# Patient Record
Sex: FEMALE | Race: ASIAN | NOT HISPANIC OR LATINO | Employment: OTHER | URBAN - METROPOLITAN AREA
[De-identification: names, ages, dates, MRNs, and addresses within clinical notes are randomized per-mention and may not be internally consistent; named-entity substitution may affect disease eponyms.]

---

## 2022-11-04 ENCOUNTER — APPOINTMENT (OUTPATIENT)
Dept: MRI IMAGING | Facility: CLINIC | Age: 82
End: 2022-11-04
Attending: EMERGENCY MEDICINE
Payer: COMMERCIAL

## 2022-11-04 ENCOUNTER — HOSPITAL ENCOUNTER (EMERGENCY)
Facility: CLINIC | Age: 82
Discharge: HOME | End: 2022-11-04
Attending: EMERGENCY MEDICINE | Admitting: EMERGENCY MEDICINE
Payer: COMMERCIAL

## 2022-11-04 VITALS
DIASTOLIC BLOOD PRESSURE: 84 MMHG | RESPIRATION RATE: 12 BRPM | OXYGEN SATURATION: 92 % | HEART RATE: 101 BPM | TEMPERATURE: 98 F | SYSTOLIC BLOOD PRESSURE: 189 MMHG

## 2022-11-04 DIAGNOSIS — R42 DIZZINESS: ICD-10-CM

## 2022-11-04 DIAGNOSIS — E11.319 DIABETIC RETINOPATHY OF BOTH EYES ASSOCIATED WITH TYPE 2 DIABETES MELLITUS, MACULAR EDEMA PRESENCE UNSPECIFIED, UNSPECIFIED RETINOPATHY SEVERITY (H): ICD-10-CM

## 2022-11-04 DIAGNOSIS — R79.89 ELEVATED TROPONIN: ICD-10-CM

## 2022-11-04 LAB
ANION GAP SERPL CALCULATED.3IONS-SCNC: 8 MMOL/L (ref 3–14)
ATRIAL RATE - MUSE: 77 BPM
BASOPHILS # BLD AUTO: 0.1 10E3/UL (ref 0–0.2)
BASOPHILS NFR BLD AUTO: 1 %
BUN SERPL-MCNC: 32 MG/DL (ref 7–30)
CALCIUM SERPL-MCNC: 9.2 MG/DL (ref 8.5–10.1)
CHLORIDE BLD-SCNC: 102 MMOL/L (ref 94–109)
CO2 SERPL-SCNC: 25 MMOL/L (ref 20–32)
CREAT SERPL-MCNC: 1.61 MG/DL (ref 0.52–1.04)
DIASTOLIC BLOOD PRESSURE - MUSE: NORMAL MMHG
EOSINOPHIL # BLD AUTO: 0.2 10E3/UL (ref 0–0.7)
EOSINOPHIL NFR BLD AUTO: 2 %
ERYTHROCYTE [DISTWIDTH] IN BLOOD BY AUTOMATED COUNT: 14.9 % (ref 10–15)
GFR SERPL CREATININE-BSD FRML MDRD: 32 ML/MIN/1.73M2
GLUCOSE BLD-MCNC: 274 MG/DL (ref 70–99)
HCT VFR BLD AUTO: 30.4 % (ref 35–47)
HGB BLD-MCNC: 9.4 G/DL (ref 11.7–15.7)
IMM GRANULOCYTES # BLD: 0 10E3/UL
IMM GRANULOCYTES NFR BLD: 0 %
INTERPRETATION ECG - MUSE: NORMAL
LYMPHOCYTES # BLD AUTO: 1.6 10E3/UL (ref 0.8–5.3)
LYMPHOCYTES NFR BLD AUTO: 20 %
MCH RBC QN AUTO: 20 PG (ref 26.5–33)
MCHC RBC AUTO-ENTMCNC: 30.9 G/DL (ref 31.5–36.5)
MCV RBC AUTO: 65 FL (ref 78–100)
MONOCYTES # BLD AUTO: 0.6 10E3/UL (ref 0–1.3)
MONOCYTES NFR BLD AUTO: 7 %
NEUTROPHILS # BLD AUTO: 5.5 10E3/UL (ref 1.6–8.3)
NEUTROPHILS NFR BLD AUTO: 70 %
NRBC # BLD AUTO: 0 10E3/UL
NRBC BLD AUTO-RTO: 0 /100
P AXIS - MUSE: 70 DEGREES
PLATELET # BLD AUTO: 209 10E3/UL (ref 150–450)
POTASSIUM BLD-SCNC: 3.8 MMOL/L (ref 3.4–5.3)
PR INTERVAL - MUSE: 144 MS
QRS DURATION - MUSE: 80 MS
QT - MUSE: 352 MS
QTC - MUSE: 398 MS
R AXIS - MUSE: -61 DEGREES
RBC # BLD AUTO: 4.69 10E6/UL (ref 3.8–5.2)
SODIUM SERPL-SCNC: 135 MMOL/L (ref 133–144)
SYSTOLIC BLOOD PRESSURE - MUSE: NORMAL MMHG
T AXIS - MUSE: 34 DEGREES
TROPONIN I SERPL HS-MCNC: 124 NG/L
TROPONIN I SERPL HS-MCNC: 126 NG/L
VENTRICULAR RATE- MUSE: 77 BPM
WBC # BLD AUTO: 7.9 10E3/UL (ref 4–11)

## 2022-11-04 PROCEDURE — 99285 EMERGENCY DEPT VISIT HI MDM: CPT | Mod: 25

## 2022-11-04 PROCEDURE — 36415 COLL VENOUS BLD VENIPUNCTURE: CPT | Performed by: EMERGENCY MEDICINE

## 2022-11-04 PROCEDURE — 70549 MR ANGIOGRAPH NECK W/O&W/DYE: CPT

## 2022-11-04 PROCEDURE — A9585 GADOBUTROL INJECTION: HCPCS | Performed by: EMERGENCY MEDICINE

## 2022-11-04 PROCEDURE — 96360 HYDRATION IV INFUSION INIT: CPT

## 2022-11-04 PROCEDURE — 84484 ASSAY OF TROPONIN QUANT: CPT | Performed by: EMERGENCY MEDICINE

## 2022-11-04 PROCEDURE — 36415 COLL VENOUS BLD VENIPUNCTURE: CPT | Performed by: INTERNAL MEDICINE

## 2022-11-04 PROCEDURE — 84484 ASSAY OF TROPONIN QUANT: CPT | Performed by: INTERNAL MEDICINE

## 2022-11-04 PROCEDURE — 80048 BASIC METABOLIC PNL TOTAL CA: CPT | Performed by: EMERGENCY MEDICINE

## 2022-11-04 PROCEDURE — 70553 MRI BRAIN STEM W/O & W/DYE: CPT

## 2022-11-04 PROCEDURE — 70544 MR ANGIOGRAPHY HEAD W/O DYE: CPT

## 2022-11-04 PROCEDURE — 96361 HYDRATE IV INFUSION ADD-ON: CPT

## 2022-11-04 PROCEDURE — 93005 ELECTROCARDIOGRAM TRACING: CPT

## 2022-11-04 PROCEDURE — 85014 HEMATOCRIT: CPT | Performed by: EMERGENCY MEDICINE

## 2022-11-04 PROCEDURE — 255N000002 HC RX 255 OP 636: Performed by: EMERGENCY MEDICINE

## 2022-11-04 PROCEDURE — 258N000003 HC RX IP 258 OP 636: Performed by: EMERGENCY MEDICINE

## 2022-11-04 RX ORDER — GADOBUTROL 604.72 MG/ML
10 INJECTION INTRAVENOUS ONCE
Status: COMPLETED | OUTPATIENT
Start: 2022-11-04 | End: 2022-11-04

## 2022-11-04 RX ORDER — ASPIRIN 81 MG/1
324 TABLET, CHEWABLE ORAL ONCE
Status: DISCONTINUED | OUTPATIENT
Start: 2022-11-04 | End: 2022-11-04 | Stop reason: HOSPADM

## 2022-11-04 RX ADMIN — GADOBUTROL 10 ML: 604.72 INJECTION INTRAVENOUS at 16:53

## 2022-11-04 RX ADMIN — SODIUM CHLORIDE 1000 ML: 9 INJECTION, SOLUTION INTRAVENOUS at 15:07

## 2022-11-04 RX ADMIN — SODIUM CHLORIDE 1000 ML: 9 INJECTION, SOLUTION INTRAVENOUS at 17:31

## 2022-11-04 ASSESSMENT — ENCOUNTER SYMPTOMS
FEVER: 0
DIZZINESS: 1
CHILLS: 0

## 2022-11-04 ASSESSMENT — ACTIVITIES OF DAILY LIVING (ADL)
ADLS_ACUITY_SCORE: 35

## 2022-11-04 NOTE — ED PROVIDER NOTES
"  History   Chief Complaint:  Dizziness    The history is provided by the patient. A  was used (Syriac).      June MYLES Do is a 81 year old female with history of diabetes who presents with dizziness and blurry vision via EMS. The patient states that she was lying in bed at home when she experienced sudden onset of her symptoms that lasted around 30 minutes wherein she felt dizzy in an \"uncomfortable way\" and experienced bilateral vision loss as well as feelings of passing out, though no syncope occurred. She screamed to call out for help, tried and failed to get in contact with her kids, and then left her house and pressed her med alert button and sat outside until EMS arrived. The patient lives independently, and EMS states that they could not obtain a thorough history due to a language barrier. En route her blood sugars were in the high 300s, and O2 saturations have been in the high 90s. She states that she currently feels better. She had some chest pain earlier but denies it currently alongside chest heaviness, fevers, chills, or recent illnesses. She has not had this problem before. She denies any history of stroke and is not anticoagulated.    Review of Systems   Constitutional: Negative for chills and fever.   Eyes: Positive for visual disturbance (bilateral vision loss).   Cardiovascular: Positive for chest pain.        (-) chest heaviness   Neurological: Positive for dizziness. Negative for syncope.   All other systems reviewed and are negative.    Allergies:  The patient has no known allergies to medications.    Medications:  The patient has no known medications.     Past Medical History:     Diabetes    Past Surgical History:    The patient denies any prior surgical history.    Family History:    The patient denies any prior family history.    Social History:  The patient presents to the ED via EMS.  PCP: No primary care provider on file.     Physical Exam     Patient Vitals for the " past 24 hrs:   BP Temp Temp src Pulse Resp SpO2   11/04/22 1800 (!) 189/84 -- -- 101 -- 92 %   11/04/22 1550 (!) 148/66 -- -- 76 12 95 %   11/04/22 1540 -- -- -- 77 13 93 %   11/04/22 1530 (!) 156/73 -- -- 75 16 94 %   11/04/22 1520 (!) 149/70 -- -- 73 14 95 %   11/04/22 1500 (!) 146/68 -- -- 80 19 95 %   11/04/22 1445 (!) 159/70 -- -- 83 15 94 %   11/04/22 1435 (!) 174/76 -- -- 86 -- --   11/04/22 1415 (!) 163/76 -- -- 73 18 100 %   11/04/22 1408 (!) 166/75 -- -- 76 -- --   11/04/22 1331 -- -- -- 73 11 100 %   11/04/22 1315 -- -- -- 75 11 99 %   11/04/22 1304 (!) 185/80 98  F (36.7  C) Temporal 80 12 98 %       Physical Exam  GENERAL: well developed, pleasant  HEAD: atraumatic  EYES: pupils reactive, extraocular muscles intact, conjunctivae normal  ENT:  mucus membranes moist  NECK:  trachea midline, normal range of motion  RESPIRATORY: no tachypnea, breath sounds clear to auscultation   CVS: normal S1/S2, no murmurs, intact distal pulses  ABDOMEN: soft, nontender, nondistention  MUSCULOSKELETAL: no deformities  SKIN: warm and dry, no acute rashes or ulceration  NEURO: GCS 15, cranial nerves intact, alert and oriented x3. Normal finger-to-nose.   PSYCH:  Mood/affect normal    Emergency Department Course   ECG  ECG results from 11/04/22   EKG 12 lead     Value    Systolic Blood Pressure     Diastolic Blood Pressure     Ventricular Rate 77    Atrial Rate 77    SC Interval 144    QRS Duration 80        QTc 398    P Axis 70    R AXIS -61    T Axis 34    Interpretation ECG      Sinus rhythm  Left axis deviation  Nonspecific ST and T wave abnormality  Abnormal ECG  No previous ECGs available  Confirmed by GENERATED REPORT, COMPUTER ([id number]),  Carla Arroyo ([id number]) on 11/4/2022 4:04:58 PM         Imaging:  MR Head w/o Contrast Angiogram   Final Result   IMPRESSION:   HEAD MRI:    1.  No acute intracranial process.   2.  Generalized brain atrophy and presumed microvascular ischemic changes as detailed above.    3.  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      HEAD MRA:    1.  Moderate stenosis of the distal M1 right middle cerebral artery.   2.  No additional high-grade stenosis or occlusion. No aneurysm or vascular malformation.      NECK MRA:   1.  Normal neck MRA.      MRA Angiogram Neck w/o & w Contrast   Final Result   IMPRESSION:   HEAD MRI:    1.  No acute intracranial process.   2.  Generalized brain atrophy and presumed microvascular ischemic changes as detailed above.   3.  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      HEAD MRA:    1.  Moderate stenosis of the distal M1 right middle cerebral artery.   2.  No additional high-grade stenosis or occlusion. No aneurysm or vascular malformation.      NECK MRA:   1.  Normal neck MRA.      MR Brain w/o & w Contrast   Final Result   IMPRESSION:   HEAD MRI:    1.  No acute intracranial process.   2.  Generalized brain atrophy and presumed microvascular ischemic changes as detailed above.   3.  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      HEAD MRA:    1.  Moderate stenosis of the distal M1 right middle cerebral artery.   2.  No additional high-grade stenosis or occlusion. No aneurysm or vascular malformation.      NECK MRA:   1.  Normal neck MRA.      NM Lexiscan stress test (nuc card)    (Results Pending)     Report per radiology    Laboratory:  Labs Ordered and Resulted from Time of ED Arrival to Time of ED Departure   BASIC METABOLIC PANEL - Abnormal       Result Value    Sodium 135      Potassium 3.8      Chloride 102      Carbon Dioxide (CO2) 25      Anion Gap 8      Urea Nitrogen 32 (*)     Creatinine 1.61 (*)     Calcium 9.2      Glucose 274 (*)     GFR Estimate 32 (*)    TROPONIN I - Abnormal    Troponin I High Sensitivity 124 (*)    CBC WITH PLATELETS AND DIFFERENTIAL - Abnormal    WBC Count 7.9      RBC Count 4.69      Hemoglobin 9.4 (*)     Hematocrit 30.4 (*)     MCV 65 (*)     MCH  20.0 (*)     MCHC 30.9 (*)     RDW 14.9      Platelet Count 209      % Neutrophils 70      % Lymphocytes 20      % Monocytes 7      % Eosinophils 2      % Basophils 1      % Immature Granulocytes 0      NRBCs per 100 WBC 0      Absolute Neutrophils 5.5      Absolute Lymphocytes 1.6      Absolute Monocytes 0.6      Absolute Eosinophils 0.2      Absolute Basophils 0.1      Absolute Immature Granulocytes 0.0      Absolute NRBCs 0.0     TROPONIN I - Abnormal    Troponin I High Sensitivity 126 (*)           Emergency Department Course:     Reviewed:  I reviewed nursing notes, vitals, past medical history and Care Everywhere    Assessments:  1333 I obtained history and examined the patient as noted above.    I rechecked the patient and explained findings.       Consults:  1358    I consulted with stroke neurology, regarding the patient's history and presentation here in the emergency department.    Interventions:  1507 NS 1L IV    Disposition:  Care of the patient was transferred to my colleague Dr. Hurley pending repeat troponin.     Impression & Plan     Medical Decision Making:  Patient presents with visual complaints, loss of vision, dizziness.  History is difficult even with phone interpretor.  MRI, MRA is negative.  Troponin is slightly elevated although patient doesn't recall chest pain or pressure and didn't recall it at beginning of case either, but there was possible reports to EMS of chest pain, although there wasn't an interpretor at the time.  She does not diabetic retinopathy and hyperglycemia.  Her vision is normal now.  I spoke with stroke neurology and bilateral blindness seems highly unlikely.  Discussed admission with hospitalist but notes chronic renal insuffiencey, and given lack of chest pain complaints, will repeat troponin and if unchanged discharge home if stable with gait.     Diagnosis:    ICD-10-CM    1. Dizziness  R42 NM Lexiscan stress test (nuc card)     Follow-Up with Cardiology MICHELE      2.  Elevated troponin  R77.8 NM Lexiscan stress test (nuc card)     Follow-Up with Cardiology MICHELE      3. Diabetic retinopathy of both eyes associated with type 2 diabetes mellitus, macular edema presence unspecified, unspecified retinopathy severity (H)  E11.319           Discharge Medications:  There are no discharge medications for this patient.      Scribe Disclosure:  Arabella SOUSA Hired, am serving as a scribe at 1:19 PM on 11/4/2022 to document services personally performed by Kian Smallwood MD based on my observations and the provider's statements to me.        Kian Smallwood MD  11/04/22 2120

## 2022-11-04 NOTE — CONSULTS
"      [facility]    Stroke Telephone Note    I was called by Kian Smallwood on 11/04/22 regarding patient June MYLES Do. The patient is a 81 year old female w/ PMHX of DM who presented with dizziness and bilateral vision loss. The dizzy was uncomfortable. No vision loss, no   PMHx of macular degeneration    Duration 30min.    Imaging Findings   MRI/MRA pending    Impression  Transient bilateral vision loss- had an episod eof bilateral vision loss lasting 30 min, associated with dizziness. No other deficits, back to baseline. Given bilateral complete vision loss this is not due to a TIA as a singular lesion would not produce bilateral complete vision loss. Would recommend further cardiac evaluation.     Recommendations     MRI hilary WWO/ MRA head and neck  If no stroke on MRI/MRA then no further stroke work-up needed    My recommendations are based on the information provided over the phone by June MYLES Do's in-person providers. They are not intended to replace the clinical judgment of her in-person providers. I was not requested to personally see or examine the patient at this time.    The Stroke Staff is Dr. Lozano.    Estrella Hi MD  Vascular Neurology Fellow  To page me or covering stroke neurology team member, click here: [facility]   Choose \"On Call\" tab at top, then search dropdown box for \"Neurology Adult\", select location, press Enter, then look for stroke/neuro ICU/telestroke.        "

## 2022-11-04 NOTE — ED PROVIDER NOTES
ED course:  Patient received as a handoff from my partner Dr. Smallwood.  On face-to-face evaluation patient reports no additional complaints.  She ambulated to the bathroom without assistance and denies current dizziness.  Repeat troponin remains flat.  Patient without chest pain or other concerning symptoms.  She will follow-up closely with ophthalmologist as scheduled this next week.  Return precautions discussed.  Discharged home with family.  Please see Dr. Brown's notes for additional details    Impression:    ICD-10-CM    1. Dizziness  R42       2. Elevated troponin  R77.8       3. Diabetic retinopathy of both eyes associated with type 2 diabetes mellitus, macular edema presence unspecified, unspecified retinopathy severity (H)  E11.319             Disposition:  Discharge         Ambrose Mcleodsundardiogo Richard,   11/04/22 5655

## 2022-11-04 NOTE — ED TRIAGE NOTES
Pt lives indep. - pt arrives via EMS after pt pushed medical alert button - unsure of chief complaint because of language barrier - pt reports feeling dizzy vision diff. -  - slight chest pain denies being SOB

## 2022-11-15 ENCOUNTER — DOCUMENTATION ONLY (OUTPATIENT)
Dept: OTHER | Facility: CLINIC | Age: 82
End: 2022-11-15

## 2023-02-24 ENCOUNTER — APPOINTMENT (OUTPATIENT)
Dept: GENERAL RADIOLOGY | Facility: CLINIC | Age: 83
End: 2023-02-24
Attending: EMERGENCY MEDICINE
Payer: COMMERCIAL

## 2023-02-24 ENCOUNTER — HOSPITAL ENCOUNTER (OUTPATIENT)
Facility: CLINIC | Age: 83
Setting detail: OBSERVATION
Discharge: HOME | End: 2023-02-26
Attending: EMERGENCY MEDICINE | Admitting: STUDENT IN AN ORGANIZED HEALTH CARE EDUCATION/TRAINING PROGRAM
Payer: COMMERCIAL

## 2023-02-24 DIAGNOSIS — E10.65 TYPE 1 DIABETES MELLITUS WITH HYPERGLYCEMIA (H): ICD-10-CM

## 2023-02-24 DIAGNOSIS — D64.9 ANEMIA, UNSPECIFIED TYPE: ICD-10-CM

## 2023-02-24 DIAGNOSIS — N18.32 STAGE 3B CHRONIC KIDNEY DISEASE (H): ICD-10-CM

## 2023-02-24 DIAGNOSIS — E87.1 HYPONATREMIA: Primary | ICD-10-CM

## 2023-02-24 LAB
ALBUMIN UR-MCNC: NEGATIVE MG/DL
ANION GAP SERPL CALCULATED.3IONS-SCNC: 13 MMOL/L (ref 7–15)
APPEARANCE UR: CLEAR
ATRIAL RATE - MUSE: 67 BPM
B-OH-BUTYR SERPL-MCNC: <0.2 MMOL/L
BASO STIPL BLD QL SMEAR: PRESENT
BASOPHILS # BLD AUTO: 0.1 10E3/UL (ref 0–0.2)
BASOPHILS NFR BLD AUTO: 1 %
BILIRUB UR QL STRIP: NEGATIVE
BUN SERPL-MCNC: 38.8 MG/DL (ref 8–23)
CALCIUM SERPL-MCNC: 9.3 MG/DL (ref 8.8–10.2)
CHLORIDE SERPL-SCNC: 90 MMOL/L (ref 98–107)
COLOR UR AUTO: ABNORMAL
CREAT SERPL-MCNC: 1.67 MG/DL (ref 0.51–0.95)
D DIMER PPP FEU-MCNC: 0.81 UG/ML FEU (ref 0–0.5)
DEPRECATED HCO3 PLAS-SCNC: 23 MMOL/L (ref 22–29)
DIASTOLIC BLOOD PRESSURE - MUSE: NORMAL MMHG
ELLIPTOCYTES BLD QL SMEAR: SLIGHT
EOSINOPHIL # BLD AUTO: 0.1 10E3/UL (ref 0–0.7)
EOSINOPHIL NFR BLD AUTO: 2 %
ERYTHROCYTE [DISTWIDTH] IN BLOOD BY AUTOMATED COUNT: 15.5 % (ref 10–15)
FLUAV RNA SPEC QL NAA+PROBE: NEGATIVE
FLUBV RNA RESP QL NAA+PROBE: NEGATIVE
GFR SERPL CREATININE-BSD FRML MDRD: 30 ML/MIN/1.73M2
GLUCOSE BLDC GLUCOMTR-MCNC: 209 MG/DL (ref 70–99)
GLUCOSE BLDC GLUCOMTR-MCNC: 273 MG/DL (ref 70–99)
GLUCOSE BLDC GLUCOMTR-MCNC: 311 MG/DL (ref 70–99)
GLUCOSE BLDC GLUCOMTR-MCNC: 413 MG/DL (ref 70–99)
GLUCOSE BLDC GLUCOMTR-MCNC: 441 MG/DL (ref 70–99)
GLUCOSE SERPL-MCNC: 592 MG/DL (ref 70–99)
GLUCOSE UR STRIP-MCNC: >=1000 MG/DL
HCO3 BLDV-SCNC: 22 MMOL/L (ref 21–28)
HCO3 BLDV-SCNC: 23 MMOL/L (ref 21–28)
HCT VFR BLD AUTO: 29.4 % (ref 35–47)
HGB BLD-MCNC: 9.1 G/DL (ref 11.7–15.7)
HGB UR QL STRIP: NEGATIVE
IMM GRANULOCYTES # BLD: 0 10E3/UL
IMM GRANULOCYTES NFR BLD: 0 %
INTERPRETATION ECG - MUSE: NORMAL
KETONES UR STRIP-MCNC: NEGATIVE MG/DL
LACTATE BLD-SCNC: 1.8 MMOL/L
LACTATE BLD-SCNC: 2.2 MMOL/L
LACTATE SERPL-SCNC: 1.9 MMOL/L (ref 0.7–2)
LEUKOCYTE ESTERASE UR QL STRIP: NEGATIVE
LYMPHOCYTES # BLD AUTO: 1.4 10E3/UL (ref 0.8–5.3)
LYMPHOCYTES NFR BLD AUTO: 24 %
MCH RBC QN AUTO: 19.6 PG (ref 26.5–33)
MCHC RBC AUTO-ENTMCNC: 31 G/DL (ref 31.5–36.5)
MCV RBC AUTO: 63 FL (ref 78–100)
MONOCYTES # BLD AUTO: 0.5 10E3/UL (ref 0–1.3)
MONOCYTES NFR BLD AUTO: 8 %
NEUTROPHILS # BLD AUTO: 3.6 10E3/UL (ref 1.6–8.3)
NEUTROPHILS NFR BLD AUTO: 65 %
NITRATE UR QL: NEGATIVE
NRBC # BLD AUTO: 0 10E3/UL
NRBC BLD AUTO-RTO: 0 /100
P AXIS - MUSE: 68 DEGREES
PCO2 BLDV: 38 MM HG (ref 40–50)
PCO2 BLDV: 40 MM HG (ref 40–50)
PH BLDV: 7.36 [PH] (ref 7.32–7.43)
PH BLDV: 7.38 [PH] (ref 7.32–7.43)
PH UR STRIP: 6 [PH] (ref 5–7)
PLAT MORPH BLD: ABNORMAL
PLATELET # BLD AUTO: 217 10E3/UL (ref 150–450)
PO2 BLDV: 48 MM HG (ref 25–47)
PO2 BLDV: 60 MM HG (ref 25–47)
POTASSIUM SERPL-SCNC: 4 MMOL/L (ref 3.4–5.3)
PR INTERVAL - MUSE: 140 MS
QRS DURATION - MUSE: 86 MS
QT - MUSE: 398 MS
QTC - MUSE: 420 MS
R AXIS - MUSE: -57 DEGREES
RBC # BLD AUTO: 4.65 10E6/UL (ref 3.8–5.2)
RBC MORPH BLD: ABNORMAL
RSV RNA SPEC NAA+PROBE: NEGATIVE
SAO2 % BLDV: 83 % (ref 94–100)
SAO2 % BLDV: 90 % (ref 94–100)
SARS-COV-2 RNA RESP QL NAA+PROBE: NEGATIVE
SODIUM SERPL-SCNC: 126 MMOL/L (ref 136–145)
SP GR UR STRIP: 1.01 (ref 1–1.03)
SYSTOLIC BLOOD PRESSURE - MUSE: NORMAL MMHG
T AXIS - MUSE: 103 DEGREES
TARGETS BLD QL SMEAR: SLIGHT
TROPONIN T SERPL HS-MCNC: 25 NG/L
TROPONIN T SERPL HS-MCNC: 31 NG/L
TROPONIN T SERPL HS-MCNC: 31 NG/L
UROBILINOGEN UR STRIP-MCNC: NORMAL MG/DL
VENTRICULAR RATE- MUSE: 67 BPM
WBC # BLD AUTO: 5.6 10E3/UL (ref 4–11)

## 2023-02-24 PROCEDURE — 84484 ASSAY OF TROPONIN QUANT: CPT | Performed by: STUDENT IN AN ORGANIZED HEALTH CARE EDUCATION/TRAINING PROGRAM

## 2023-02-24 PROCEDURE — 82010 KETONE BODYS QUAN: CPT | Performed by: EMERGENCY MEDICINE

## 2023-02-24 PROCEDURE — 36415 COLL VENOUS BLD VENIPUNCTURE: CPT

## 2023-02-24 PROCEDURE — G0378 HOSPITAL OBSERVATION PER HR: HCPCS

## 2023-02-24 PROCEDURE — 99285 EMERGENCY DEPT VISIT HI MDM: CPT | Mod: 25,CS

## 2023-02-24 PROCEDURE — 96372 THER/PROPH/DIAG INJ SC/IM: CPT | Performed by: STUDENT IN AN ORGANIZED HEALTH CARE EDUCATION/TRAINING PROGRAM

## 2023-02-24 PROCEDURE — 96361 HYDRATE IV INFUSION ADD-ON: CPT

## 2023-02-24 PROCEDURE — 84484 ASSAY OF TROPONIN QUANT: CPT | Performed by: EMERGENCY MEDICINE

## 2023-02-24 PROCEDURE — 36415 COLL VENOUS BLD VENIPUNCTURE: CPT | Performed by: EMERGENCY MEDICINE

## 2023-02-24 PROCEDURE — 258N000003 HC RX IP 258 OP 636: Performed by: EMERGENCY MEDICINE

## 2023-02-24 PROCEDURE — C9803 HOPD COVID-19 SPEC COLLECT: HCPCS

## 2023-02-24 PROCEDURE — 82962 GLUCOSE BLOOD TEST: CPT

## 2023-02-24 PROCEDURE — 250N000012 HC RX MED GY IP 250 OP 636 PS 637: Performed by: STUDENT IN AN ORGANIZED HEALTH CARE EDUCATION/TRAINING PROGRAM

## 2023-02-24 PROCEDURE — 99223 1ST HOSP IP/OBS HIGH 75: CPT | Mod: AI | Performed by: STUDENT IN AN ORGANIZED HEALTH CARE EDUCATION/TRAINING PROGRAM

## 2023-02-24 PROCEDURE — 83605 ASSAY OF LACTIC ACID: CPT

## 2023-02-24 PROCEDURE — 36415 COLL VENOUS BLD VENIPUNCTURE: CPT | Performed by: STUDENT IN AN ORGANIZED HEALTH CARE EDUCATION/TRAINING PROGRAM

## 2023-02-24 PROCEDURE — 93005 ELECTROCARDIOGRAM TRACING: CPT

## 2023-02-24 PROCEDURE — 87637 SARSCOV2&INF A&B&RSV AMP PRB: CPT | Performed by: EMERGENCY MEDICINE

## 2023-02-24 PROCEDURE — 82803 BLOOD GASES ANY COMBINATION: CPT

## 2023-02-24 PROCEDURE — 80048 BASIC METABOLIC PNL TOTAL CA: CPT | Performed by: EMERGENCY MEDICINE

## 2023-02-24 PROCEDURE — 85025 COMPLETE CBC W/AUTO DIFF WBC: CPT | Performed by: EMERGENCY MEDICINE

## 2023-02-24 PROCEDURE — 81003 URINALYSIS AUTO W/O SCOPE: CPT | Performed by: STUDENT IN AN ORGANIZED HEALTH CARE EDUCATION/TRAINING PROGRAM

## 2023-02-24 PROCEDURE — 85379 FIBRIN DEGRADATION QUANT: CPT | Performed by: EMERGENCY MEDICINE

## 2023-02-24 PROCEDURE — 96360 HYDRATION IV INFUSION INIT: CPT

## 2023-02-24 PROCEDURE — 71046 X-RAY EXAM CHEST 2 VIEWS: CPT

## 2023-02-24 RX ORDER — LOVASTATIN 20 MG
20 TABLET ORAL AT BEDTIME
Status: ON HOLD | COMMUNITY
End: 2023-08-14

## 2023-02-24 RX ORDER — DARBEPOETIN ALFA 60 UG/.3ML
60 INJECTION, SOLUTION INTRAVENOUS; SUBCUTANEOUS
COMMUNITY

## 2023-02-24 RX ORDER — CARVEDILOL 6.25 MG/1
6.25 TABLET ORAL 2 TIMES DAILY WITH MEALS
Status: ON HOLD | COMMUNITY
End: 2023-05-19

## 2023-02-24 RX ORDER — ONDANSETRON 2 MG/ML
4 INJECTION INTRAMUSCULAR; INTRAVENOUS EVERY 6 HOURS PRN
Status: DISCONTINUED | OUTPATIENT
Start: 2023-02-24 | End: 2023-02-26 | Stop reason: HOSPADM

## 2023-02-24 RX ORDER — LEVOCETIRIZINE DIHYDROCHLORIDE 5 MG/1
5 TABLET, FILM COATED ORAL EVERY EVENING
COMMUNITY

## 2023-02-24 RX ORDER — NICOTINE POLACRILEX 4 MG
15-30 LOZENGE BUCCAL
Status: DISCONTINUED | OUTPATIENT
Start: 2023-02-24 | End: 2023-02-26 | Stop reason: HOSPADM

## 2023-02-24 RX ORDER — LOSARTAN POTASSIUM 50 MG/1
50 TABLET ORAL DAILY
Status: ON HOLD | COMMUNITY
End: 2023-05-19

## 2023-02-24 RX ORDER — ONDANSETRON 4 MG/1
4 TABLET, ORALLY DISINTEGRATING ORAL EVERY 6 HOURS PRN
Status: DISCONTINUED | OUTPATIENT
Start: 2023-02-24 | End: 2023-02-26 | Stop reason: HOSPADM

## 2023-02-24 RX ORDER — GLIPIZIDE 10 MG/1
10 TABLET, FILM COATED, EXTENDED RELEASE ORAL 2 TIMES DAILY
Status: ON HOLD | COMMUNITY
End: 2023-05-14

## 2023-02-24 RX ORDER — BRIMONIDINE TARTRATE AND TIMOLOL MALEATE 2; 5 MG/ML; MG/ML
1 SOLUTION OPHTHALMIC 2 TIMES DAILY
COMMUNITY

## 2023-02-24 RX ORDER — ASPIRIN 81 MG/1
81 TABLET, CHEWABLE ORAL DAILY
COMMUNITY

## 2023-02-24 RX ORDER — CHOLECALCIFEROL (VITAMIN D3) 50 MCG
1 TABLET ORAL DAILY
COMMUNITY

## 2023-02-24 RX ORDER — DOCUSATE SODIUM 100 MG/1
100 CAPSULE, LIQUID FILLED ORAL DAILY
COMMUNITY

## 2023-02-24 RX ORDER — NIFEDIPINE 60 MG/1
60 TABLET, EXTENDED RELEASE ORAL DAILY
Status: ON HOLD | COMMUNITY
End: 2023-05-19

## 2023-02-24 RX ORDER — DEXTROSE MONOHYDRATE 25 G/50ML
25-50 INJECTION, SOLUTION INTRAVENOUS
Status: DISCONTINUED | OUTPATIENT
Start: 2023-02-24 | End: 2023-02-26 | Stop reason: HOSPADM

## 2023-02-24 RX ORDER — TRIAMTERENE/HYDROCHLOROTHIAZID 37.5-25 MG
1 TABLET ORAL DAILY
Status: ON HOLD | COMMUNITY
End: 2023-02-25

## 2023-02-24 RX ADMIN — INSULIN ASPART 4 UNITS: 100 INJECTION, SOLUTION INTRAVENOUS; SUBCUTANEOUS at 18:28

## 2023-02-24 RX ADMIN — SODIUM CHLORIDE 1000 ML: 9 INJECTION, SOLUTION INTRAVENOUS at 12:16

## 2023-02-24 ASSESSMENT — ENCOUNTER SYMPTOMS
DYSURIA: 0
WEAKNESS: 1
DIZZINESS: 1
FEVER: 0
VOMITING: 0
ABDOMINAL PAIN: 0
FATIGUE: 1
DIFFICULTY URINATING: 0
COUGH: 0
LIGHT-HEADEDNESS: 0
NAUSEA: 0
SHORTNESS OF BREATH: 1
NUMBNESS: 0

## 2023-02-24 ASSESSMENT — ACTIVITIES OF DAILY LIVING (ADL)
ADLS_ACUITY_SCORE: 35

## 2023-02-24 NOTE — ED PROVIDER NOTES
History     Chief Complaint:  Shortness of Breath       HPI   The history is provided by the patient. The history is limited by a language barrier. A  was used (Formal ).    June MYLES Do is a 82 year old female with a history of diabetes, hypertension and diabetic retinopathy who presents via EMS for weakness and shortness of breath this morning.  Patient reports she feels fatigued and short of breath since 9 AM.  Reports she was feeling well yesterday.  Patient lives with her daughter and her daughter called the ambulance this morning.  Her daughter is not with her because she had to go to work.  Patient does endorse a history of type 2 diabetes, but she does not recall what medication she takes.  She reports she takes insulin daily but she did not take it today.  She says she walks with a cane at baseline, but does not feel like ambulating today because she is too weak.  She denies chest pain, abdominal pain, urinary symptoms, nausea, vomiting, fever, chills, diarrhea.  She does states she feels like the room is spinning.  Denies any pain at this time.  Denies trauma or injury.    Independent Historian:   None - Patient Only and Daughter - They report this morning patient had shortness of breath. Daughter reports the last few days patient was fine. Daughter reports patient uses insulin pen and takes her medication in the morning and night. They report the last time she checked patient blood sugar was tuesday. They say patient's blood sugar is usually high. Daughter denies patient coughing and fever, abdominal pain, nausea, vomiting, falls or injuries. Daughter denies patient having urinary symptoms. She reports patient being confused and this has been happening for a while. Daughter believes patient may have dementia.     Review of External Notes: Reviewed the patient's note from her nephrology office visit 4 days ago. She is on Aranesp for chronic kidney disease anemia.  Her  hemoglobin at this time was 8.  I also reviewed the patient's emergency medicine visit from this department from November 2022.  She had dizziness, elevated troponin, diabetic retinopathy. She was instructed to follow-up with ophthalmology and discharged home with family.  She was living by herself at that time.    ROS:  Review of Systems   Constitutional: Positive for fatigue. Negative for fever.   Respiratory: Positive for shortness of breath. Negative for cough.    Cardiovascular: Negative for chest pain and leg swelling.   Gastrointestinal: Negative for abdominal pain, nausea and vomiting.   Genitourinary: Negative for difficulty urinating and dysuria.   Neurological: Positive for dizziness and weakness. Negative for light-headedness and numbness.       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   Arrived by EMS.    PCP: Cordelia Clark     Physical Exam     Patient Vitals for the past 24 hrs:   BP Temp Temp src Pulse Resp SpO2 Weight   02/24/23 1051 126/54 97.8  F (36.6  C) Oral 72 16 98 % --   02/24/23 1044 -- -- -- -- -- -- 54.4 kg (120 lb)        Physical Exam  Vital signs and nursing notes reviewed.    General:  Alert and oriented, no acute distress.   Skin: Skin is warm and dry. No rashes, lesions, or erythema. No diaphoresis.  HEENT:   Head: Normocephalic, atraumatic. Facial features symmetric.   Eyes: Conjunctiva pink, sclera white. EOMs grossly intact.   Ears: Auricles without lesion, erythema, or edema.   Nose: Symmetric with no discharge.  Mouth and throat: Lips are moist with no lesions or edema, Buccal and  oropharyngeal mucosa is pink and moist without lesions or exudate. Uvula is midline.  Neck: Normal range of motion. Neck supple with no lymphadenopathy. No tracheal deviation.   CV:  Heart RRR with no murmurs or extra heart sounds. 2+ radial and tibialis posterior pulses bilaterally. No peripheral edema.  Pulm/Chest: Chest wall expansion symmetric with no increased effort of breathing.  Expiratory rhonchi heard to auscultation to all fields examined posteriorly bilaterally.  Cleared with cough.  Abd: Bowel sounds present and physiologic. Abdomen is soft and nontender to palpation in all 4 quadrants with no guarding or rebound. No masses, hernias, or organomegaly.   M/S: Moves all extremities spontaneously.  Psych: Normal mood and affect. Behavior is normal.     Emergency Department Course   ECG  ECG taken at 1148, ECG read at 1158  Normal sinus rhythm   Left axis deviation  Nonspecific T wave abnormality   Abnormal ECG   No changes as compared to prior, dated 11/4/22.  Rate 66 bpm. MD interval 136 ms. QRS duration 90 ms. QT/QTc 402/421 ms. P-R-T axes 72 -58 88.     Imaging:  Chest XR,  PA & LAT   Final Result   IMPRESSION: There are no acute infiltrates. The cardiac silhouette is   not enlarged. Pulmonary vasculature is unremarkable.      RATAN PISANO MD            SYSTEM ID:  [id number]         Report per radiology    Laboratory:  Labs Ordered and Resulted from Time of ED Arrival to Time of ED Departure - No data to display     Procedures       Emergency Department Course & Assessments:           Interventions:  Medications - No data to display     Independent Interpretation (X-rays, CTs, rhythm strip):  I reviewed the patient's CXR    Consultations/Discussion of Management or Tests:  1144 Dr. Arthur spoke with daughter regarding the patient's history and presentation in the emergency department today.      ED Course as of 02/24/23 1733 Fri Feb 24, 2023   1224 Chest XR,  PA & LAT   1732 GLUCOSE BY METER POCT(!): 311        Social Determinants of Health affecting care:   Language barrier    Disposition:  The patient was admitted to the hospital under the care of Dr. Crain.     Impression & Plan    CMS Diagnoses: None      Medical Decision Making:  June MYLES Do is a 82 year old female With a history of type 2 diabetes with diabetic retinopathy and chronic kidney disease who presented to the emergency department for evaluation of fatigue and shortness of breath.  See HPI.  Patient is afebrile and vital signs stable.  Exam is benign and patient is resting comfortably.  Her blood sugar originally was 586 but has come down with interventions in the ED.  DKA was considered, however there were no ketones present in her blood. Significant glucose in the urine.  Noted anemia on CBC which is chronic due to her stage III CKD.  BMP showed hyponatremia. Lactic originally 2.2 which has come down to 1.8.  Troponin elevated at 31 and then 25. However, EKG showed no signs of acute ischemic injury or infarct.  Dr. Arthur spoke to the hospitalist, Dr. Crain, and they decided to order a D-dimer.  This resulted as normal for the patient's age-adjusted limit at 0.81.  Chest x-ray showed no infiltrates, pneumothorax, pleural effusion, or other acute pathology.  Given the patient's uncontrolled blood glucose, hyponatremia, and anemia, plan will be to admit to the hospital for further evaluation and management including additional potential cardiac work-up and serial troponins.  Patient and daughter are agreeable to plan and had their questions answered.  I staffed this patient with Dr. Arthur who agrees with the above assessment and plan.      Critical Care time:  was 0 minutes for this patient excluding procedures.    Diagnosis:    ICD-10-CM    1. Hyponatremia  E87.1       2. Type 1 diabetes mellitus with hyperglycemia (H)  E10.65       3. Stage 3b chronic kidney disease (H)  N18.32       4. Anemia, unspecified type  D64.9            Discharge  Medications:  New Prescriptions    No medications on file        2/24/2023   Meera Rockwell PA-C on 2/24/2023 at 5:50 PM       Meera Rockwell PA-C  02/24/23 1814

## 2023-02-24 NOTE — LETTER
[facility] EXTENDED RECOVERY AND SHORT STAY  [address] [place] [address]  [phone]          February 25, 2023    RE:  June MYLES Do                                                                                                                                                       [id number]   [facility] [address]            To whom it may concern:    June MYLES Do is under my professional care at [facility], she was admitted on 2/24/2023, her anticipated discharge date is 2/25/2023.      Sincerely,         Steve Zapien MD

## 2023-02-24 NOTE — LETTER
February 24, 2023      To Whom It May Concern:      A family member of Gloria Calloway was seen in our Emergency Department today, 02/24/23. She was with the patient all day and night. Please excuse her from work/school.  Sincerely,        Cordelia Guzman RN

## 2023-02-24 NOTE — ED PROVIDER NOTES
Emergency Department Attending Supervision Note  2/24/2023  11:32 AM      I evaluated this patient in conjunction with Meera Rockwell PA-C      Briefly, the patient presented with concern of chest pain, dyspnea and fatigue.  History was obtained through the patient's daughter as the patient herself does not speak English and does suffer memory impairment.  The patient's daughter states in the last 24 hours her mother has developed episodic chest pain and dyspnea and also seems rundown.  She states she administers her mother's medications and denies any issues with compliance.  No recent changes in medications.  No recent illness noted otherwise.  No fall or injury.  No known fever.  No cough.  No concern about abnormal urination or bowel movements.  No fall or trauma.      Vital signs:  Temp: 97.8  F (36.6  C) Temp src: Oral BP: 126/54 Pulse: 74   Resp: 14 SpO2: 96 %       Weight: 54.4 kg (120 lb)  There is no height or weight on file to calculate BMI.  SKIN:  Warm, dry.  No jaundice.  No rash.  HEMATOLOGIC/IMMUNOLOGIC/LYMPHATIC:  No pallor.  No peripheral edema.  HENT:  Moist oral mucosa.  Normal phonation.  No stridor.  No oropharyngeal erythema/inflammation.  EYES:  Conjunctivae normal.  Anicteric.  CARDIOVASCULAR:  Regular rate and rhythm.  No murmur.  No rub.  RESPIRATORY:  No respiratory distress, breath sounds equal and normal.  GASTROINTESTINAL:  Soft, nontender abdomen with active bowel sounds.  No distention.  No palpable abdominal mass.  MUSCULOSKELETAL: Normal body habitus.  NEUROLOGIC:  Alert, conversant.  PSYCHIATRIC:  Normal mood.  Calm, cooperative.        Results: Reviewed    ED course:    My impression:  This patient presents with a difficult history as she does have her cognitive and memory impairment.  Hard to say exactly what the patient has been experiencing.  But per the daughter's report the patient has suffered dyspnea and chest discomfort in addition to overall fatigue.  Multiple test  performed with respect to cardiopulmonary concerns.  D-dimer is age-adjusted negative so this was not pursued further regarding pulmonary embolism.  Cardiac testing is reassuring.  Considered infectious disease such as pneumonia and/or urinary tract infection.  Lactic acid was slightly elevated but cleared after time.  The patient is clinically well-appearing without fever with normal vital signs so I doubt occult sepsis.  Repeat troponin was downtrending.  However unclear exactly if the patient is suffering symptoms secondary to coronary artery disease/angina.  She is slightly hyponatremic even despite correcting for hyperglycemia.  So the patient was admitted for observation for continued monitoring testing and treatment.      Diagnosis    ICD-10-CM    1. Hyponatremia  E87.1       2. Type 1 diabetes mellitus with hyperglycemia (H)  E10.65       3. Stage 3b chronic kidney disease (H)  N18.32       4. Anemia, unspecified type  D64.9           Scribe Disclosure:  REINA SOUSA PRINCESS, am serving as a scribe at 12:00 PM on 2/24/2023 to document services personally performed by Brendan Arthur MD based on my observations and the provider's statements to me.    Brendan Arthur MD Moe, James Thomas, MD  02/24/23 1827

## 2023-02-24 NOTE — ED NOTES
DATE:  2/24/2023   TIME OF RECEIPT FROM LAB:  1134  LAB TEST:  Glucose  LAB VALUE:  592  RESULTS GIVEN WITH READ-BACK TO (PROVIDER):  Data Unavailable  TIME LAB VALUE REPORTED TO PROVIDER:   1138

## 2023-02-24 NOTE — H&P
[facility]    History and Physical - Hospitalist Service       Date of Admission:  2/24/2023    Assessment & Plan      June MYLES Do is a 82 year old female with past medical history significant for insulin dependent diabetes, hypertension, hyperlipidemia, and probable dementia admitted on 2/24/2023 with weakness, chest pain and shortness of breath.     Chest pain, shortness of breath   Mild Troponin elevation   Pt was reportedly complaining of chest pain and shortness of breath to her daughter earlier this morning. History is somewhat limited due to dementia and the patient does not recall having these symptoms earlier. She denies any current symptoms. .   Troponin is mildly elevated to 31, but trended down to 25 on repeat. EKG shows normal sinus rhythm without ST segment elevation. CXR is unremarkable.   D dimer is 0.81 (age adjusted normal).   - Cardiac monitoring  - Trend troponin   - Will get TTE in AM     Type II DM   Severe hyperglycemia without evidence of DKA  Blood sugars on admission was 592. No evidence of DKA   PTA regimen includes glargine 15 units qAM as well as Januvia, metformin and glipizide.   - Continue PTA glargine, may need dose increase   - MDSSI     Pseudo hyponatremia   Sodium 126 in the setting of severe hyperglycemia  - Monitor     Mild lactate elevation   Lactate elevated to 2.2. improved to 1.8.     Stage III CKD  Creatinine is 1.67. This appears to be near her baseline.   - Monitor renal function, avoid nephrotoxins     Anemia of chronic disease   Hemoglobin is 9.1. Stable from baseline. Gets regular aranesp injections   - Monitor     Essential Hypertension   PTA medications include carvedilol 6.25mg BID, losartan 50mg daily, nifedipine 60mg daily, Maxide 37.5-25mg daily.   - Continue PTA medications     Dementia  Pt lives with her daughter. I don't believe she has a formal diagnosis of dementia, but her daughter noted significantly worsened memory issues over the  past year or so. Daughter manages pts medications.   - Recommend outpatient eval for dementia  - Monitor for delirium      Diet: Moderate carb diet   DVT Prophylaxis: Pneumatic Compression Devices  Sotelo Catheter: Not present  Lines: None     Cardiac Monitoring: None  Code Status: Full Code     Clinically Significant Risk Factors Present on Admission         # Hyponatremia: Lowest Na = 126 mmol/L in last 2 days, will monitor as appropriate          # Hypertension: home medication list includes antihypertensive(s)              Disposition Plan         Sandra Crain MD  Hospitalist Service  [facility]  Securely message with [employer] (more info)  Text page via [facility] Paging/Directory     ______________________________________________________________________    Chief Complaint   Shortness of breath.     History is obtained from the patient and the patient's daughter.   Due to language barrier, an  was present during the history-taking and subsequent discussion (and for part of the physical exam) with this patient.      History of Present Illness   June MYLES Do is a 82 year old female who presented to the ED after complaining of chest pain and shortness of breath to her daughter earlier this morning. History is somewhat limited due to dementia and the patient does not recall having these symptoms earlier. She denies any current symptoms. She feels fine currently.     Past Medical History    Past Medical History:   Diagnosis Date     Diabetes (H)        Past Surgical History   History reviewed. No pertinent surgical history.    Prior to Admission Medications   Prior to Admission Medications   Prescriptions Last Dose Informant Patient Reported? Taking?   NIFEdipine ER OSMOTIC (PROCARDIA XL) 60 MG 24 hr tablet   Yes Yes   Sig: Take 60 mg by mouth daily   aspirin (ASA) 81 MG chewable tablet   Yes Yes   Sig: Take 81 mg by mouth daily   brimonidine-timolol (COMBIGAN) 0.2-0.5 % ophthalmic  solution   Yes Yes   Sig: Place 1 drop into both eyes 2 times daily   carvedilol (COREG) 6.25 MG tablet   Yes Yes   Sig: Take 6.25 mg by mouth 2 times daily (with meals)   darbepoetin claudette (ARANESP, ALBUMIN FREE,) 60 MCG/0.3ML injection   Yes Yes   Sig: Inject 60 mcg Subcutaneous every 14 days   docusate sodium (COLACE) 100 MG capsule   Yes Yes   Sig: Take 100 mg by mouth 2 times daily   glipiZIDE (GLUCOTROL XL) 10 MG 24 hr tablet   Yes Yes   Sig: Take 10 mg by mouth 2 times daily   insulin glargine (LANTUS PEN) 100 UNIT/ML pen   Yes Yes   Sig: Inject 15 Units Subcutaneous every morning   levocetirizine (XYZAL) 5 MG tablet   Yes Yes   Sig: Take 5 mg by mouth every evening   losartan (COZAAR) 50 MG tablet   Yes Yes   Sig: Take 50 mg by mouth daily   lovastatin (MEVACOR) 20 MG tablet   Yes Yes   Sig: Take 20 mg by mouth At Bedtime   metFORMIN (GLUCOPHAGE) 1000 MG tablet   Yes Yes   Sig: Take 1,000 mg by mouth 2 times daily (with meals)   sitagliptin (JANUVIA) 100 MG tablet   Yes Yes   Sig: Take 100 mg by mouth daily   triamterene-HCTZ (MAXZIDE-25) 37.5-25 MG tablet   Yes Yes   Sig: Take 1 tablet by mouth daily   vitamin D3 (CHOLECALCIFEROL) 50 mcg (2000 units) tablet   Yes Yes   Sig: Take 1 tablet by mouth daily      Facility-Administered Medications: None        Review of Systems    The 10 point Review of Systems is negative other than noted in the HPI or here.     Physical Exam   Vital Signs: Temp: 97.8  F (36.6  C) Temp src: Oral BP: 126/54 Pulse: 74   Resp: 14 SpO2: 96 %      Weight: 120 lbs 0 oz    Constitutional: Awake, alert, cooperative, no apparent distress.  Eyes: Conjunctiva and pupils examined and normal.  HEENT: Moist mucous membranes, normal dentition.  Respiratory: Clear to auscultation bilaterally, no crackles or wheezing.  Cardiovascular: Regular rate and rhythm, normal S1 and S2, and no murmur noted.  Skin: No rashes, no cyanosis, no edema.  Musculoskeletal: No joint swelling, erythema or  tenderness.  Neurologic: Cranial nerves 2-12 intact, normal strength and sensation.  Psychiatric: Alert, oriented to person, place and time, no obvious anxiety or depression.      Medical Decision Making       75 MINUTES SPENT BY ME on the date of service doing chart review, history, exam, documentation & further activities per the note.      Data     I have personally reviewed the following data over the past 24 hrs:    5.6  \   9.1 (L)   / 217     126 (L) 90 (L) 38.8 (H) /  311 (H)   4.0 23 1.67 (H) \       Trop: 25 (H) BNP: N/A       Procal: N/A CRP: N/A Lactic Acid: 1.9       INR:  N/A PTT:  N/A   D-dimer:  0.81 (H) Fibrinogen:  N/A       Imaging results reviewed over the past 24 hrs:   Recent Results (from the past 24 hour(s))   Chest XR,  PA & LAT    Narrative    CHEST TWO VIEWS 2/24/2023 11:49 AM     HISTORY: Dyspnea, chest pain    COMPARISON: None.       Impression    IMPRESSION: There are no acute infiltrates. The cardiac silhouette is  not enlarged. Pulmonary vasculature is unremarkable.    RATNA PISANO MD         SYSTEM ID:  [id number]

## 2023-02-24 NOTE — ED NOTES
Pt having chest pain, found holding chest at home and complaints of SOB. Overall feeling unwell.  at home, EMS gave 500NS now 526

## 2023-02-24 NOTE — ED NOTES
[facility]  ED Nurse Handoff Report    ED Chief complaint: Shortness of Breath      ED Diagnosis:   Final diagnoses:   Hyponatremia   Type 1 diabetes mellitus with hyperglycemia (H)   Stage 3b chronic kidney disease (H)   Anemia, unspecified type       Code Status: Full Code    Allergies: No Known Allergies    Patient Story: Pt daughter called EMS for evaluation of SOB.  Focused Assessment:  Pt c/o SOB to EMS, no CP. Pt appears comfortable at rest. Pt hx includes DM. BGM at home per EMS was 586. 1000CC NS given in ED and BGM has improved to 414. Pt in NSR, Sats 96% on RA.   Results for orders placed or performed during the hospital encounter of 02/24/23   Chest XR,  PA & LAT     Status: None    Narrative    CHEST TWO VIEWS 2/24/2023 11:49 AM     HISTORY: Dyspnea, chest pain    COMPARISON: None.       Impression    IMPRESSION: There are no acute infiltrates. The cardiac silhouette is  not enlarged. Pulmonary vasculature is unremarkable.    RATNA PISANO MD         SYSTEM ID:  [id number]   Basic metabolic panel     Status: Abnormal   Result Value Ref Range    Sodium 126 (L) 136 - 145 mmol/L    Potassium 4.0 3.4 - 5.3 mmol/L    Chloride 90 (L) 98 - 107 mmol/L    Carbon Dioxide (CO2) 23 22 - 29 mmol/L    Anion Gap 13 7 - 15 mmol/L    Urea Nitrogen 38.8 (H) 8.0 - 23.0 mg/dL    Creatinine 1.67 (H) 0.51 - 0.95 mg/dL    Calcium 9.3 8.8 - 10.2 mg/dL    Glucose 592 (HH) 70 - 99 mg/dL    GFR Estimate 30 (L) >60 mL/min/1.73m2   Troponin T, High Sensitivity     Status: Abnormal   Result Value Ref Range    Troponin T, High Sensitivity 31 (H) <=14 ng/L   CBC with platelets and differential     Status: Abnormal   Result Value Ref Range    WBC Count 5.6 4.0 - 11.0 10e3/uL    RBC Count 4.65 3.80 - 5.20 10e6/uL    Hemoglobin 9.1 (L) 11.7 - 15.7 g/dL    Hematocrit 29.4 (L) 35.0 - 47.0 %    MCV 63 (L) 78 - 100 fL    MCH 19.6 (L) 26.5 - 33.0 pg    MCHC 31.0 (L) 31.5 - 36.5 g/dL    RDW 15.5 (H) 10.0 - 15.0 %    Platelet  Count 217 150 - 450 10e3/uL    % Neutrophils 65 %    % Lymphocytes 24 %    % Monocytes 8 %    % Eosinophils 2 %    % Basophils 1 %    % Immature Granulocytes 0 %    NRBCs per 100 WBC 0 <1 /100    Absolute Neutrophils 3.6 1.6 - 8.3 10e3/uL    Absolute Lymphocytes 1.4 0.8 - 5.3 10e3/uL    Absolute Monocytes 0.5 0.0 - 1.3 10e3/uL    Absolute Eosinophils 0.1 0.0 - 0.7 10e3/uL    Absolute Basophils 0.1 0.0 - 0.2 10e3/uL    Absolute Immature Granulocytes 0.0 <=0.4 10e3/uL    Absolute NRBCs 0.0 10e3/uL   Ketone Beta-Hydroxybutyrate Quantitative     Status: Normal   Result Value Ref Range    Ketone (Beta-Hydroxybutyrate) Quantitative <0.20 <=0.30 mmol/L   Symptomatic Influenza A/B & SARS-CoV2 (COVID-19) Virus PCR Multiplex Nasopharyngeal     Status: Normal    Specimen: Nasopharyngeal; Swab   Result Value Ref Range    Influenza A PCR Negative Negative    Influenza B PCR Negative Negative    RSV PCR Negative Negative    SARS CoV2 PCR Negative Negative    Narrative    Testing was performed using the Xpert Xpress CoV2/Flu/RSV Assay on the Cepheid GeneXpert Instrument. This test should be ordered for the detection of SARS-CoV-2 and influenza viruses in individuals who meet clinical and/or epidemiological criteria. Test performance is unknown in asymptomatic patients. This test is for in vitro diagnostic use under the FDA EUA for laboratories certified under CLIA to perform high or moderate complexity testing. This test has not been FDA cleared or approved. A negative result does not rule out the presence of PCR inhibitors in the specimen or target RNA in concentration below the limit of detection for the assay. If only one viral target is positive but coinfection with multiple targets is suspected, the sample should be re-tested with another FDA cleared, approved, or authorized test, if coinfection would change clinical management. This test was validated by the [facility]. These laboratories are certified  under the Clinical Laboratory Improvement Amendments of 1988 (CLIA-88) as qualified to perform high complexity laboratory testing.   iStat Gases (lactate) venous, POCT     Status: Abnormal   Result Value Ref Range    Lactic Acid POCT 2.2 (H) <=2.0 mmol/L    Bicarbonate Venous POCT 23 21 - 28 mmol/L    O2 Sat, Venous POCT 83 (L) 94 - 100 %    pCO2V Venous POCT 40 40 - 50 mm Hg    pH Venous POCT 7.38 7.32 - 7.43    pO2 Venous POCT 48 (H) 25 - 47 mm Hg   Glucose by meter     Status: Abnormal   Result Value Ref Range    GLUCOSE BY METER POCT 441 (H) 70 - 99 mg/dL   RBC and Platelet Morphology     Status: Abnormal   Result Value Ref Range    Platelet Assessment (A) Automated Count Confirmed. Platelet morphology is normal.     Automated Count Confirmed. Giant platelets are present.    Basophilic Stippling Present (A) None Seen    Elliptocytes Slight (A) None Seen    Target Cells Slight (A) None Seen    RBC Morphology Confirmed RBC Indices    EKG 12 lead     Status: None   Result Value Ref Range    Systolic Blood Pressure  mmHg    Diastolic Blood Pressure  mmHg    Ventricular Rate 67 BPM    Atrial Rate 67 BPM    WY Interval 140 ms    QRS Duration 86 ms     ms    QTc 420 ms    P Axis 68 degrees    R AXIS -57 degrees    T Axis 103 degrees    Interpretation ECG       Sinus rhythm  Left axis deviation  Nonspecific T wave abnormality  Abnormal ECG  When compared with ECG of 04-NOV-2022 13:23,  No significant change was found  Confirmed by GENERATED REPORT, COMPUTER ([id number]),  Eleni Chavez ([id number]) on 2/24/2023 12:34:39 PM     CBC with platelets + differential     Status: Abnormal    Narrative    The following orders were created for panel order CBC with platelets + differential.  Procedure                               Abnormality         Status                     ---------                               -----------         ------                     CBC with platelets and d...[[id number]]  Abnormal            Final result                RBC and Platelet Morphology[[id number]]  Abnormal            Final result                 Please view results for these tests on the individual orders.         Treatments and/or interventions provided: IVF  Patient's response to treatments and/or interventions: bgm improved    To be done/followed up on inpatient unit:      Does this patient have any cognitive concerns?:  None    Activity level - Baseline/Home:  Stand with Assist  Activity Level - Current:   Cane    Patient's Preferred language: Urdu   Needed?: Yes    Isolation: None  Infection: Not Applicable  Patient tested for COVID 19 prior to admission: YES  Bariatric?: No    Vital Signs:   Vitals:    02/24/23 1044 02/24/23 1051 02/24/23 1300   BP:  126/54    Pulse:  72 68   Resp:  16 21   Temp:  97.8  F (36.6  C)    TempSrc:  Oral    SpO2:  98% 96%   Weight: 54.4 kg (120 lb)         Cardiac Rhythm:     Was the PSS-3 completed:   Yes  What interventions are required if any?               Family Comments: Daughter at bedside. Please use interpretor services  OBS brochure/video discussed/provided to patient/family: N/A              Name of person given brochure if not patient: N/A              Relationship to patient: N/A    For the majority of the shift this patient's behavior was Green.   Behavioral interventions performed were N/A.    ED NURSE PHONE NUMBER: [phone]

## 2023-02-25 LAB
ANION GAP SERPL CALCULATED.3IONS-SCNC: 10 MMOL/L (ref 7–15)
BUN SERPL-MCNC: 35.7 MG/DL (ref 8–23)
CALCIUM SERPL-MCNC: 9.4 MG/DL (ref 8.8–10.2)
CHLORIDE SERPL-SCNC: 100 MMOL/L (ref 98–107)
CREAT SERPL-MCNC: 1.56 MG/DL (ref 0.51–0.95)
DEPRECATED HCO3 PLAS-SCNC: 25 MMOL/L (ref 22–29)
ERYTHROCYTE [DISTWIDTH] IN BLOOD BY AUTOMATED COUNT: 15.8 % (ref 10–15)
GFR SERPL CREATININE-BSD FRML MDRD: 33 ML/MIN/1.73M2
GLUCOSE BLDC GLUCOMTR-MCNC: 240 MG/DL (ref 70–99)
GLUCOSE BLDC GLUCOMTR-MCNC: 291 MG/DL (ref 70–99)
GLUCOSE BLDC GLUCOMTR-MCNC: 344 MG/DL (ref 70–99)
GLUCOSE BLDC GLUCOMTR-MCNC: 419 MG/DL (ref 70–99)
GLUCOSE BLDC GLUCOMTR-MCNC: 427 MG/DL (ref 70–99)
GLUCOSE SERPL-MCNC: 359 MG/DL (ref 70–99)
HCT VFR BLD AUTO: 27.4 % (ref 35–47)
HGB BLD-MCNC: 8.6 G/DL (ref 11.7–15.7)
MCH RBC QN AUTO: 19.6 PG (ref 26.5–33)
MCHC RBC AUTO-ENTMCNC: 31.4 G/DL (ref 31.5–36.5)
MCV RBC AUTO: 62 FL (ref 78–100)
NT-PROBNP SERPL-MCNC: 251 PG/ML (ref 0–1800)
PLATELET # BLD AUTO: 239 10E3/UL (ref 150–450)
POTASSIUM SERPL-SCNC: 4.1 MMOL/L (ref 3.4–5.3)
RBC # BLD AUTO: 4.39 10E6/UL (ref 3.8–5.2)
SODIUM SERPL-SCNC: 135 MMOL/L (ref 136–145)
WBC # BLD AUTO: 6.3 10E3/UL (ref 4–11)

## 2023-02-25 PROCEDURE — 85014 HEMATOCRIT: CPT | Performed by: STUDENT IN AN ORGANIZED HEALTH CARE EDUCATION/TRAINING PROGRAM

## 2023-02-25 PROCEDURE — 250N000009 HC RX 250: Performed by: INTERNAL MEDICINE

## 2023-02-25 PROCEDURE — 250N000013 HC RX MED GY IP 250 OP 250 PS 637: Performed by: INTERNAL MEDICINE

## 2023-02-25 PROCEDURE — 36415 COLL VENOUS BLD VENIPUNCTURE: CPT | Performed by: STUDENT IN AN ORGANIZED HEALTH CARE EDUCATION/TRAINING PROGRAM

## 2023-02-25 PROCEDURE — 96372 THER/PROPH/DIAG INJ SC/IM: CPT | Performed by: STUDENT IN AN ORGANIZED HEALTH CARE EDUCATION/TRAINING PROGRAM

## 2023-02-25 PROCEDURE — 82962 GLUCOSE BLOOD TEST: CPT

## 2023-02-25 PROCEDURE — 80048 BASIC METABOLIC PNL TOTAL CA: CPT | Performed by: STUDENT IN AN ORGANIZED HEALTH CARE EDUCATION/TRAINING PROGRAM

## 2023-02-25 PROCEDURE — 99232 SBSQ HOSP IP/OBS MODERATE 35: CPT | Performed by: INTERNAL MEDICINE

## 2023-02-25 PROCEDURE — G0378 HOSPITAL OBSERVATION PER HR: HCPCS

## 2023-02-25 PROCEDURE — 250N000012 HC RX MED GY IP 250 OP 636 PS 637: Performed by: STUDENT IN AN ORGANIZED HEALTH CARE EDUCATION/TRAINING PROGRAM

## 2023-02-25 PROCEDURE — 83880 ASSAY OF NATRIURETIC PEPTIDE: CPT | Performed by: INTERNAL MEDICINE

## 2023-02-25 RX ORDER — ASPIRIN 81 MG/1
81 TABLET, CHEWABLE ORAL DAILY
Status: DISCONTINUED | OUTPATIENT
Start: 2023-02-25 | End: 2023-02-26 | Stop reason: HOSPADM

## 2023-02-25 RX ORDER — CARVEDILOL 6.25 MG/1
6.25 TABLET ORAL 2 TIMES DAILY WITH MEALS
Status: DISCONTINUED | OUTPATIENT
Start: 2023-02-25 | End: 2023-02-26 | Stop reason: HOSPADM

## 2023-02-25 RX ORDER — BRIMONIDINE TARTRATE AND TIMOLOL MALEATE 2; 5 MG/ML; MG/ML
1 SOLUTION OPHTHALMIC 2 TIMES DAILY
Status: DISCONTINUED | OUTPATIENT
Start: 2023-02-25 | End: 2023-02-26 | Stop reason: HOSPADM

## 2023-02-25 RX ORDER — LOSARTAN POTASSIUM 50 MG/1
50 TABLET ORAL DAILY
Status: DISCONTINUED | OUTPATIENT
Start: 2023-02-25 | End: 2023-02-26 | Stop reason: HOSPADM

## 2023-02-25 RX ADMIN — NIFEDIPINE 60 MG: 60 TABLET, FILM COATED, EXTENDED RELEASE ORAL at 09:43

## 2023-02-25 RX ADMIN — CARVEDILOL 6.25 MG: 6.25 TABLET, FILM COATED ORAL at 08:54

## 2023-02-25 RX ADMIN — LOSARTAN POTASSIUM 50 MG: 50 TABLET, FILM COATED ORAL at 08:54

## 2023-02-25 RX ADMIN — BRIMONIDINE TARTRATE, TIMOLOL MALEATE 1 DROP: 2; 5 SOLUTION/ DROPS OPHTHALMIC at 19:53

## 2023-02-25 RX ADMIN — INSULIN ASPART 4 UNITS: 100 INJECTION, SOLUTION INTRAVENOUS; SUBCUTANEOUS at 08:58

## 2023-02-25 RX ADMIN — CARVEDILOL 6.25 MG: 6.25 TABLET, FILM COATED ORAL at 18:18

## 2023-02-25 RX ADMIN — ASPIRIN 81 MG CHEWABLE TABLET 81 MG: 81 TABLET CHEWABLE at 08:54

## 2023-02-25 RX ADMIN — INSULIN GLARGINE 15 UNITS: 100 INJECTION, SOLUTION SUBCUTANEOUS at 08:58

## 2023-02-25 RX ADMIN — BRIMONIDINE TARTRATE, TIMOLOL MALEATE 1 DROP: 2; 5 SOLUTION/ DROPS OPHTHALMIC at 12:32

## 2023-02-25 ASSESSMENT — ACTIVITIES OF DAILY LIVING (ADL)
ADLS_ACUITY_SCORE: 38
ADLS_ACUITY_SCORE: 38
ADLS_ACUITY_SCORE: 41
ADLS_ACUITY_SCORE: 41
ADLS_ACUITY_SCORE: 38
ADLS_ACUITY_SCORE: 37
ADLS_ACUITY_SCORE: 36
ADLS_ACUITY_SCORE: 38
ADLS_ACUITY_SCORE: 41
ADLS_ACUITY_SCORE: 38
ADLS_ACUITY_SCORE: 38
ADLS_ACUITY_SCORE: 37

## 2023-02-25 NOTE — PROGRESS NOTES
Observation goals  PRIOR TO DISCHARGE       Comments:   -diagnostic tests and consults completed and resulted- Not met.  Needs an echo.   -vital signs normal or at patient baseline- Met at this time.   -returns to baseline functional status- Not met.  Still assessing.   -safe disposition plan has been identified- Not met.  Per report lives with her step-daughter.

## 2023-02-25 NOTE — PLAN OF CARE
Goal Outcome Evaluation:       [id number] Shift summary:      Goal Outcome Evaluation:  Orientation/Cognitive: A&O to self, Mohawk speaking (needs )Step-daughter at bedside interpreting for morning assessment.   Observation Goals (Met/ Not Met): Not met.  Mobility Level/Assist Equipment: Ax1-2 GB/Walker.   Fall Risk (Y/N): Yes.   Behavior Concerns: None  Pain Management: Denies pain.   Tele/VS/O2: VSS on RA, Tele-SR .  ABNL Lab/BG:  Na+ 135.  Blood sugar 334. HGB-8.6.  Diet: Mod CHO diet   Bowel/Bladder:Continent.   Skin Concerns: None.  Drains/Devices: PIV SL .  Tests/Procedures for next shift: ECHO.  Anticipated DC date & active delays: TBD .  Patient Stated Goal for Today: Rest.      Lives with her step-daughter.

## 2023-02-25 NOTE — PROGRESS NOTES
RECEIVING UNIT ED HANDOFF REVIEW    ED Nurse Handoff Report was reviewed by: Marcus Watson RN on February 24, 2023 at 10:17 PM

## 2023-02-25 NOTE — PROGRESS NOTES
Observation goals  PRIOR TO DISCHARGE       Comments:   -diagnostic tests and consults completed and resulted- Not met    -vital signs normal or at patient baseline- Met    -returns to baseline functional status- Not met   -safe disposition plan has been identified- Not met

## 2023-02-25 NOTE — PLAN OF CARE
Goal Outcome Evaluation:  Orientation/Cognitive: A&O to self, Slovak speaking (needs )  Observation Goals (Met/ Not Met): Not met   Mobility Level/Assist Equipment: Ax1-2 GB/Walker   Fall Risk (Y/N): Yes   Behavior Concerns: None   Pain Management: Denies pain   Tele/VS/O2: VSS on RA, Tele-SR   ABNL Lab/BG: Troponin- 31,25. BG- 209, 291  Diet: Mod CHO diet   Bowel/Bladder: Incontinent, Purewick in place (Only @night)  Skin Concerns: None   Drains/Devices: PIV SL   Tests/Procedures for next shift: ECHO  Anticipated DC date & active delays: TBD   Patient Stated Goal for Today: Rest       Observation goals  PRIOR TO DISCHARGE       Comments:   -diagnostic tests and consults completed and resulted- Not met    -vital signs normal or at patient baseline- Met    -returns to baseline functional status- Not met   -safe disposition plan has been identified- Not met

## 2023-02-25 NOTE — PHARMACY-ADMISSION MEDICATION HISTORY
Pharmacy Medication History  Admission medication history interview status for the 2/24/2023  admission is complete. See EPIC admission navigator for prior to admission medications     Location of Interview: Phone  Medication history sources: Patient's family/friend (daughter- Gloria), Surescripts and Care Everywhere    Significant changes made to the medication list:  None    In the past week, patient estimated taking medication this percent of the time: greater than 90%    Medication Affordability:  Not including over the counter (OTC) medications, was there a time in the past 12 months when you did not take your medications as prescribed because of cost?: No    Additional medication history information:   None    Medication reconciliation completed by provider prior to medication history? Yes    Time spent in this activity: 20 minutes    Prior to Admission medications    Medication Sig Last Dose Taking? Auth Provider Long Term End Date   aspirin (ASA) 81 MG chewable tablet Take 81 mg by mouth daily 2/24/2023 at AM Yes Unknown, Entered By History     brimonidine-timolol (COMBIGAN) 0.2-0.5 % ophthalmic solution Place 1 drop into both eyes 2 times daily 2/24/2023 at AM Yes Unknown, Entered By History     carvedilol (COREG) 6.25 MG tablet Take 6.25 mg by mouth 2 times daily (with meals) 2/24/2023 at AM Yes Unknown, Entered By History Yes    darbepoetin claudette (ARANESP, ALBUMIN FREE,) 60 MCG/0.3ML injection Inject 60 mcg Subcutaneous every 14 days Past Month Yes Unknown, Entered By History     docusate sodium (COLACE) 100 MG capsule Take 100 mg by mouth 2 times daily 2/24/2023 at AM Yes Unknown, Entered By History     glipiZIDE (GLUCOTROL XL) 10 MG 24 hr tablet Take 10 mg by mouth 2 times daily 2/24/2023 at AM Yes Unknown, Entered By History Yes    insulin glargine (LANTUS PEN) 100 UNIT/ML pen Inject 15 Units Subcutaneous every morning 2/24/2023 at AM Yes Unknown, Entered By History     levocetirizine (XYZAL) 5 MG tablet  Take 5 mg by mouth every evening 2/23/2023 at PM Yes Unknown, Entered By History     losartan (COZAAR) 50 MG tablet Take 50 mg by mouth daily 2/24/2023 at AM Yes Unknown, Entered By History Yes    lovastatin (MEVACOR) 20 MG tablet Take 20 mg by mouth At Bedtime 2/23/2023 at PM Yes Unknown, Entered By History Yes    metFORMIN (GLUCOPHAGE) 1000 MG tablet Take 1,000 mg by mouth 2 times daily (with meals) 2/24/2023 at AM Yes Unknown, Entered By History Yes    NIFEdipine ER OSMOTIC (PROCARDIA XL) 60 MG 24 hr tablet Take 60 mg by mouth daily 2/24/2023 at AM Yes Unknown, Entered By History Yes    sitagliptin (JANUVIA) 100 MG tablet Take 100 mg by mouth daily 2/24/2023 at AM Yes Unknown, Entered By History Yes    triamterene-HCTZ (MAXZIDE-25) 37.5-25 MG tablet Take 1 tablet by mouth daily 2/24/2023 at AM Yes Unknown, Entered By History Yes    vitamin D3 (CHOLECALCIFEROL) 50 mcg (2000 units) tablet Take 1 tablet by mouth daily 2/24/2023 at AM Yes Unknown, Entered By History         The information provided in this note is only as accurate as the sources available at the time of update(s)

## 2023-02-25 NOTE — PROVIDER NOTIFICATION
MD Notification    Notified Person: MD Dr. Zapien    Notified Person Name:    Notification Date/Time:2/25/2023    Notification Interaction:web    Purpose of Notification:pt requesting stool softener    Orders Received:    Comments:

## 2023-02-25 NOTE — PLAN OF CARE
Goal Outcome Evaluation:  2/25/23, 11 a to 3 p  Came in to SOB, chest pain     Orientation: Bhutanese speaking, can understand and speak a little english, calm, pleasant and cooperative    Vitals/Tele: VSS on RA, reported minimal chest pain, mid sternum    IV Access/drains: IV saline lock RUE    Diet: Mod carb    Mobility: A1 GB, cane here and  baseline    GI/: Ambulates to the BR per report    Wound/Skin: Intact, dry    Consults: Echo done today, hospitalist    Discharge Plan: TBD      See Flow sheets for assessment

## 2023-02-25 NOTE — PROGRESS NOTES
[facility]    Hospitalist  Progress Note       Date of Admission:  2/24/2023  Date of Discharge:  2/25/2023  Discharging Provider: Steve Zapien MD    Discharge Diagnoses   Chest pain and shortness of breath, resolved  Hyperglycemia due to Type 2 DM, uncontrolled  CKD3  Hypertension    Follow-ups Needed After Discharge   Follow-up Appointments     Follow-up and recommended labs and tests       Follow up with primary care provider, Cordelia Clark, within 7 days for   hospital follow- up.  No follow up labs or test are needed.           Hospital Course   June MYLES Do is a 82 year old female with past medical history significant for insulin dependent diabetes, hypertension, hyperlipidemia, and probable dementia admitted on 2/24/2023 with weakness, chest pain and shortness of breath. Pt was reportedly complaining of chest pain and shortness of breath to her daughter earlier this morning. History is somewhat limited due to dementia and the patient does not recall having these symptoms earlier. She denies any current symptoms. Troponin is mildly elevated to 31, but trended down to 25 on repeat. EKG shows normal sinus rhythm without ST segment elevation. CXR is unremarkable. D dimer is 0.81 (age adjusted normal).    Patient without complaints, however continues to feel dyspnea at rest. Echocardiogram was ordered but could not be done on time on the weekend. Due to patient's ongoing symptoms will monitor inpatient one more day  - Pending echo  - Repeat troponin in AM  - Check BNP    Type II DM   Hyperglycemia without evidence of DKA  Blood sugars on admission was 592. No evidence of DKA   PTA regimen includes glargine 15 units qAM as well as Januvia, metformin and glipizide. Daughter reports patient does not regularly receive her long acting insulin (given 2-3 weekly) due to somnolence.  - Encouraged daughter to give Glargine 15 units daily without skipping  - Outpatient insulin  titration    Stage III CKD  Creatinine is 1.67. This appears to be near her baseline.     Essential Hypertension   PTA medications include carvedilol 6.25mg BID, losartan 50mg daily, nifedipine 60mg daily, Maxide 37.5-25mg daily.   - Maxzide was stopped this admission due to somewhat soft blood pressures. Restart in outpatient as needed    Dementia  Pt lives with her daughter. I don't believe she has a formal diagnosis of dementia, but her daughter noted significantly worsened memory issues over the past year or so. Daughter manages pts medications.   - Recommend outpatient eval for dementia    Consultations This Hospital Stay   None    Code Status   Full Code    Time Spent on this Encounter   I, Steve Zapien, personally saw the patient today and spent approximately 35 minutes discharging this patient. Daughter Gloria requested to provide Dominican translation.       Steve Zapien MD  [facility]  ______________________________________________________________________    Physical Exam   Vital Signs: Temp: 98.3  F (36.8  C) Temp src: Oral BP: 119/57 Pulse: 64   Resp: 18 SpO2: 98 % O2 Device: None (Room air)    Weight: 84 lbs 4.8 oz    Constitutional: Thin elderly female in NAD  Eyes: Nonicteric, normal ocular movements  HEENT: Normocephalic, atraumatic, oral mucosa moist  Respiratory: CTAB, no wheezing or crackles  Cardiovascular: RRR, normal S1/2, no m/r/g  GI: No organomegaly, normoactive bowel sounds, nontender, nondistended  Skin: No rashes  Musculoskeletal: Normal strength in UE and LE, moves all extremities  Neurologic: A&O to self only  Psychiatric: Appropriate affect and mood       Primary Care Physician   Cordelia Clark    Discharge Disposition   Discharged to home  Condition at discharge: Stable    Significant Results and Procedures   Most Recent 3 CBC's:  Recent Labs   Lab Test 02/25/23  0759 02/24/23  1057 11/04/22  1311   WBC 6.3 5.6 7.9   HGB 8.6* 9.1* 9.4*   MCV 62*  63* 65*    217 209     Most Recent 3 BMP's:  Recent Labs   Lab Test 02/25/23  1137 02/25/23  0851 02/25/23  0759 02/24/23  1257 02/24/23  1057 11/04/22  1311   0000   NA  --   --  135*  --  126* 135  --    POTASSIUM  --   --  4.1  --  4.0 3.8  --    CHLORIDE  --   --  100  --  90* 102  --    CO2  --   --  25  --  23 25  --    BUN  --   --  35.7*  --  38.8* 32*  --    CR  --   --  1.56*  --  1.67* 1.61*  --    ANIONGAP  --   --  10  --  13 8  --    MANUEL  --   --  9.4  --  9.3 9.2  --    * 344* 359*   < > 592* 274*   < >    < > = values in this interval not displayed.     Most Recent 2 LFT's:No lab results found.  Most Recent 3 INR's:No lab results found.  Most Recent 3 Troponin's:No lab results found.  Most Recent 3 BNP's:No lab results found.  Most Recent D-dimer:  Recent Labs   Lab Test 02/24/23  1633   DD 0.81*     Most Recent Cholesterol Panel:No lab results found.    Results for orders placed or performed during the hospital encounter of 02/24/23   Chest XR,  PA & LAT    Narrative    CHEST TWO VIEWS 2/24/2023 11:49 AM     HISTORY: Dyspnea, chest pain    COMPARISON: None.       Impression    IMPRESSION: There are no acute infiltrates. The cardiac silhouette is  not enlarged. Pulmonary vasculature is unremarkable.    RATNA PISANO MD         SYSTEM ID:  [id number]       Discharge Orders      Reason for your hospital stay    You were hospitalized for chest pain. Your heart testing was negative. You should follow up with your primary care doctor.     Follow-up and recommended labs and tests     Follow up with primary care provider, Cordelia Clark, within 7 days for hospital follow- up.  No follow up labs or test are needed.     Activity    Your activity upon discharge: activity as tolerated     Diet    Follow this diet upon discharge:       Moderate Consistent Carb (60 g CHO per Meal) Diet     Discharge Medications   Current Discharge Medication List      CONTINUE these medications which have NOT  CHANGED    Details   aspirin (ASA) 81 MG chewable tablet Take 81 mg by mouth daily      brimonidine-timolol (COMBIGAN) 0.2-0.5 % ophthalmic solution Place 1 drop into both eyes 2 times daily      carvedilol (COREG) 6.25 MG tablet Take 6.25 mg by mouth 2 times daily (with meals)      darbepoetin claudette (ARANESP, ALBUMIN FREE,) 60 MCG/0.3ML injection Inject 60 mcg Subcutaneous every 14 days      docusate sodium (COLACE) 100 MG capsule Take 100 mg by mouth 2 times daily      glipiZIDE (GLUCOTROL XL) 10 MG 24 hr tablet Take 10 mg by mouth 2 times daily      insulin glargine (LANTUS PEN) 100 UNIT/ML pen Inject 15 Units Subcutaneous every morning      levocetirizine (XYZAL) 5 MG tablet Take 5 mg by mouth every evening      losartan (COZAAR) 50 MG tablet Take 50 mg by mouth daily      lovastatin (MEVACOR) 20 MG tablet Take 20 mg by mouth At Bedtime      metFORMIN (GLUCOPHAGE) 1000 MG tablet Take 1,000 mg by mouth 2 times daily (with meals)      NIFEdipine ER OSMOTIC (PROCARDIA XL) 60 MG 24 hr tablet Take 60 mg by mouth daily      sitagliptin (JANUVIA) 100 MG tablet Take 100 mg by mouth daily      triamterene-HCTZ (MAXZIDE-25) 37.5-25 MG tablet Take 1 tablet by mouth daily      vitamin D3 (CHOLECALCIFEROL) 50 mcg (2000 units) tablet Take 1 tablet by mouth daily           Allergies   No Known Allergies

## 2023-02-25 NOTE — PLAN OF CARE
Goal Outcome Evaluation:          Observation goals  PRIOR TO DISCHARGE        Comments: -diagnostic tests and consults completed and resulted not met  -vital signs normal or at patient baselinemet   -returns to baseline functional status not met  -safe disposition plan has been identified not met  Nurse to notify provider when observation goals have been met and patient is ready for discharge.

## 2023-02-26 ENCOUNTER — APPOINTMENT (OUTPATIENT)
Dept: CARDIOLOGY | Facility: CLINIC | Age: 83
End: 2023-02-26
Attending: STUDENT IN AN ORGANIZED HEALTH CARE EDUCATION/TRAINING PROGRAM
Payer: COMMERCIAL

## 2023-02-26 ENCOUNTER — APPOINTMENT (OUTPATIENT)
Dept: GENERAL RADIOLOGY | Facility: CLINIC | Age: 83
End: 2023-02-26
Attending: INTERNAL MEDICINE
Payer: COMMERCIAL

## 2023-02-26 ENCOUNTER — APPOINTMENT (OUTPATIENT)
Dept: PHYSICAL THERAPY | Facility: CLINIC | Age: 83
End: 2023-02-26
Attending: INTERNAL MEDICINE
Payer: COMMERCIAL

## 2023-02-26 VITALS
DIASTOLIC BLOOD PRESSURE: 63 MMHG | HEIGHT: 61 IN | BODY MASS INDEX: 15.92 KG/M2 | OXYGEN SATURATION: 100 % | TEMPERATURE: 97.8 F | SYSTOLIC BLOOD PRESSURE: 151 MMHG | HEART RATE: 60 BPM | WEIGHT: 84.3 LBS | RESPIRATION RATE: 16 BRPM

## 2023-02-26 LAB
ANION GAP SERPL CALCULATED.3IONS-SCNC: 13 MMOL/L (ref 7–15)
BUN SERPL-MCNC: 40.6 MG/DL (ref 8–23)
CALCIUM SERPL-MCNC: 9.3 MG/DL (ref 8.8–10.2)
CHLORIDE SERPL-SCNC: 97 MMOL/L (ref 98–107)
CREAT SERPL-MCNC: 1.66 MG/DL (ref 0.51–0.95)
DEPRECATED HCO3 PLAS-SCNC: 24 MMOL/L (ref 22–29)
GFR SERPL CREATININE-BSD FRML MDRD: 31 ML/MIN/1.73M2
GLUCOSE BLDC GLUCOMTR-MCNC: 209 MG/DL (ref 70–99)
GLUCOSE BLDC GLUCOMTR-MCNC: 304 MG/DL (ref 70–99)
GLUCOSE BLDC GLUCOMTR-MCNC: 357 MG/DL (ref 70–99)
GLUCOSE BLDC GLUCOMTR-MCNC: 412 MG/DL (ref 70–99)
GLUCOSE SERPL-MCNC: 285 MG/DL (ref 70–99)
LVEF ECHO: NORMAL
POTASSIUM SERPL-SCNC: 3.9 MMOL/L (ref 3.4–5.3)
SODIUM SERPL-SCNC: 134 MMOL/L (ref 136–145)
TROPONIN T SERPL HS-MCNC: 31 NG/L

## 2023-02-26 PROCEDURE — 36415 COLL VENOUS BLD VENIPUNCTURE: CPT | Performed by: INTERNAL MEDICINE

## 2023-02-26 PROCEDURE — 73610 X-RAY EXAM OF ANKLE: CPT | Mod: LT

## 2023-02-26 PROCEDURE — 250N000013 HC RX MED GY IP 250 OP 250 PS 637: Performed by: INTERNAL MEDICINE

## 2023-02-26 PROCEDURE — 82962 GLUCOSE BLOOD TEST: CPT | Mod: 91

## 2023-02-26 PROCEDURE — 97161 PT EVAL LOW COMPLEX 20 MIN: CPT | Mod: GP | Performed by: PHYSICAL THERAPIST

## 2023-02-26 PROCEDURE — 97530 THERAPEUTIC ACTIVITIES: CPT | Mod: GP | Performed by: PHYSICAL THERAPIST

## 2023-02-26 PROCEDURE — G0378 HOSPITAL OBSERVATION PER HR: HCPCS

## 2023-02-26 PROCEDURE — 80048 BASIC METABOLIC PNL TOTAL CA: CPT | Performed by: INTERNAL MEDICINE

## 2023-02-26 PROCEDURE — 99239 HOSP IP/OBS DSCHRG MGMT >30: CPT | Performed by: INTERNAL MEDICINE

## 2023-02-26 PROCEDURE — 93306 TTE W/DOPPLER COMPLETE: CPT

## 2023-02-26 PROCEDURE — 84484 ASSAY OF TROPONIN QUANT: CPT | Performed by: INTERNAL MEDICINE

## 2023-02-26 PROCEDURE — 93306 TTE W/DOPPLER COMPLETE: CPT | Mod: 26 | Performed by: INTERNAL MEDICINE

## 2023-02-26 PROCEDURE — 97116 GAIT TRAINING THERAPY: CPT | Mod: GP | Performed by: PHYSICAL THERAPIST

## 2023-02-26 RX ORDER — POLYETHYLENE GLYCOL 3350 17 G/17G
17 POWDER, FOR SOLUTION ORAL 2 TIMES DAILY PRN
Status: DISCONTINUED | OUTPATIENT
Start: 2023-02-26 | End: 2023-02-26 | Stop reason: HOSPADM

## 2023-02-26 RX ADMIN — CARVEDILOL 6.25 MG: 6.25 TABLET, FILM COATED ORAL at 08:03

## 2023-02-26 RX ADMIN — LOSARTAN POTASSIUM 50 MG: 50 TABLET, FILM COATED ORAL at 08:03

## 2023-02-26 RX ADMIN — INSULIN GLARGINE 15 UNITS: 100 INJECTION, SOLUTION SUBCUTANEOUS at 08:01

## 2023-02-26 RX ADMIN — NIFEDIPINE 60 MG: 60 TABLET, FILM COATED, EXTENDED RELEASE ORAL at 08:02

## 2023-02-26 RX ADMIN — ASPIRIN 81 MG CHEWABLE TABLET 81 MG: 81 TABLET CHEWABLE at 08:02

## 2023-02-26 RX ADMIN — POLYETHYLENE GLYCOL 3350 17 G: 17 POWDER, FOR SOLUTION ORAL at 14:25

## 2023-02-26 RX ADMIN — BRIMONIDINE TARTRATE, TIMOLOL MALEATE 1 DROP: 2; 5 SOLUTION/ DROPS OPHTHALMIC at 08:03

## 2023-02-26 ASSESSMENT — ACTIVITIES OF DAILY LIVING (ADL)
ADLS_ACUITY_SCORE: 41
ADLS_ACUITY_SCORE: 38
ADLS_ACUITY_SCORE: 41
ADLS_ACUITY_SCORE: 38
ADLS_ACUITY_SCORE: 41
ADLS_ACUITY_SCORE: 38

## 2023-02-26 NOTE — DISCHARGE SUMMARY
[facility]    Hospitalist Discharge Summary       Date of Admission:  2/24/2023  Date of Discharge:  2/26/2023  Discharging Provider: Steve Zapien MD    Discharge Diagnoses   Chest pain and shortness of breath, resolved  Hyperglycemia due to Type 2 DM, uncontrolled  CKD3  Hypertension    Follow-ups Needed After Discharge   Follow-up Appointments     Follow-up and recommended labs and tests       Follow up with primary care provider, Cordelia Clark, within 7 days for   hospital follow- up.  No follow up labs or test are needed.           Hospital Course   June MYLES Do is a 82 year old female with past medical history significant for insulin dependent diabetes, hypertension, hyperlipidemia, and probable dementia admitted on 2/24/2023 with weakness, chest pain and shortness of breath. Pt was reportedly complaining of chest pain and shortness of breath to her daughter earlier this morning. History is somewhat limited due to dementia and the patient does not recall having these symptoms earlier. She denies any current symptoms. Troponin is mildly elevated to 31, but trended down to 25 on repeat. EKG shows normal sinus rhythm without ST segment elevation. CXR is unremarkable. D dimer is 0.81 (age adjusted normal).    Patient without complaints, however continues to feel dyspnea at rest. BNP checked and normal. Echocardiogram was ordered and noted EF 65-70%, mild to moderate LVH, moderate pulmonary hypertension. Patient reported improved dyspnea at discharge. Stable to discharge with outpatient follow up.    Type II DM   Hyperglycemia without evidence of DKA  Blood sugars on admission was 592. No evidence of DKA   PTA regimen includes glargine 15 units qAM as well as Januvia, metformin and glipizide. Daughter reports patient does not regularly receive her long acting insulin (given 2-3 weekly) due to somnolence.  - Encouraged daughter to give Glargine 15 units daily without skipping  -  Outpatient insulin titration    Left ankle pain: Not swollen. XR left ankle no fracture. Could be related to arthritis.  - PT ordered. Outpatient PT as needed  - Analgesics such as tylenol, voltaren PRN as needed    Stage III CKD  Creatinine is 1.67. This appears to be near her baseline.     Essential Hypertension   PTA medications include carvedilol 6.25mg BID, losartan 50mg daily, nifedipine 60mg daily, Maxide 37.5-25mg daily.   - Maxzide was stopped this admission due to somewhat soft blood pressures. Restart in outpatient per outpatient provider    Dementia  Pt lives with her daughter. I don't believe she has a formal diagnosis of dementia, but her daughter noted significantly worsened memory issues over the past year or so. Daughter manages pts medications.   - Recommend outpatient eval for dementia    Consultations This Hospital Stay   PHYSICAL THERAPY ADULT IP CONSULT    Code Status   Full Code    Time Spent on this Encounter   I, Steve Zapien, personally saw the patient today and spent approximately 35 minutes discharging this patient. Ukrainian  used for encounter. Daughter Gloria updated regarding discharge.       Steve Zapien MD  [facility]  ______________________________________________________________________    Physical Exam   Vital Signs: Temp: 97.8  F (36.6  C) Temp src: Oral BP: (!) 151/63 Pulse: 60   Resp: 16 SpO2: 100 % O2 Device: None (Room air)    Weight: 84 lbs 4.8 oz    Constitutional: Thin elderly female in NAD  Eyes: Nonicteric, normal ocular movements  HEENT: Normocephalic, atraumatic, oral mucosa moist  Respiratory: CTAB, no wheezing or crackles  Cardiovascular: RRR, normal S1/2, no m/r/g  GI: No organomegaly, normoactive bowel sounds, nontender, nondistended  Skin: No rashes  Musculoskeletal: Normal strength in UE and LE, moves all extremities  Neurologic: A&O to self only  Psychiatric: Appropriate affect and mood       Primary Care Physician    Cordelia Clark    Discharge Disposition   Discharged to home  Condition at discharge: Stable    Significant Results and Procedures   Most Recent 3 CBC's:  Recent Labs   Lab Test 02/25/23  0759 02/24/23  1057 11/04/22  1311   WBC 6.3 5.6 7.9   HGB 8.6* 9.1* 9.4*   MCV 62* 63* 65*    217 209     Most Recent 3 BMP's:  Recent Labs   Lab Test 02/26/23  0727 02/26/23  0724 02/26/23  0306 02/25/23  0851 02/25/23  0759 02/24/23  1257 02/24/23  1057   NA  --  134*  --   --  135*  --  126*   POTASSIUM  --  3.9  --   --  4.1  --  4.0   CHLORIDE  --  97*  --   --  100  --  90*   CO2  --  24  --   --  25  --  23   BUN  --  40.6*  --   --  35.7*  --  38.8*   CR  --  1.66*  --   --  1.56*  --  1.67*   ANIONGAP  --  13  --   --  10  --  13   MANUEL  --  9.3  --   --  9.4  --  9.3   * 285* 357*   < > 359*   < > 592*    < > = values in this interval not displayed.     Most Recent 2 LFT's:No lab results found.  Most Recent 3 INR's:No lab results found.  Most Recent 3 Troponin's:No lab results found.  Most Recent 3 BNP's:  Recent Labs   Lab Test 02/25/23  0759   NTBNPI 251     Most Recent D-dimer:  Recent Labs   Lab Test 02/24/23  1633   DD 0.81*     Most Recent Cholesterol Panel:No lab results found.    Results for orders placed or performed during the hospital encounter of 02/24/23   Chest XR,  PA & LAT    Narrative    CHEST TWO VIEWS 2/24/2023 11:49 AM     HISTORY: Dyspnea, chest pain    COMPARISON: None.       Impression    IMPRESSION: There are no acute infiltrates. The cardiac silhouette is  not enlarged. Pulmonary vasculature is unremarkable.    RATNA PISANO MD         SYSTEM ID:  [id number]       Discharge Orders      Reason for your hospital stay    You were hospitalized for chest pain. Your heart testing was negative. You should follow up with your primary care doctor.     Follow-up and recommended labs and tests     Follow up with primary care provider, Cordelia Clark, within 7 days for hospital follow- up.   No follow up labs or test are needed.     Activity    Your activity upon discharge: activity as tolerated     Diet    Follow this diet upon discharge:       Moderate Consistent Carb (60 g CHO per Meal) Diet     Discharge Medications   Current Discharge Medication List      CONTINUE these medications which have NOT CHANGED    Details   aspirin (ASA) 81 MG chewable tablet Take 81 mg by mouth daily      brimonidine-timolol (COMBIGAN) 0.2-0.5 % ophthalmic solution Place 1 drop into both eyes 2 times daily      carvedilol (COREG) 6.25 MG tablet Take 6.25 mg by mouth 2 times daily (with meals)      darbepoetin claudette (ARANESP, ALBUMIN FREE,) 60 MCG/0.3ML injection Inject 60 mcg Subcutaneous every 14 days      docusate sodium (COLACE) 100 MG capsule Take 100 mg by mouth 2 times daily      glipiZIDE (GLUCOTROL XL) 10 MG 24 hr tablet Take 10 mg by mouth 2 times daily      insulin glargine (LANTUS PEN) 100 UNIT/ML pen Inject 15 Units Subcutaneous every morning      levocetirizine (XYZAL) 5 MG tablet Take 5 mg by mouth every evening      losartan (COZAAR) 50 MG tablet Take 50 mg by mouth daily      lovastatin (MEVACOR) 20 MG tablet Take 20 mg by mouth At Bedtime      metFORMIN (GLUCOPHAGE) 1000 MG tablet Take 1,000 mg by mouth 2 times daily (with meals)      NIFEdipine ER OSMOTIC (PROCARDIA XL) 60 MG 24 hr tablet Take 60 mg by mouth daily      sitagliptin (JANUVIA) 100 MG tablet Take 100 mg by mouth daily      vitamin D3 (CHOLECALCIFEROL) 50 mcg (2000 units) tablet Take 1 tablet by mouth daily         STOP taking these medications       triamterene-HCTZ (MAXZIDE-25) 37.5-25 MG tablet Comments:   Reason for Stopping:             Allergies   No Known Allergies

## 2023-02-26 NOTE — PLAN OF CARE
Goal Outcome Evaluation:          Observation goals  PRIOR TO DISCHARGE        Comments:   -Diagnostic tests and consults completed and resulted- Not met    -Vital signs normal or at patient baseline-Met     -Returns to baseline functional status -Not met    -Safe disposition plan has been identified -Not met    Nurse to notify provider when observation goals have been met and patient is ready for discharge.

## 2023-02-26 NOTE — PROGRESS NOTES
"[facility]  OUTPATIENT PHYSICAL THERAPY EVALUATION  PLAN OF TREATMENT FOR OUTPATIENT REHABILITATION  (COMPLETE FOR INITIAL CLAIMS ONLY)  Patient's Last Name, First Name, M.I.  YOB: 1940  June Shultz                        Provider's Name  [facility] Medical Record No.  [id number]                             Onset Date:  02/25/23   Start of Care Date:  02/26/23   Type:     _X_PT   ___OT   ___SLP Medical Diagnosis:  Chest pain and weakness              PT Diagnosis:  Impaired gait Visits from SOC:  1     See note for plan of treatment, functional goals and certification details    I CERTIFY THE NEED FOR THESE SERVICES FURNISHED UNDER        THIS PLAN OF TREATMENT AND WHILE UNDER MY CARE     (Physician co-signature of this document indicates review and certification of the therapy plan).             02/26/23 1502   Appointment Info   Signing Clinician's Name / Credentials (PT) Jyoti Yee PT,DPT   Living Environment   People in Home child(thalia), adult   Current Living Arrangements apartment   Home Accessibility no concerns   Transportation Anticipated family or friend will provide   Self-Care   Usual Activity Tolerance moderate   Current Activity Tolerance moderate   Regular Exercise Yes   Activity/Exercise Type walking   Equipment Currently Used at Home cane, straight  (has a 4WW, but it is \"too big\")   Fall history within last six months no   Activity/Exercise/Self-Care Comment Daughter helps with cooking, cleaning   General Information   Onset of Illness/Injury or Date of Surgery 02/25/23   Referring Physician Steve Zapien MD   Patient/Family Therapy Goals Statement (PT) Hopeful to go home today   Pertinent History of Current Problem (include personal factors and/or comorbidities that impact the POC) 82 year old female with past medical history significant for insulin dependent diabetes, hypertension, hyperlipidemia, and " "probable dementia admitted on 2/24/2023 with weakness, chest pain and shortness of breath. Later developed L foot pain.   Existing Precautions/Restrictions cardiac;fall   General Observations Lithuanian speaking, daughter and family friend in the room.   Cognition   Affect/Mental Status (Cognition) flat/blunted affect   Orientation Status (Cognition) person;place   Follows Commands (Cognition) follows one-step commands;delayed response/completion;increased processing time needed;initiation impaired;physical/tactile prompts required;repetition of directions required;verbal cues/prompting required;75-90% accuracy  (Language barrier, needs inc time for interpretation.)   Cognitive Status Comments Per chart pt with likely dementia. Daughter assists with ADLs and IADLs in home.   Pain Assessment   Patient Currently in Pain No   Integumentary/Edema   Integumentary/Edema Comments Warm pack to the posterior anlke at jimmy eof PT entry. Pt states her foot \"doesn't hurt anym ore.\" Rubs the achillies tendon and states \"Like a stabbing pain,\" was present overnight.   Posture    Posture Forward head position;Protracted shoulders;Kyphosis   Range of Motion (ROM)   ROM Comment L ankle WNL with DF just past neutral, no tenderness to palpation. Note the navicular bone protrudes, flat arches present B.   Strength (Manual Muscle Testing)   Strength Comments Demonstrates antigravity strength wtih functional mobiltiy, does fatigue. Note little muscle bulk present.   Bed Mobility   Comment, (Bed Mobility) independent   Transfers   Comment, (Transfers) independent sit<>stand and toilet transfer   Gait/Stairs (Locomotion)   Comment, (Gait/Stairs) Gait in room, step to pattern with walker.   Balance   Balance Comments Static stance and dynamic stance doffs depend, picks it up from the floor and able to toss into the trash with ease.   Sensory Examination   Sensory Perception Comments Denies n/t B LEs   Clinical Impression   Criteria for " Skilled Therapeutic Intervention Yes, treatment indicated   PT Diagnosis (PT) Impaired gait   Influenced by the following impairments Generalized weakness, L foot pain   Functional limitations due to impairments Decreased functional independence   Clinical Presentation (PT Evaluation Complexity) Stable/Uncomplicated   Clinical Presentation Rationale see MR   Clinical Decision Making (Complexity) low complexity   Planned Therapy Interventions (PT) gait training;home exercise program;ROM (range of motion);strengthening;stretching;transfer training   Anticipated Equipment Needs at Discharge (PT) walker, rolling  (front wheeled walker)   Risk & Benefits of therapy have been explained evaluation/treatment results reviewed;care plan/treatment goals reviewed;risks/benefits reviewed;current/potential barriers reviewed;participants voiced agreement with care plan;participants included;patient;daughter;friend   PT Total Evaluation Time   PT Eval, Low Complexity Minutes (12690) 10   Therapy Certification   Start of care date 02/26/23   Certification date from 02/26/23   Certification date to 02/26/23   Medical Diagnosis Chest pain and weakness   Physical Therapy Goals   PT Frequency One time eval and treatment only   PT Predicted Duration/Target Date for Goal Attainment 02/26/23   PT Goals Bed Mobility;Transfers;Gait   PT: Bed Mobility Independent;Supine to/from sit;Rolling;Goal Met   PT: Transfers Modified independent;Sit to/from stand;Bed to/from chair;Assistive device;Goal Met   PT: Gait Modified independent;Greater than 200 feet;Rolling walker;Goal Met   Interventions   Interventions Quick Adds Gait Training;Therapeutic Activity   Therapeutic Activity   Therapeutic Activities: dynamic activities to improve functional performance Minutes (91430) 15   Symptoms Noted During/After Treatment None   Treatment Detail/Skilled Intervention Edu pt and family on safety with walker use during transfers and navigation in the bathroom.  Visual demo of staying in the frame with navigating from toilet to sink, how to safely turn and place when not in use. Pt demonstrates good use with trial in bathroom. Of note pt with saturated brief and unaware. Min A for gown change. Reviewed sit<>stand including hand placement on stable surface up and down, only using the walker once in a full standing position and avoiding holding the walker during descent. Demo provided pt demonstrates understanding. Family verbalized understanding. Edu pt to use APs and to move frequently, walk each hour to keep ankle from getting too stiff. Rec if continued pain to see OP PT for foot and ankle strengthening.   Gait Training   Gait Training Minutes (89206) 10   Symptoms Noted During/After Treatment (Gait Training) none   Treatment Detail/Skilled Intervention Gait training included visual demo of a step to pattern, progressing to a reciprocal pattern with use of a front wheeled walker. Pt uses a single end cane at baseline, but feels her foot pain limits the use of the cane only. Ambulated 200' with the walker no LOB, good start/stop and turning L and R with use. Walker order placed for home. Pt and famiy feel this walker is much better suited for the pt and their space in home.   PT Discharge Planning   PT Plan disch from PT goals met.   PT Discharge Recommendation (DC Rec) home with assist   PT Rationale for DC Rec Home with continued assist for ADLs and IADLs from family as prior to admit. Rec continued use of a walker, walker provided for disch. If foot pain returns, may benefit from an OP PT evaluation. Today the pt denies foot pain, but feels stronger with use of a walker.   PT Brief overview of current status Independent bed mobility and transfers, Mod I with ambulation using walker.   Total Session Time   Timed Code Treatment Minutes 25   Total Session Time (sum of timed and untimed services) 35

## 2023-02-26 NOTE — DISCHARGE SUMMARY
AVS printed. Pt discharged to home via daughter Gloria. Pt discharged with clothes, walker, glasses.

## 2023-02-26 NOTE — PROVIDER NOTIFICATION
MD Notification    Notified Person: MD    Notified Person Name:Dr Zapien     Notification Date/Time:0918 2/26/23    Notification Interaction:vocera     Purpose of Notification:FYI, pt complaining of left ankle/foot pain. Very painful when standing/applying pressure to foot. No visible swelling or redness.       Orders Received:    Comments:xray of ankle and PT consult ordered

## 2023-02-26 NOTE — PLAN OF CARE
Shift:1900-3184 2/26/23  Summary:weakness, CP, resolved. Lithuanian speaking   Orientation/Cognitive: A/Ox3, redirectable   Observation Goals (Met/ Not Met): met  Mobility Level/Assist Equipment:A1 GB/walker  Fall Risk (Y/N): yes  Behavior Concerns:none  Pain Management: ankle pain treated with heating pack, resolved   Tele/VS/O2:VSS on room air  ABNL Lab/BG:bg 304, 209, creatinine 1.66  Diet:Mod carb diet   Bowel/Bladder:voiding   Skin Concerns: dry, flaky skin  Drains/Devices:IV SL  Tests/Procedures for next shift:PT consult pending   Anticipated DC date & active delays:discharge today after PT consult    Patient Stated Goal for Today:  Other important info:

## 2023-02-26 NOTE — PROVIDER NOTIFICATION
MD Notification    Notified Person: MD    Notified Person Name:Tushar    Notification Date/Time:2/26/23@0310    Notification Interaction:Paged via [employer] web    Purpose of Notification:Pt's blood sugar remains elevated despite not eating nor drinking anything since bedtime, any new orders?    Orders Received:Novolog 3units    Comments:

## 2023-02-26 NOTE — PLAN OF CARE
Goal Outcome Evaluation:          Observation goals  PRIOR TO DISCHARGE        Comments:   -Diagnostic tests and consults completed and resulted- Not met    -Vital signs normal or at patient baseline-Met     -Returns to baseline functional status -Not met    -Safe disposition plan has been identified -Not met    Nurse to notify provider when observation goals have been met and patient is ready for discharge.          Orientation/Cognitive: A&O, Somali speaking (needs )  Observation Goals (Met/ Not Met): Not met   Mobility Level/Assist Equipment:  Ax1GB/Walker   Fall Risk (Y/N): Yes   Behavior Concerns: None   Pain Management: Denies pain   Tele/VS/O2: VSS on RA  ABNL Lab/BG: None this shift  Diet: Mod CHO diet   Bowel/Bladder: Incontinent of urine, no BM this shift  Skin Concerns: None   Drains/Devices: PIV SL   Tests/Procedures for next shift: ECHO  Anticipated DC date & active delays: TBD   Patient Stated Goal for Today: Rest

## 2023-02-26 NOTE — PROGRESS NOTES
Observation goals  PRIOR TO DISCHARGE        Comments: -diagnostic tests and consults completed and resulted: not met   -vital signs normal or at patient baseline: met    -returns to baseline functional status: not met    -safe disposition plan has been identified met    Nurse to notify provider when observation goals have been met and patient is ready for discharge.

## 2023-02-26 NOTE — PLAN OF CARE
Goal Outcome Evaluation:       Orientation/Cognitive:alert daniela confused  Observation Goals (Met/ Not Met): not met  Mobility Level/Assist Equipment: amb. With belt, cane and assistance  Fall Risk (Y/N): y  Behavior Concerns: n  Pain Management:tylenol  Tele/VS/O2: low grade temp SR  ABNL Lab/BG: blood sugsrs elevated, covered with s/slactic, na, d-dimer off  Diet: reg  Bowel/Bladder:continent  Skin Concerns: n  Drains/Devicesn  Tests/Procedures for next shift:echo  Anticipated DC date & active delays: archana  Patient Stated Goal for Today: n

## 2023-02-28 ENCOUNTER — HOSPITAL ENCOUNTER (EMERGENCY)
Facility: CLINIC | Age: 83
Discharge: HOME | End: 2023-02-28
Attending: EMERGENCY MEDICINE | Admitting: EMERGENCY MEDICINE
Payer: COMMERCIAL

## 2023-02-28 VITALS — OXYGEN SATURATION: 98 % | BODY MASS INDEX: 15.5 KG/M2 | HEIGHT: 62 IN | WEIGHT: 84.22 LBS

## 2023-02-28 DIAGNOSIS — R42 LIGHTHEADEDNESS: ICD-10-CM

## 2023-02-28 DIAGNOSIS — R73.9 HYPERGLYCEMIA: ICD-10-CM

## 2023-02-28 LAB
ALBUMIN SERPL BCG-MCNC: 3.9 G/DL (ref 3.5–5.2)
ALP SERPL-CCNC: 77 U/L (ref 35–104)
ALT SERPL W P-5'-P-CCNC: 9 U/L (ref 10–35)
ANION GAP SERPL CALCULATED.3IONS-SCNC: 16 MMOL/L (ref 7–15)
AST SERPL W P-5'-P-CCNC: 13 U/L (ref 10–35)
ATRIAL RATE - MUSE: 66 BPM
BASOPHILS # BLD AUTO: 0 10E3/UL (ref 0–0.2)
BASOPHILS NFR BLD AUTO: 0 %
BILIRUB SERPL-MCNC: 0.6 MG/DL
BUN SERPL-MCNC: 42.4 MG/DL (ref 8–23)
CALCIUM SERPL-MCNC: 9.1 MG/DL (ref 8.8–10.2)
CHLORIDE SERPL-SCNC: 97 MMOL/L (ref 98–107)
CREAT SERPL-MCNC: 1.87 MG/DL (ref 0.51–0.95)
DEPRECATED HCO3 PLAS-SCNC: 20 MMOL/L (ref 22–29)
DIASTOLIC BLOOD PRESSURE - MUSE: NORMAL MMHG
EOSINOPHIL # BLD AUTO: 0 10E3/UL (ref 0–0.7)
EOSINOPHIL NFR BLD AUTO: 0 %
ERYTHROCYTE [DISTWIDTH] IN BLOOD BY AUTOMATED COUNT: 16.4 % (ref 10–15)
GFR SERPL CREATININE-BSD FRML MDRD: 27 ML/MIN/1.73M2
GLUCOSE SERPL-MCNC: 295 MG/DL (ref 70–99)
HCT VFR BLD AUTO: 29.2 % (ref 35–47)
HGB BLD-MCNC: 8.9 G/DL (ref 11.7–15.7)
HOLD SPECIMEN: NORMAL
IMM GRANULOCYTES # BLD: 0 10E3/UL
IMM GRANULOCYTES NFR BLD: 0 %
INTERPRETATION ECG - MUSE: NORMAL
LYMPHOCYTES # BLD AUTO: 1.2 10E3/UL (ref 0.8–5.3)
LYMPHOCYTES NFR BLD AUTO: 18 %
MAGNESIUM SERPL-MCNC: 1.8 MG/DL (ref 1.7–2.3)
MCH RBC QN AUTO: 19.9 PG (ref 26.5–33)
MCHC RBC AUTO-ENTMCNC: 30.5 G/DL (ref 31.5–36.5)
MCV RBC AUTO: 65 FL (ref 78–100)
MONOCYTES # BLD AUTO: 0.8 10E3/UL (ref 0–1.3)
MONOCYTES NFR BLD AUTO: 13 %
NEUTROPHILS # BLD AUTO: 4.5 10E3/UL (ref 1.6–8.3)
NEUTROPHILS NFR BLD AUTO: 69 %
NRBC # BLD AUTO: 0 10E3/UL
NRBC BLD AUTO-RTO: 0 /100
P AXIS - MUSE: 46 DEGREES
PLATELET # BLD AUTO: 252 10E3/UL (ref 150–450)
POTASSIUM SERPL-SCNC: 4.6 MMOL/L (ref 3.4–5.3)
PR INTERVAL - MUSE: 148 MS
PROT SERPL-MCNC: 6.9 G/DL (ref 6.4–8.3)
QRS DURATION - MUSE: 86 MS
QT - MUSE: 384 MS
QTC - MUSE: 402 MS
R AXIS - MUSE: -45 DEGREES
RBC # BLD AUTO: 4.47 10E6/UL (ref 3.8–5.2)
SODIUM SERPL-SCNC: 133 MMOL/L (ref 136–145)
SYSTOLIC BLOOD PRESSURE - MUSE: NORMAL MMHG
T AXIS - MUSE: 37 DEGREES
VENTRICULAR RATE- MUSE: 66 BPM
WBC # BLD AUTO: 6.7 10E3/UL (ref 4–11)

## 2023-02-28 PROCEDURE — 93005 ELECTROCARDIOGRAM TRACING: CPT

## 2023-02-28 PROCEDURE — 85025 COMPLETE CBC W/AUTO DIFF WBC: CPT | Performed by: EMERGENCY MEDICINE

## 2023-02-28 PROCEDURE — 36415 COLL VENOUS BLD VENIPUNCTURE: CPT | Performed by: EMERGENCY MEDICINE

## 2023-02-28 PROCEDURE — 80053 COMPREHEN METABOLIC PANEL: CPT | Performed by: EMERGENCY MEDICINE

## 2023-02-28 PROCEDURE — 99284 EMERGENCY DEPT VISIT MOD MDM: CPT | Mod: 25

## 2023-02-28 PROCEDURE — 258N000003 HC RX IP 258 OP 636: Performed by: EMERGENCY MEDICINE

## 2023-02-28 PROCEDURE — 83735 ASSAY OF MAGNESIUM: CPT | Performed by: EMERGENCY MEDICINE

## 2023-02-28 PROCEDURE — 96360 HYDRATION IV INFUSION INIT: CPT

## 2023-02-28 RX ADMIN — SODIUM CHLORIDE 1000 ML: 9 INJECTION, SOLUTION INTRAVENOUS at 14:22

## 2023-02-28 ASSESSMENT — ACTIVITIES OF DAILY LIVING (ADL): ADLS_ACUITY_SCORE: 35

## 2023-02-28 ASSESSMENT — ENCOUNTER SYMPTOMS: DIZZINESS: 1

## 2023-02-28 NOTE — ED NOTES
Spoke briefly to daughter of patient. She called from work to speak to her mother. Daughter does not know who called EMS or why. States that her brother is now on his way to the ED and can speak to provider when he gets here

## 2023-02-28 NOTE — ED PROVIDER NOTES
"  History     Chief Complaint:  Dizziness       The history is limited by the condition of the patient.      June MYLES Do is a 82 year old female with a history of diabetes, hypertension, diabetic retinopathy, dementia who presents with dizziness. The patient comes in after clicking her life alert button after an episode of dizziness that she often experiences. Upon my evaluation the patient is feeling back to baseline.     Independent Historian:   Caregiver - They report as above    Review of External Notes: I reviewed the patients note and discharge summary from 2/24/2023.      ROS:  Review of Systems   Neurological: Positive for dizziness.   All other systems reviewed and are negative.    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 She does not have any smokeless tobacco history on file. She reports that she does not currently use alcohol. She reports that she does not currently use drugs.  PCP: Cordelia Clark     Physical Exam     Patient Vitals for the past 24 hrs:   BP Temp Temp src Pulse Resp SpO2 Height Weight   02/28/23 1445 -- -- -- -- -- -- 1.575 m (5' 2\") 38.2 kg (84 lb 3.5 oz)   02/28/23 1444 -- -- -- -- -- 98 % -- --   02/28/23 1430 -- -- -- -- -- 99 % -- --   02/28/23 1429 -- -- -- -- -- 100 % -- --   02/28/23 1414 -- -- -- -- -- 100 % -- --   02/28/23 1411 (!) (P) 146/75 (P) 98.2  F (36.8  C) (P) Oral (P) 69 (P) 18 (P) 99 % -- --        Physical Exam  Eye:  Pupils are equal, round, and reactive.  Extraocular movements intact.    ENT:  No rhinorrhea.  Moist mucus membranes.  Normal tongue and " tonsil.    Cardiac:  Regular rate and rhythm.  No murmurs, gallops, or rubs.    Pulmonary:  Clear to auscultation bilaterally.  No wheezes, rales, or rhonchi.    Abdomen:  Positive bowel sounds.  Abdomen is soft and non-distended, without focal tenderness.    Musculoskeletal:  Normal movement of all extremities without evidence for deficit.    Skin:  Warm and dry without rashes.    Neurologic:  Non-focal exam without asymmetric weakness or numbness.     Psychiatric:  Normal affect with appropriate interaction with examiner.    Emergency Department Course     ECG results from 02/28/23   EKG 12-lead, tracing only     Value    Systolic Blood Pressure     Diastolic Blood Pressure     Ventricular Rate 66    Atrial Rate 66    VT Interval 148    QRS Duration 86        QTc 402    P Axis 46    R AXIS -45    T Axis 37    Interpretation ECG      Sinus rhythm  Left axis deviation  Abnormal ECG  When compared with ECG of 24-FEB-2023 11:48,  Nonspecific T wave abnormality, improved in Lateral leads         Laboratory:  Labs Ordered and Resulted from Time of ED Arrival to Time of ED Departure   COMPREHENSIVE METABOLIC PANEL - Abnormal       Result Value    Sodium 133 (*)     Potassium 4.6      Chloride 97 (*)     Carbon Dioxide (CO2) 20 (*)     Anion Gap 16 (*)     Urea Nitrogen 42.4 (*)     Creatinine 1.87 (*)     Calcium 9.1      Glucose 295 (*)     Alkaline Phosphatase 77      AST 13      ALT 9 (*)     Protein Total 6.9      Albumin 3.9      Bilirubin Total 0.6      GFR Estimate 27 (*)    CBC WITH PLATELETS AND DIFFERENTIAL - Abnormal    WBC Count 6.7      RBC Count 4.47      Hemoglobin 8.9 (*)     Hematocrit 29.2 (*)     MCV 65 (*)     MCH 19.9 (*)     MCHC 30.5 (*)     RDW 16.4 (*)     Platelet Count 252      % Neutrophils 69      % Lymphocytes 18      % Monocytes 13      % Eosinophils 0      % Basophils 0      % Immature Granulocytes 0      NRBCs per 100 WBC 0      Absolute Neutrophils 4.5      Absolute Lymphocytes 1.2       Absolute Monocytes 0.8      Absolute Eosinophils 0.0      Absolute Basophils 0.0      Absolute Immature Granulocytes 0.0      Absolute NRBCs 0.0     MAGNESIUM - Normal    Magnesium 1.8          Emergency Department Course & Assessments:         Interventions:  Medications   sodium chloride (PF) 0.9% PF flush 3 mL (has no administration in time range)   sodium chloride (PF) 0.9% PF flush 3 mL (has no administration in time range)   0.9% sodium chloride BOLUS (1,000 mLs Intravenous $New Bag 2/28/23 5624)        Independent Interpretation (X-rays, CTs, rhythm strip):  None      Consultations/Discussion of Management or Tests:  None      Social Determinants of Health affecting care:   None    Assessments:  1624 I examined the patient and obtained history     Disposition:  The patient was discharged to home.     Impression & Plan    CMS Diagnoses: None    Medical Decision Making:  This elderly woman presents to us for lightheadedness.  I invite the reader to review her admission to the hospital 4 days ago where she was thoroughly worked up for lightheadedness, hypertension, hyperglycemia, and weakness.  She was discharged 2 days ago.  Her family was unaware that she had pressed her life alert button while they were out of the house.  She told paramedics that she was feeling lightheaded and was found to have an elevated blood pressure and therefore she was brought to the emergency department secondary to a language barrier, and a frail elderly woman with these complaints.  However, upon arrival to us, she has no complaints.  She does not exactly recall why she hit her life alert button (patient is significantly demented).    The patient's exam is reassuring.  Vital signs are normal.  Her blood sugar continues to be elevated, though she ranged from the 200s to 400s during her inpatient stay last week.  Family is unaware whether she took her morning meds.  Nonetheless, the remainder of her labs are reassuring and she  was given a liter of fluid.  She has no complaints of pain at this time.  Her son feels comfortable taking her home.  I see no indication for admission to the hospital.  Advised that they continue to watch her sugars closely and work on diet and medication compliance.  They were otherwise invited back to our facility at any point for worsening of condition or other emergent concerns.    Diagnosis:    ICD-10-CM    1. Lightheadedness  R42       2. Hyperglycemia  R73.9            Scribe Disclosure:  I, Romaine Benton, am serving as a scribe at 2:19 PM on 2/28/2023 to document services personally performed by Trierweiler, Chad A, MD based on my observations and the provider's statements to me.     2/28/2023   Trierweiler, Chad A, MD Trierweiler, Chad A, MD  03/01/23 0857

## 2023-02-28 NOTE — ED TRIAGE NOTES
BIBA from home. Family called and reported patient has high BP and is feeling dizzy. Just discharged a couple days ago. BG = 447 for EMS, they gave 250 NS bolus, no other meds given     Triage Assessment       Row Name 02/28/23 1412       Triage Assessment (Adult)    Airway WDL WDL       Respiratory WDL    Respiratory WDL WDL       Skin Circulation/Temperature WDL    Skin Circulation/Temperature WDL WDL       Peripheral/Neurovascular WDL    Peripheral Neurovascular WDL WDL       Cognitive/Neuro/Behavioral WDL    Cognitive/Neuro/Behavioral WDL WDL

## 2023-05-13 ENCOUNTER — APPOINTMENT (OUTPATIENT)
Dept: GENERAL RADIOLOGY | Facility: CLINIC | Age: 83
DRG: 637 | End: 2023-05-13
Attending: EMERGENCY MEDICINE
Payer: COMMERCIAL

## 2023-05-13 ENCOUNTER — HOSPITAL ENCOUNTER (INPATIENT)
Facility: CLINIC | Age: 83
LOS: 7 days | Discharge: HOME HEALTH | DRG: 637 | End: 2023-05-20
Attending: EMERGENCY MEDICINE | Admitting: INTERNAL MEDICINE
Payer: COMMERCIAL

## 2023-05-13 ENCOUNTER — APPOINTMENT (OUTPATIENT)
Dept: CT IMAGING | Facility: CLINIC | Age: 83
DRG: 637 | End: 2023-05-13
Attending: EMERGENCY MEDICINE
Payer: COMMERCIAL

## 2023-05-13 DIAGNOSIS — E11.649 SEVERE DIABETIC HYPOGLYCEMIA (H): Primary | ICD-10-CM

## 2023-05-13 DIAGNOSIS — R40.1 STUPOR: ICD-10-CM

## 2023-05-13 DIAGNOSIS — J96.02 ACUTE RESPIRATORY FAILURE WITH HYPERCAPNIA (H): ICD-10-CM

## 2023-05-13 LAB
ALBUMIN SERPL BCG-MCNC: 3.6 G/DL (ref 3.5–5.2)
ALP SERPL-CCNC: 66 U/L (ref 35–104)
ALT SERPL W P-5'-P-CCNC: 10 U/L (ref 10–35)
ANION GAP SERPL CALCULATED.3IONS-SCNC: 10 MMOL/L (ref 7–15)
AST SERPL W P-5'-P-CCNC: 24 U/L (ref 10–35)
ATRIAL RATE - MUSE: 103 BPM
BASOPHILS # BLD AUTO: 0 10E3/UL (ref 0–0.2)
BASOPHILS NFR BLD AUTO: 0 %
BILIRUB SERPL-MCNC: <0.2 MG/DL
BUN SERPL-MCNC: 39.1 MG/DL (ref 8–23)
CALCIUM SERPL-MCNC: 8.7 MG/DL (ref 8.8–10.2)
CHLORIDE SERPL-SCNC: 99 MMOL/L (ref 98–107)
CK SERPL-CCNC: 26 U/L (ref 26–192)
CPB POCT: NO
CREAT SERPL-MCNC: 1.87 MG/DL (ref 0.51–0.95)
DEPRECATED HCO3 PLAS-SCNC: 25 MMOL/L (ref 22–29)
DIASTOLIC BLOOD PRESSURE - MUSE: NORMAL MMHG
EOSINOPHIL # BLD AUTO: 0 10E3/UL (ref 0–0.7)
EOSINOPHIL NFR BLD AUTO: 0 %
ERYTHROCYTE [DISTWIDTH] IN BLOOD BY AUTOMATED COUNT: 15.3 % (ref 10–15)
GFR SERPL CREATININE-BSD FRML MDRD: 27 ML/MIN/1.73M2
GLUCOSE BLDC GLUCOMTR-MCNC: 15 MG/DL (ref 70–99)
GLUCOSE BLDC GLUCOMTR-MCNC: 211 MG/DL (ref 70–99)
GLUCOSE BLDC GLUCOMTR-MCNC: 283 MG/DL (ref 70–99)
GLUCOSE BLDC GLUCOMTR-MCNC: 311 MG/DL (ref 70–99)
GLUCOSE BLDC GLUCOMTR-MCNC: 327 MG/DL (ref 70–99)
GLUCOSE BLDC GLUCOMTR-MCNC: 344 MG/DL (ref 70–99)
GLUCOSE BLDC GLUCOMTR-MCNC: 392 MG/DL (ref 70–99)
GLUCOSE BLDC GLUCOMTR-MCNC: 510 MG/DL (ref 70–99)
GLUCOSE SERPL-MCNC: 438 MG/DL (ref 70–99)
HCO3 BLDV-SCNC: 20 MMOL/L (ref 21–28)
HCO3 BLDV-SCNC: 27 MMOL/L (ref 21–28)
HCO3 BLDV-SCNC: 28 MMOL/L (ref 21–28)
HCT VFR BLD AUTO: 30 % (ref 35–47)
HCT VFR BLD CALC: 35 % (ref 35–47)
HGB BLD-MCNC: 11.9 G/DL (ref 11.7–15.7)
HGB BLD-MCNC: 9 G/DL (ref 11.7–15.7)
HOLD SPECIMEN: NORMAL
HOLD SPECIMEN: NORMAL
IMM GRANULOCYTES # BLD: 0 10E3/UL
IMM GRANULOCYTES NFR BLD: 0 %
INTERPRETATION ECG - MUSE: NORMAL
LACTATE BLD-SCNC: 4.9 MMOL/L
LACTATE BLD-SCNC: 6.4 MMOL/L
LYMPHOCYTES # BLD AUTO: 1.6 10E3/UL (ref 0.8–5.3)
LYMPHOCYTES NFR BLD AUTO: 18 %
MCH RBC QN AUTO: 20 PG (ref 26.5–33)
MCHC RBC AUTO-ENTMCNC: 30 G/DL (ref 31.5–36.5)
MCV RBC AUTO: 67 FL (ref 78–100)
MONOCYTES # BLD AUTO: 0.3 10E3/UL (ref 0–1.3)
MONOCYTES NFR BLD AUTO: 3 %
NEUTROPHILS # BLD AUTO: 6.8 10E3/UL (ref 1.6–8.3)
NEUTROPHILS NFR BLD AUTO: 79 %
NRBC # BLD AUTO: 0 10E3/UL
NRBC BLD AUTO-RTO: 0 /100
P AXIS - MUSE: 61 DEGREES
PCO2 BLDV: 50 MM HG (ref 40–50)
PCO2 BLDV: 87 MM HG (ref 40–50)
PCO2 BLDV: 87 MM HG (ref 40–50)
PH BLDV: 7.1 [PH] (ref 7.32–7.43)
PH BLDV: 7.12 [PH] (ref 7.32–7.43)
PH BLDV: 7.2 [PH] (ref 7.32–7.43)
PHOSPHATE SERPL-MCNC: 4.1 MG/DL (ref 2.5–4.5)
PLATELET # BLD AUTO: 193 10E3/UL (ref 150–450)
PO2 BLDV: 31 MM HG (ref 25–47)
POTASSIUM BLD-SCNC: 6.1 MMOL/L (ref 3.4–5.3)
POTASSIUM SERPL-SCNC: 4.8 MMOL/L (ref 3.4–5.3)
PR INTERVAL - MUSE: 174 MS
PROCALCITONIN SERPL IA-MCNC: 0.05 NG/ML
PROT SERPL-MCNC: 6.3 G/DL (ref 6.4–8.3)
QRS DURATION - MUSE: 82 MS
QT - MUSE: 380 MS
QTC - MUSE: 497 MS
R AXIS - MUSE: -53 DEGREES
RBC # BLD AUTO: 4.51 10E6/UL (ref 3.8–5.2)
SAO2 % BLDV: 38 % (ref 94–100)
SAO2 % BLDV: 39 % (ref 94–100)
SAO2 % BLDV: 46 % (ref 94–100)
SODIUM BLD-SCNC: 136 MMOL/L (ref 133–144)
SODIUM SERPL-SCNC: 134 MMOL/L (ref 136–145)
SYSTOLIC BLOOD PRESSURE - MUSE: NORMAL MMHG
T AXIS - MUSE: 63 DEGREES
VENTRICULAR RATE- MUSE: 103 BPM
WBC # BLD AUTO: 8.8 10E3/UL (ref 4–11)

## 2023-05-13 PROCEDURE — 80053 COMPREHEN METABOLIC PANEL: CPT | Performed by: EMERGENCY MEDICINE

## 2023-05-13 PROCEDURE — 83036 HEMOGLOBIN GLYCOSYLATED A1C: CPT | Performed by: INTERNAL MEDICINE

## 2023-05-13 PROCEDURE — 999N000157 HC STATISTIC RCP TIME EA 10 MIN

## 2023-05-13 PROCEDURE — 36415 COLL VENOUS BLD VENIPUNCTURE: CPT | Performed by: EMERGENCY MEDICINE

## 2023-05-13 PROCEDURE — 82962 GLUCOSE BLOOD TEST: CPT

## 2023-05-13 PROCEDURE — 82803 BLOOD GASES ANY COMBINATION: CPT

## 2023-05-13 PROCEDURE — 70450 CT HEAD/BRAIN W/O DYE: CPT

## 2023-05-13 PROCEDURE — 93005 ELECTROCARDIOGRAM TRACING: CPT

## 2023-05-13 PROCEDURE — 120N000001 HC R&B MED SURG/OB

## 2023-05-13 PROCEDURE — 84100 ASSAY OF PHOSPHORUS: CPT | Performed by: EMERGENCY MEDICINE

## 2023-05-13 PROCEDURE — 99291 CRITICAL CARE FIRST HOUR: CPT | Mod: 25

## 2023-05-13 PROCEDURE — 82550 ASSAY OF CK (CPK): CPT | Performed by: EMERGENCY MEDICINE

## 2023-05-13 PROCEDURE — 99292 CRITICAL CARE ADDL 30 MIN: CPT

## 2023-05-13 PROCEDURE — 83605 ASSAY OF LACTIC ACID: CPT

## 2023-05-13 PROCEDURE — 258N000001 HC RX 258: Performed by: EMERGENCY MEDICINE

## 2023-05-13 PROCEDURE — 85004 AUTOMATED DIFF WBC COUNT: CPT | Performed by: EMERGENCY MEDICINE

## 2023-05-13 PROCEDURE — 84145 PROCALCITONIN (PCT): CPT | Performed by: EMERGENCY MEDICINE

## 2023-05-13 PROCEDURE — 96375 TX/PRO/DX INJ NEW DRUG ADDON: CPT

## 2023-05-13 PROCEDURE — 71045 X-RAY EXAM CHEST 1 VIEW: CPT

## 2023-05-13 PROCEDURE — 96376 TX/PRO/DX INJ SAME DRUG ADON: CPT

## 2023-05-13 PROCEDURE — 84295 ASSAY OF SERUM SODIUM: CPT

## 2023-05-13 PROCEDURE — 96374 THER/PROPH/DIAG INJ IV PUSH: CPT

## 2023-05-13 PROCEDURE — 250N000011 HC RX IP 250 OP 636: Performed by: EMERGENCY MEDICINE

## 2023-05-13 PROCEDURE — 99223 1ST HOSP IP/OBS HIGH 75: CPT | Performed by: INTERNAL MEDICINE

## 2023-05-13 PROCEDURE — 87040 BLOOD CULTURE FOR BACTERIA: CPT | Performed by: EMERGENCY MEDICINE

## 2023-05-13 PROCEDURE — 258N000003 HC RX IP 258 OP 636: Performed by: EMERGENCY MEDICINE

## 2023-05-13 RX ORDER — LABETALOL HYDROCHLORIDE 5 MG/ML
20 INJECTION, SOLUTION INTRAVENOUS ONCE
Status: DISCONTINUED | OUTPATIENT
Start: 2023-05-13 | End: 2023-05-15

## 2023-05-13 RX ORDER — DEXTROSE MONOHYDRATE 25 G/50ML
50 INJECTION, SOLUTION INTRAVENOUS ONCE
Status: COMPLETED | OUTPATIENT
Start: 2023-05-13 | End: 2023-05-13

## 2023-05-13 RX ORDER — ONDANSETRON 2 MG/ML
4 INJECTION INTRAMUSCULAR; INTRAVENOUS ONCE
Status: COMPLETED | OUTPATIENT
Start: 2023-05-13 | End: 2023-05-13

## 2023-05-13 RX ADMIN — ONDANSETRON 4 MG: 2 INJECTION INTRAMUSCULAR; INTRAVENOUS at 20:02

## 2023-05-13 RX ADMIN — DEXTROSE MONOHYDRATE 50 ML: 25 INJECTION, SOLUTION INTRAVENOUS at 20:01

## 2023-05-13 RX ADMIN — DEXTROSE MONOHYDRATE 50 ML: 25 INJECTION, SOLUTION INTRAVENOUS at 19:40

## 2023-05-13 RX ADMIN — SODIUM CHLORIDE 500 ML: 9 INJECTION, SOLUTION INTRAVENOUS at 20:54

## 2023-05-13 ASSESSMENT — ACTIVITIES OF DAILY LIVING (ADL)
ADLS_ACUITY_SCORE: 35
ADLS_ACUITY_SCORE: 35

## 2023-05-14 LAB
ALBUMIN UR-MCNC: 70 MG/DL
ANION GAP SERPL CALCULATED.3IONS-SCNC: 9 MMOL/L (ref 7–15)
APPEARANCE UR: CLEAR
BASE EXCESS BLDV CALC-SCNC: 1.7 MMOL/L (ref -7.7–1.9)
BILIRUB UR QL STRIP: NEGATIVE
BUN SERPL-MCNC: 32.3 MG/DL (ref 8–23)
CALCIUM SERPL-MCNC: 7.9 MG/DL (ref 8.8–10.2)
CHLORIDE SERPL-SCNC: 102 MMOL/L (ref 98–107)
COLOR UR AUTO: ABNORMAL
CREAT SERPL-MCNC: 1.7 MG/DL (ref 0.51–0.95)
DEPRECATED HCO3 PLAS-SCNC: 22 MMOL/L (ref 22–29)
GFR SERPL CREATININE-BSD FRML MDRD: 30 ML/MIN/1.73M2
GLUCOSE BLDC GLUCOMTR-MCNC: 101 MG/DL (ref 70–99)
GLUCOSE BLDC GLUCOMTR-MCNC: 107 MG/DL (ref 70–99)
GLUCOSE BLDC GLUCOMTR-MCNC: 119 MG/DL (ref 70–99)
GLUCOSE BLDC GLUCOMTR-MCNC: 151 MG/DL (ref 70–99)
GLUCOSE BLDC GLUCOMTR-MCNC: 171 MG/DL (ref 70–99)
GLUCOSE BLDC GLUCOMTR-MCNC: 177 MG/DL (ref 70–99)
GLUCOSE BLDC GLUCOMTR-MCNC: 188 MG/DL (ref 70–99)
GLUCOSE BLDC GLUCOMTR-MCNC: 225 MG/DL (ref 70–99)
GLUCOSE BLDC GLUCOMTR-MCNC: 234 MG/DL (ref 70–99)
GLUCOSE BLDC GLUCOMTR-MCNC: 247 MG/DL (ref 70–99)
GLUCOSE BLDC GLUCOMTR-MCNC: 31 MG/DL (ref 70–99)
GLUCOSE BLDC GLUCOMTR-MCNC: 39 MG/DL (ref 70–99)
GLUCOSE BLDC GLUCOMTR-MCNC: 41 MG/DL (ref 70–99)
GLUCOSE SERPL-MCNC: 164 MG/DL (ref 70–99)
GLUCOSE SERPL-MCNC: 202 MG/DL (ref 70–99)
GLUCOSE UR STRIP-MCNC: >=1000 MG/DL
HBA1C MFR BLD: 14.5 %
HCO3 BLDV-SCNC: 27 MMOL/L (ref 21–28)
HGB UR QL STRIP: NEGATIVE
KETONES UR STRIP-MCNC: NEGATIVE MG/DL
LACTATE SERPL-SCNC: 1.1 MMOL/L (ref 0.7–2)
LACTATE SERPL-SCNC: 1.7 MMOL/L (ref 0.7–2)
LACTATE SERPL-SCNC: 2.4 MMOL/L (ref 0.7–2)
LEUKOCYTE ESTERASE UR QL STRIP: NEGATIVE
NITRATE UR QL: NEGATIVE
O2/TOTAL GAS SETTING VFR VENT: 0 %
PCO2 BLDV: 48 MM HG (ref 40–50)
PH BLDV: 7.37 [PH] (ref 7.32–7.43)
PH UR STRIP: 5.5 [PH] (ref 5–7)
PO2 BLDV: 23 MM HG (ref 25–47)
POTASSIUM SERPL-SCNC: 3.5 MMOL/L (ref 3.4–5.3)
RBC URINE: 1 /HPF
SODIUM SERPL-SCNC: 133 MMOL/L (ref 136–145)
SP GR UR STRIP: 1.01 (ref 1–1.03)
UROBILINOGEN UR STRIP-MCNC: NORMAL MG/DL
WBC URINE: 1 /HPF

## 2023-05-14 PROCEDURE — 36415 COLL VENOUS BLD VENIPUNCTURE: CPT | Performed by: INTERNAL MEDICINE

## 2023-05-14 PROCEDURE — 82947 ASSAY GLUCOSE BLOOD QUANT: CPT | Performed by: HOSPITALIST

## 2023-05-14 PROCEDURE — 83605 ASSAY OF LACTIC ACID: CPT | Performed by: INTERNAL MEDICINE

## 2023-05-14 PROCEDURE — 258N000001 HC RX 258: Performed by: INTERNAL MEDICINE

## 2023-05-14 PROCEDURE — 120N000001 HC R&B MED SURG/OB

## 2023-05-14 PROCEDURE — 250N000013 HC RX MED GY IP 250 OP 250 PS 637: Performed by: INTERNAL MEDICINE

## 2023-05-14 PROCEDURE — 258N000003 HC RX IP 258 OP 636: Performed by: INTERNAL MEDICINE

## 2023-05-14 PROCEDURE — 83605 ASSAY OF LACTIC ACID: CPT | Performed by: HOSPITALIST

## 2023-05-14 PROCEDURE — 99232 SBSQ HOSP IP/OBS MODERATE 35: CPT | Performed by: HOSPITALIST

## 2023-05-14 PROCEDURE — 250N000009 HC RX 250: Performed by: INTERNAL MEDICINE

## 2023-05-14 PROCEDURE — 258N000003 HC RX IP 258 OP 636: Performed by: HOSPITALIST

## 2023-05-14 PROCEDURE — 82962 GLUCOSE BLOOD TEST: CPT

## 2023-05-14 PROCEDURE — 82803 BLOOD GASES ANY COMBINATION: CPT | Performed by: INTERNAL MEDICINE

## 2023-05-14 PROCEDURE — 258N000001 HC RX 258: Performed by: HOSPITALIST

## 2023-05-14 PROCEDURE — 82947 ASSAY GLUCOSE BLOOD QUANT: CPT | Performed by: INTERNAL MEDICINE

## 2023-05-14 PROCEDURE — 36415 COLL VENOUS BLD VENIPUNCTURE: CPT | Performed by: HOSPITALIST

## 2023-05-14 PROCEDURE — 81001 URINALYSIS AUTO W/SCOPE: CPT | Performed by: EMERGENCY MEDICINE

## 2023-05-14 RX ORDER — ONDANSETRON 2 MG/ML
4 INJECTION INTRAMUSCULAR; INTRAVENOUS EVERY 6 HOURS PRN
Status: DISCONTINUED | OUTPATIENT
Start: 2023-05-14 | End: 2023-05-20 | Stop reason: HOSPADM

## 2023-05-14 RX ORDER — LIDOCAINE 40 MG/G
CREAM TOPICAL
Status: DISCONTINUED | OUTPATIENT
Start: 2023-05-14 | End: 2023-05-20 | Stop reason: HOSPADM

## 2023-05-14 RX ORDER — PRAVASTATIN SODIUM 20 MG
20 TABLET ORAL DAILY
Status: DISCONTINUED | OUTPATIENT
Start: 2023-05-14 | End: 2023-05-20 | Stop reason: HOSPADM

## 2023-05-14 RX ORDER — HYDRALAZINE HYDROCHLORIDE 20 MG/ML
10 INJECTION INTRAMUSCULAR; INTRAVENOUS EVERY 6 HOURS PRN
Status: DISCONTINUED | OUTPATIENT
Start: 2023-05-14 | End: 2023-05-20 | Stop reason: HOSPADM

## 2023-05-14 RX ORDER — ONDANSETRON 4 MG/1
4 TABLET, ORALLY DISINTEGRATING ORAL EVERY 6 HOURS PRN
Status: DISCONTINUED | OUTPATIENT
Start: 2023-05-14 | End: 2023-05-20 | Stop reason: HOSPADM

## 2023-05-14 RX ORDER — POLYETHYLENE GLYCOL 3350 17 G/17G
17 POWDER, FOR SOLUTION ORAL DAILY PRN
Status: DISCONTINUED | OUTPATIENT
Start: 2023-05-14 | End: 2023-05-20 | Stop reason: HOSPADM

## 2023-05-14 RX ORDER — AMOXICILLIN 250 MG
2 CAPSULE ORAL 2 TIMES DAILY PRN
Status: DISCONTINUED | OUTPATIENT
Start: 2023-05-14 | End: 2023-05-20 | Stop reason: HOSPADM

## 2023-05-14 RX ORDER — NICOTINE POLACRILEX 4 MG
15-30 LOZENGE BUCCAL
Status: DISCONTINUED | OUTPATIENT
Start: 2023-05-14 | End: 2023-05-14

## 2023-05-14 RX ORDER — PROCHLORPERAZINE 25 MG
12.5 SUPPOSITORY, RECTAL RECTAL EVERY 12 HOURS PRN
Status: DISCONTINUED | OUTPATIENT
Start: 2023-05-14 | End: 2023-05-20 | Stop reason: HOSPADM

## 2023-05-14 RX ORDER — NITROGLYCERIN 0.4 MG/1
0.4 TABLET SUBLINGUAL EVERY 5 MIN PRN
Status: DISCONTINUED | OUTPATIENT
Start: 2023-05-14 | End: 2023-05-20 | Stop reason: HOSPADM

## 2023-05-14 RX ORDER — ACETAMINOPHEN 325 MG/1
650 TABLET ORAL EVERY 6 HOURS PRN
Status: DISCONTINUED | OUTPATIENT
Start: 2023-05-14 | End: 2023-05-20 | Stop reason: HOSPADM

## 2023-05-14 RX ORDER — DEXTROSE MONOHYDRATE 25 G/50ML
25-50 INJECTION, SOLUTION INTRAVENOUS
Status: DISCONTINUED | OUTPATIENT
Start: 2023-05-14 | End: 2023-05-14

## 2023-05-14 RX ORDER — PROCHLORPERAZINE MALEATE 5 MG
5 TABLET ORAL EVERY 6 HOURS PRN
Status: DISCONTINUED | OUTPATIENT
Start: 2023-05-14 | End: 2023-05-20 | Stop reason: HOSPADM

## 2023-05-14 RX ORDER — LOSARTAN POTASSIUM 50 MG/1
50 TABLET ORAL DAILY
Status: DISCONTINUED | OUTPATIENT
Start: 2023-05-14 | End: 2023-05-16

## 2023-05-14 RX ORDER — DEXTROSE MONOHYDRATE 25 G/50ML
25-50 INJECTION, SOLUTION INTRAVENOUS
Status: DISCONTINUED | OUTPATIENT
Start: 2023-05-14 | End: 2023-05-20 | Stop reason: HOSPADM

## 2023-05-14 RX ORDER — LIDOCAINE 40 MG/G
CREAM TOPICAL
Status: DISCONTINUED | OUTPATIENT
Start: 2023-05-14 | End: 2023-05-14

## 2023-05-14 RX ORDER — DEXTROSE MONOHYDRATE 100 MG/ML
INJECTION, SOLUTION INTRAVENOUS CONTINUOUS
Status: DISCONTINUED | OUTPATIENT
Start: 2023-05-14 | End: 2023-05-15

## 2023-05-14 RX ORDER — DEXTROSE MONOHYDRATE, SODIUM CHLORIDE, AND POTASSIUM CHLORIDE 50; 1.49; 4.5 G/1000ML; G/1000ML; G/1000ML
INJECTION, SOLUTION INTRAVENOUS CONTINUOUS
Status: DISCONTINUED | OUTPATIENT
Start: 2023-05-14 | End: 2023-05-14

## 2023-05-14 RX ORDER — ACETAMINOPHEN 650 MG/1
650 SUPPOSITORY RECTAL EVERY 6 HOURS PRN
Status: DISCONTINUED | OUTPATIENT
Start: 2023-05-14 | End: 2023-05-20 | Stop reason: HOSPADM

## 2023-05-14 RX ORDER — NICOTINE POLACRILEX 4 MG
15-30 LOZENGE BUCCAL
Status: DISCONTINUED | OUTPATIENT
Start: 2023-05-14 | End: 2023-05-20 | Stop reason: HOSPADM

## 2023-05-14 RX ORDER — FERROUS GLUCONATE 324(37.5)
324 TABLET ORAL 2 TIMES DAILY
COMMUNITY

## 2023-05-14 RX ORDER — BRIMONIDINE TARTRATE AND TIMOLOL MALEATE 2; 5 MG/ML; MG/ML
1 SOLUTION OPHTHALMIC 2 TIMES DAILY
Status: DISCONTINUED | OUTPATIENT
Start: 2023-05-14 | End: 2023-05-20 | Stop reason: HOSPADM

## 2023-05-14 RX ORDER — AMOXICILLIN 250 MG
1 CAPSULE ORAL 2 TIMES DAILY PRN
Status: DISCONTINUED | OUTPATIENT
Start: 2023-05-14 | End: 2023-05-20 | Stop reason: HOSPADM

## 2023-05-14 RX ADMIN — LOSARTAN POTASSIUM 50 MG: 50 TABLET, FILM COATED ORAL at 08:19

## 2023-05-14 RX ADMIN — SODIUM CHLORIDE 1000 ML: 9 INJECTION, SOLUTION INTRAVENOUS at 10:40

## 2023-05-14 RX ADMIN — DEXTROSE MONOHYDRATE 50 ML: 25 INJECTION, SOLUTION INTRAVENOUS at 02:21

## 2023-05-14 RX ADMIN — DEXTROSE MONOHYDRATE 25 ML: 25 INJECTION, SOLUTION INTRAVENOUS at 05:22

## 2023-05-14 RX ADMIN — DEXTROSE MONOHYDRATE: 100 INJECTION, SOLUTION INTRAVENOUS at 05:34

## 2023-05-14 RX ADMIN — BRIMONIDINE TARTRATE, TIMOLOL MALEATE 1 DROP: 2; 5 SOLUTION/ DROPS OPHTHALMIC at 20:31

## 2023-05-14 RX ADMIN — POTASSIUM CHLORIDE, DEXTROSE MONOHYDRATE AND SODIUM CHLORIDE 1000 ML: 150; 5; 450 INJECTION, SOLUTION INTRAVENOUS at 02:23

## 2023-05-14 RX ADMIN — BRIMONIDINE TARTRATE, TIMOLOL MALEATE 1 DROP: 2; 5 SOLUTION/ DROPS OPHTHALMIC at 08:30

## 2023-05-14 RX ADMIN — DEXTROSE AND SODIUM CHLORIDE: 5; 900 INJECTION, SOLUTION INTRAVENOUS at 03:37

## 2023-05-14 RX ADMIN — DEXTROSE MONOHYDRATE: 100 INJECTION, SOLUTION INTRAVENOUS at 16:15

## 2023-05-14 RX ADMIN — NIFEDIPINE 60 MG: 60 TABLET, FILM COATED, EXTENDED RELEASE ORAL at 08:19

## 2023-05-14 RX ADMIN — PRAVASTATIN SODIUM 20 MG: 20 TABLET ORAL at 08:19

## 2023-05-14 ASSESSMENT — ACTIVITIES OF DAILY LIVING (ADL)
VISION_MANAGEMENT: GLASSES
ADLS_ACUITY_SCORE: 41
ADLS_ACUITY_SCORE: 35
ADLS_ACUITY_SCORE: 41
ADLS_ACUITY_SCORE: 39
ADLS_ACUITY_SCORE: 35
ADLS_ACUITY_SCORE: 41
ADLS_ACUITY_SCORE: 41
ADLS_ACUITY_SCORE: 39
COMMUNICATION: OTHER (SEE COMMENTS)
WEAR_GLASSES_OR_BLIND: YES
ADLS_ACUITY_SCORE: 43
ADLS_ACUITY_SCORE: 39
ADLS_ACUITY_SCORE: 41
ADLS_ACUITY_SCORE: 39
DIFFICULTY_COMMUNICATING: YES

## 2023-05-14 NOTE — PLAN OF CARE
Goal Outcome Evaluation:       Summary: severe hypoglycemia & altered mental status. Hx diabetes, dementia, CKD    DATE & TIME: 5/14/23 7643-4106      Cognitive Concerns/ Orientation : A&O x4   BEHAVIOR & AGGRESSION TOOL COLOR: Green  CIWA SCORE: NA   ABNL VS/O2: VSS on RA  MOBILITY: 1 assist GB/W.   PAIN MANAGMENT: denies  DIET: mod carb/carb count  BOWEL/BLADDER: incontinent of urine overnight x1. Ambulated to bathroom x1 & urinated x1.  ABNL LAB/BG:  at 0242. BG 31 - gave 2 apple juices & recheck was 39. Notified MD & gAVE DEXTROSE 25 ML IV per orders. Recheck  & 225.  DRAIN/DEVICES: R. & L. PIV. L. PIV SL. R. PIV infusin D10% at 100ml/hr.  TELEMETRY RHYTHM: NSR  SKIN: WDL  TESTS/PROCEDURES: none  D/C DAY/GOALS/PLACE: TBD  OTHER IMPORTANT INFO: Pt is Japanese speaking. Daughter was with in ED to help to interpret.

## 2023-05-14 NOTE — ED TRIAGE NOTES
EMS REPORT: pts daughter left for work around 0700am, states spoke to pt on phone around 1400. States upon arrival at home found patient UNRESPONSIVE. Called EMS. Upon their  arrival FS glucose was 27, unable to obtain IV access pt given 1mg IM Glucogon injection at 1935. Upon arrival pt has nasal airway with BVM assist respirations. Unresponsive.     Triage Assessment     Row Name 05/13/23 1946       Cognitive/Neuro/Behavioral WDL    Cognitive/Neuro/Behavioral WDL X;level of consciousness    Level of Consciousness unresponsive

## 2023-05-14 NOTE — ED NOTES
[facility]  ED Nurse Handoff Report    ED Chief complaint: Altered Mental Status      ED Diagnosis:   Final diagnoses:   Severe diabetic hypoglycemia (H)   Stupor   Acute respiratory failure with hypercapnia (H)       Code Status: as per hospitalist    Allergies: No Known Allergies    Patient Story: pts daughter left for work around 0700am, states spoke to pt on phone around 1400. States upon arrival at home found patient UNRESPONSIVE. Called EMS. Upon their  arrival FS glucose was 27, unable to obtain IV access pt given 1mg IM Glucogon injection at 1935.     Focused Assessment:  Upon arrival pt has nasal airway with BVM assist respirations. Unresponsive and glucose of 15.   Results for orders placed or performed during the hospital encounter of 05/13/23   Head CT w/o contrast     Status: None (Preliminary result)    Narrative    EXAM: CT HEAD W/O CONTRAST  LOCATION: [facility]  DATE/TIME: 5/13/2023 8:31 PM CDT    INDICATION: AMS, HTN, hypoglycemia.  COMPARISON: Brain MRI 11/04/2022.  TECHNIQUE: Routine CT Head without IV contrast. Multiplanar reformats. Dose reduction techniques were used.    FINDINGS:  INTRACRANIAL CONTENTS: No intracranial hemorrhage, extraaxial collection, or mass effect. 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 Mild presumed chronic small vessel ischemic changes. Mild generalized volume loss. No hydrocephalus.   Calcified plaque distal vertebral arteries and carotid siphons.     VISUALIZED ORBITS/SINUSES/MASTOIDS: No intraorbital abnormality. No paranasal sinus mucosal disease. No middle ear or mastoid effusion.    BONES/SOFT TISSUES: No acute abnormality.      Impression    IMPRESSION:  1.  No CT evidence for acute intracranial process.  2.  Brain atrophy and presumed chronic microvascular ischemic changes as above.   XR Chest Port 1 View     Status: None    Narrative    EXAM: XR CHEST PORT 1 VIEW  LOCATION: [facility]  DATE/TIME: 5/13/2023 8:05 PM CDT    INDICATION: AMS, ? aspiration  COMPARISON: 02/24/2023      Impression    IMPRESSION: Lungs are clear. Heart and pulmonary vascularity are normal. No signs of pneumonia or failure.   Comprehensive metabolic panel     Status: Abnormal   Result Value Ref Range    Sodium 134 (L) 136 - 145 mmol/L    Potassium 4.8 3.4 - 5.3 mmol/L    Chloride 99 98 - 107 mmol/L    Carbon Dioxide (CO2) 25 22 - 29 mmol/L    Anion Gap 10 7 - 15 mmol/L    Urea Nitrogen 39.1 (H) 8.0 - 23.0 mg/dL    Creatinine 1.87 (H) 0.51 - 0.95 mg/dL    Calcium 8.7 (L) 8.8 - 10.2 mg/dL    Glucose 438 (H) 70 - 99 mg/dL    Alkaline Phosphatase 66 35 - 104 U/L    AST 24 10 - 35 U/L    ALT 10 10 - 35 U/L    Protein Total 6.3 (L) 6.4 - 8.3 g/dL    Albumin 3.6 3.5 - 5.2 g/dL    Bilirubin Total <0.2 <=1.2 mg/dL    GFR Estimate 27 (L) >60 mL/min/1.73m2   Phosphorus     Status: Normal   Result Value Ref Range    Phosphorus 4.1 2.5 - 4.5 mg/dL   CBC with platelets and differential     Status: Abnormal   Result Value Ref Range    WBC Count 8.8 4.0 - 11.0 10e3/uL    RBC Count 4.51 3.80 - 5.20 10e6/uL    Hemoglobin 9.0 (L) 11.7 - 15.7 g/dL    Hematocrit 30.0 (L) 35.0 - 47.0 %    MCV 67 (L) 78 - 100 fL    MCH 20.0 (L) 26.5 - 33.0 pg    MCHC 30.0 (L) 31.5 - 36.5 g/dL    RDW 15.3 (H) 10.0 - 15.0 %    Platelet Count 193 150 - 450 10e3/uL    % Neutrophils 79 %    % Lymphocytes 18 %    % Monocytes 3 %    % Eosinophils 0 %    % Basophils 0 %    % Immature Granulocytes 0 %    NRBCs per 100 WBC 0 <1 /100    Absolute Neutrophils 6.8 1.6 - 8.3 10e3/uL    Absolute Lymphocytes 1.6 0.8 - 5.3 10e3/uL    Absolute Monocytes 0.3 0.0 - 1.3 10e3/uL    Absolute Eosinophils 0.0 0.0 - 0.7 10e3/uL    Absolute Basophils 0.0 0.0 - 0.2 10e3/uL    Absolute Immature Granulocytes 0.0 <=0.4 10e3/uL    Absolute NRBCs 0.0 10e3/uL   Glucose by meter     Status: Abnormal   Result Value Ref Range    GLUCOSE BY METER POCT 15 (LL) 70 - 99 mg/dL    [place] Draw     Status: None (In process)    Narrative    The following orders were created for panel order [place] Draw.  Procedure                               Abnormality         Status                     ---------                               -----------         ------                     Extra Blue Top Tube[[id number]]                              Final result               Extra Red Top Tube[[id number]]                               Final result               Extra Blood Bank Purple ...[[id number]]                                                   Please view results for these tests on the individual orders.   Extra Blue Top Tube     Status: None   Result Value Ref Range    Hold Specimen JIC    Extra Red Top Tube     Status: None   Result Value Ref Range    Hold Specimen JIC    CK total     Status: Normal   Result Value Ref Range    CK 26 26 - 192 U/L   Glucose by meter     Status: Abnormal   Result Value Ref Range    GLUCOSE BY METER POCT 510 (HH) 70 - 99 mg/dL   iStat Gases (lactate) venous, POCT     Status: Abnormal   Result Value Ref Range    Lactic Acid POCT 4.9 (HH) <=2.0 mmol/L    Bicarbonate Venous POCT 27 21 - 28 mmol/L    O2 Sat, Venous POCT 38 (L) 94 - 100 %    pCO2V Venous POCT 87 (HH) 40 - 50 mm Hg    pH Venous POCT 7.10 (LL) 7.32 - 7.43    pO2 Venous POCT 31 25 - 47 mm Hg   iStat Gases Electrolytes Venous, POCT     Status: Abnormal   Result Value Ref Range    CPB Applied No     Hematocrit POCT 35 35 - 47 %    Bicarbonate Venous POCT 28 21 - 28 mmol/L    Hemoglobin POCT 11.9 11.7 - 15.7 g/dL    Potassium POCT 6.1 (HH) 3.4 - 5.3 mmol/L    Sodium POCT 136 133 - 144 mmol/L    pCO2 Venous POCT 87 (HH) 40 - 50 mm Hg    pH Venous POCT 7.12 (LL) 7.32 - 7.43    pO2 Venous POCT 31 25 - 47 mm Hg    O2 Sat, Venous POCT 39 (L) 94 - 100 %   Glucose by meter     Status: Abnormal   Result Value Ref Range    GLUCOSE BY METER POCT 344 (H) 70 - 99 mg/dL   Glucose by meter     Status: Abnormal   Result Value Ref  Range    GLUCOSE BY METER POCT 392 (H) 70 - 99 mg/dL   EKG 12 lead     Status: None   Result Value Ref Range    Systolic Blood Pressure  mmHg    Diastolic Blood Pressure  mmHg    Ventricular Rate 103 BPM    Atrial Rate 103 BPM    MA Interval 174 ms    QRS Duration 82 ms     ms    QTc 497 ms    P Axis 61 degrees    R AXIS -53 degrees    T Axis 63 degrees    Interpretation ECG       Sinus tachycardia  Left axis deviation  Anterior infarct , age undetermined  Abnormal ECG  When compared with ECG of 28-FEB-2023 14:34,  Vent. rate has increased BY  37 BPM  ST now depressed in Anterior leads  QT has lengthened  Confirmed by GENERATED REPORT, COMPUTER ([id number]),  Clarence Liu ([id number]) on 5/13/2023 8:38:46 PM     CBC with platelets + differential     Status: Abnormal    Narrative    The following orders were created for panel order CBC with platelets + differential.  Procedure                               Abnormality         Status                     ---------                               -----------         ------                     CBC with platelets and d...[[id number]]  Abnormal            Final result                 Please view results for these tests on the individual orders.       Treatments and/or interventions provided:   Medications   0.9% sodium chloride BOLUS (500 mLs Intravenous $New Bag 5/13/23 2054)   labetalol (NORMODYNE/TRANDATE) injection 20 mg (has no administration in time range)   dextrose 50 % injection 50 mL (50 mLs Intravenous $Given 5/13/23 2001)   dextrose 50 % injection 50 mL (50 mLs Intravenous $Given 5/13/23 1940)   ondansetron (ZOFRAN) injection 4 mg (4 mg Intravenous $Given 5/13/23 2002)     Patient's response to treatments and/or interventions: A&O x3 now, respirations even and unlabored. Skin dry, cold, pale. VS improving, comfortably resting in stretcher.    To be done/followed up on inpatient unit:  continue with POC    Does this patient have any cognitive concerns?:  Baseline dementia, Forgetful, and Marshallese speaking    Activity level - Baseline/Home:  Cane and Walker  Activity Level - Current:   Total Care    Patient's Preferred language: Marshallese   Needed?: No    Isolation: None  Infection: Not Applicable  Patient tested for COVID 19 prior to admission: NO  Bariatric?: No    Vital Signs:   Vitals:    05/13/23 2001 05/13/23 2011 05/13/23 2031 05/13/23 2044   BP: (!) 181/108 (!) 180/115 (!) 192/117 (!) 187/94   Pulse: 103 102 104 95   Resp: 18 16 20 17   Temp:    96.9  F (36.1  C)   TempSrc:    Temporal   SpO2: 100%  99% 100%   Weight:    43.5 kg (96 lb)       Cardiac Rhythm:     Was the PSS-3 completed:   Yes  What interventions are required if any?    Family Comments: daughter and grandson at bedside in ED  OBS brochure/video discussed/provided to patient/family: N/A  For the majority of the shift this patient's behavior was Green.   Behavioral interventions performed were n/a.    ED NURSE PHONE NUMBER: [phone]

## 2023-05-14 NOTE — ED NOTES
Bed: ST  Expected date:   Expected time:   Means of arrival:   Comments:  [id number] [id number] BS 27, deabetic, No IV access

## 2023-05-14 NOTE — PROGRESS NOTES
RECEIVING UNIT ED HANDOFF REVIEW    ED Nurse Handoff Report was reviewed by: Lyric Paniagua RN on May 14, 2023 at 1:58 AM

## 2023-05-14 NOTE — ED PROVIDER NOTES
History     Chief Complaint:  Altered Mental Status       The history is provided by the EMS personnel.      June MYLES Do is a 82 year old female with a history of diabetes, hyperlipidemia and hypertension who presents via EMS with altered mental status. EMS reports patient was last well known at 7 AM today when patients daughter left for work. They report around 6:30 PM patient was found unresponsive in a recliner but was breathing and snoring when daughter came back home. EMS reports patients blood sugar was 27 and says they could not get an IV in. Patient was given 1 mg of Glucagon en route 5 minutes prior to arrival. EMS notes patient was hypertensive in 190's and bradycardic in the 40's.     Independent Historian:   EMS - They report above history.     Review of External Notes: Discharge summary 2/26/2023, admission for elevated blood sugars.    ROS:  Review of Systems  10 point ROS completed, negative except as indicated in the HPI.    Allergies:  Lisinopril   Simvastatin    Medications:    Aspirin   Coreg   Colace  Glucotrol  Cozaar  Januvia  Cholecalciferol   Procardia  Glucophage  Jardicyndy Law     Past Medical History:    Diabetes   Vitamin B12 deficiency   Microcytic hypochromic anemia  Chronic kidney disease  Primary osteoarthritis  Hypertension   Hyperlipidemia     Social History:  The patient presents with family at bedside.  Arrived by EMS.    PCP: Cordelia Clark     Physical Exam     Patient Vitals for the past 24 hrs:   BP Pulse SpO2 Weight   05/13/23 1947 -- -- -- 40.8 kg (90 lb)   05/13/23 1945 (!) 125/99 79 100 % --        Physical Exam  Constitutional: Obtundent, ongoing assisted ventilation with nasal trumpet.  HENT:    Nose: Nose normal.    Mouth/Throat: Normal Koza dry  Eyes: Conjugate gaze, pupils constricted  CV: regular rate and rhythm; no murmurs  Chest: Spontaneously breathing, shallow respirations assist with bag mask  GI:  non tender. No distension. No guarding or rebound.     MSK: No LE edema, no tenderness to palpation of BLE.  Neurological: Stuporous, withdrawing to pain x4  Skin: Skin is warm and dry.      Emergency Department Course     ECG results from 05/13/23   EKG 12 lead     Value    Systolic Blood Pressure     Diastolic Blood Pressure     Ventricular Rate 103    Atrial Rate 103    NE Interval 174    QRS Duration 82        QTc 497    P Axis 61    R AXIS -53    T Axis 63    Interpretation ECG      Sinus tachycardia  Left axis deviation  Anterior infarct , age undetermined  Abnormal ECG  When compared with ECG of 28-FEB-2023 14:34,  Vent. rate has increased BY  37 BPM  ST now depressed in Anterior leads  QT has lengthened  Confirmed by GENERATED REPORT, COMPUTER ([id number]),  Clarence Liu ([id number]) on 5/13/2023 8:38:46 PM           Imaging:  Head CT w/o contrast   Final Result   IMPRESSION:   1.  No CT evidence for acute intracranial process.   2.  Brain atrophy and presumed chronic microvascular ischemic changes as above.      XR Chest Port 1 View   Final Result   IMPRESSION: Lungs are clear. Heart and pulmonary vascularity are normal. No signs of pneumonia or failure.         Report per radiology    Laboratory:  Labs Ordered and Resulted from Time of ED Arrival to Time of ED Departure   COMPREHENSIVE METABOLIC PANEL - Abnormal       Result Value    Sodium 134 (*)     Potassium 4.8      Chloride 99      Carbon Dioxide (CO2) 25      Anion Gap 10      Urea Nitrogen 39.1 (*)     Creatinine 1.87 (*)     Calcium 8.7 (*)     Glucose 438 (*)     Alkaline Phosphatase 66      AST 24      ALT 10      Protein Total 6.3 (*)     Albumin 3.6      Bilirubin Total <0.2      GFR Estimate 27 (*)    CBC WITH PLATELETS AND DIFFERENTIAL - Abnormal    WBC Count 8.8      RBC Count 4.51      Hemoglobin 9.0 (*)     Hematocrit 30.0 (*)     MCV 67 (*)     MCH 20.0 (*)     MCHC 30.0 (*)     RDW 15.3 (*)     Platelet Count 193      % Neutrophils 79      % Lymphocytes 18      % Monocytes 3      %  Eosinophils 0      % Basophils 0      % Immature Granulocytes 0      NRBCs per 100 WBC 0      Absolute Neutrophils 6.8      Absolute Lymphocytes 1.6      Absolute Monocytes 0.3      Absolute Eosinophils 0.0      Absolute Basophils 0.0      Absolute Immature Granulocytes 0.0      Absolute NRBCs 0.0     GLUCOSE BY METER - Abnormal    GLUCOSE BY METER POCT 15 (*)    GLUCOSE BY METER - Abnormal    GLUCOSE BY METER POCT 510 (*)    ISTAT GASES LACTATE VENOUS POCT - Abnormal    Lactic Acid POCT 4.9 (*)     Bicarbonate Venous POCT 27      O2 Sat, Venous POCT 38 (*)     pCO2V Venous POCT 87 (*)     pH Venous POCT 7.10 (*)     pO2 Venous POCT 31     ISTAT GASES ELECTROLYTES VENOUS POCT - Abnormal    CPB Applied No      Hematocrit POCT 35      Bicarbonate Venous POCT 28      Hemoglobin POCT 11.9      Potassium POCT 6.1 (*)     Sodium POCT 136      pCO2 Venous POCT 87 (*)     pH Venous POCT 7.12 (*)     pO2 Venous POCT 31      O2 Sat, Venous POCT 39 (*)    GLUCOSE BY METER - Abnormal    GLUCOSE BY METER POCT 344 (*)    PROCALCITONIN - Abnormal    Procalcitonin 0.05 (*)    GLUCOSE BY METER - Abnormal    GLUCOSE BY METER POCT 392 (*)    GLUCOSE BY METER - Abnormal    GLUCOSE BY METER POCT 327 (*)    ISTAT GASES LACTATE VENOUS POCT - Abnormal    Lactic Acid POCT 6.4 (*)     Bicarbonate Venous POCT 20 (*)     O2 Sat, Venous POCT 46 (*)     pCO2V Venous POCT 50      pH Venous POCT 7.20 (*)     pO2 Venous POCT 31     GLUCOSE BY METER - Abnormal    GLUCOSE BY METER POCT 311 (*)    PHOSPHORUS - Normal    Phosphorus 4.1     CK TOTAL - Normal    CK 26     GLUCOSE MONITOR NURSING POCT   ROUTINE UA WITH MICROSCOPIC   ISTAT CHEM 7 POCT   BLOOD CULTURE   BLOOD CULTURE          Emergency Department Course & Assessments:    Interventions:  Medications   labetalol (NORMODYNE/TRANDATE) injection 20 mg (has no administration in time range)   dextrose 50 % injection 50 mL (50 mLs Intravenous $Given 5/13/23 2001)   dextrose 50 % injection 50 mL (50  mLs Intravenous $Given 23)   ondansetron (ZOFRAN) injection 4 mg (4 mg Intravenous $Given 23)   0.9% sodium chloride BOLUS (500 mLs Intravenous $New Bag 23)        Assessments:   I obtained history and examined the patient as noted above.     Independent Interpretation (X-rays, CTs, rhythm strip):  I personally reviewed her portable chest x-ray in real-time, clear chest by my read  I personally reviewed her head CT, see no evidence of large    Consultations/Discussion of Management or Tests:   I spoke with Dr. Don of the hospitalist team regarding the patient, who accepted the patient for admission.          Social Determinants of Health affecting care:   Memory impairment sometimes resulting in accidental    Disposition:  The patient was admitted to the hospital under the care of Dr. Don.     Impression & Plan    Medical Decision Makin-year-old Arabic speaking woman past medical history seen for hypertension, diabetes on insulin, concern for potential dementia without formal diagnosis presenting after she was found sitting in her chair unresponsive but spontaneously breathing and blood sugar here on arrival of 15.  Paramedics got a blood sugar of 27 they did give 1 mg of IM glucagon, this was approximately 5 minutes prior to arrival, they were unable to establish an IV.  On arrival to the emergency department patient had assisted ventilation with bag mask however was spontaneously breathing was hypertensive and mildly tachycardic but stuporous with blood sugar as above.  We were rapidly able to establish an IV and she was given 2 A of D50 with recheck of blood sugar 50 minutes later at 500.  Her mental status did slowly and improve over the course of the next 45 minutes consistent with likely hypoglycorrhachia in the setting of profound hypoglycemia.  In discussion with the family they last heard from her approximately 4 hours  prior to finding her when they called  her over the phone, she got her diabetes medication per usual this morning from her daughter at approximately 7 AM.  No reported recent infectious symptoms, patient herself denies headache, chest pain, chest pressure or shortness of breath after she cleared.  Her labs I think are consistent with severe hypoglycemia, she was acidemic with hypercarbia on arrival with an elevated lactate however with correction of her sugar, her acidemia and bicarb improved.  Her lactate remained elevated however I do not suspect sepsis, she is afebrile here without leukocytosis and normal Pro-Iban.  I suspect this is akin to type B elevation secondary to severe hypoglycemia.  Discussed with the hospitalist, we will avoid antibiotics however will plan for [facility] admission for close monitoring.  Serial blood sugars every 15 and now every 30 have been stable.  Approximately 1 hour into the case and reports she is at her mental baseline.  CT imaging of her head was negative, there is no reports of trauma.    Critical Care time:  was 75 family is at bedside minutes for this patient excluding procedures.    Diagnosis:    ICD-10-CM    1. Severe diabetic hypoglycemia (H)  E11.649       2. Stupor  R40.1       3. Acute respiratory failure with hypercapnia (H)  J96.02            Geremias Glez MD  [employer]  10:18 PM 05/13/23       Scribe Disclosure:  I, PRINCESS REINA, am serving as a scribe at 7:42 PM on 5/13/2023 to document services personally performed by Geremias Glez MD based on my observations and the provider's statements to me.     5/13/2023   Geremias Glez MD Dunbar, John Forrest, MD  05/13/23 [id number]

## 2023-05-14 NOTE — PROGRESS NOTES
[facility]    Medicine Progress Note - Hospitalist Service    Date of Admission:  5/13/2023    Assessment & Plan   June MYLES Do is a 82 year old female admitted on 5/13/2023.  Past DM2; HTN; HLD; CKD; dementia; ACD; and glaucoma; who presents with AMS and found with profound hypoglycemia with elevated lactate.     On initial evaluation, patient was afebrile, hypertensive up to the 190s/110s, tachycardic.  EKG showed sinus tachycardia without acute ischemic changes.  Labs notable for glucose level of 15; BMP showed sodium 134, creatinine 1.87, calcium 8.7; CBC showed white count normal, hemoglobin 9.0, platelets normal; VBG showed pH of 7.12, PCO2 87.  Chest x-ray was negative for acute findings.  Head CT was negative for acute findings.        Profound hypoglycemia.  Lactic acidosis, suspect related to above as well as metformin with CKD.  Metabolic encephalopathy due to above.  DM2 with retinopathy.  * Hgb A1C 11.1 3/2023.  Up to 14.5% this admission  * per PharmD med rec: Januvia and glipizide discontinued 3/21/23 and initiated on Jardiance.  Also on lantus 15 units daily, metformin 1000 mg bid  * Initial presentation as above. In the ER, patient was given IV fluids, IV dextrose with increased in the patient's glucose level to the 500s.  Patient's mental status subsequently improved significantly.  Patient's family reported that she had returned to near baseline.    * Of note is that the patient is on glargine which could greatly increase her risk of hypoglycemic episodes; at the same time, A1c is markedly elevated suggesting she may have some lability in her glucose control.     - glucose 188 on late morning check, decrease D5W to 50 ml/hr (was at 100 ml/hr overnight)  - POC glucose q4h  - PRN hypoglycemia protocol.  - Hold prior to admission glargine, metformin, empagliflozin for now.  - Will need to re-introduce PTA meds gradually. Possibly stop glipizide (given concomitant glargine).  Also possibly stop metformin given CKD, lactic acidosis.  - aspart ISS (low resistance).  - lactate 2.4 this AM, bolusing 1L IVF and repeat check in 2 hours; UA and CXR at admission were unremarkable and remains afebrile - suspect due to hypovolemia as hypoglycemia not currently issue  - spoke with grandFrankie poe over phone 05/14/23; he reports his mother noted that patient did not eat her lunch yesterday which may be the cause for her profound hyperglycemia.  His mother had spoken to his grandmother via phone about 1pm and she sounded normal.  She is alone for a part of the day and is responsible for eating the lunch prepared by family, and per his report, she has generally been eating this with the exception of yesterday.  He reports all meds are locked away and there is no chance that patient could have taken anything on her own     HTN (benign essential).  - Hold PTA carvedilol for now given recent profound hypoglycemia.  - blood pressures down to 90's systolic after receiving losartan and nicardipine this AM, reporting lightheadedness  - bolus 1L NS, adjusted hold parameters for medication     CKD3.   * Baseline cr appears to be mostly in mid to upper 1 range.  * Cr 1.87 on admit.  - Continue to monitor BMP.  - Avoid nephrotoxic medications.     HLD.  - Continue PTA lovastatin (pravastatin subbed)     Dementia, unclear severity.  - Re-orient as needed.  - Maintain normal day/night, sleep/wake cycles.  - Minimize sedating medications as able.     Chronic anemia  * Hgb 9.0 with follow-up hgb normal; possibly hemoconcentrated. No overt clinical signs of major bleeding.  * Per chart review, baseline hgb about 9 g/dL  - Continue outpatient darbepoetin.       Diet: Moderate Consistent Carb (60 g CHO per Meal) Diet    DVT Prophylaxis: Pneumatic Compression Devices  Sotelo Catheter: Not present  Lines: None     Cardiac Monitoring: ACTIVE order. Indication: See H&P and/or Progress Notes  Code Status: Full Code       Clinically Significant Risk Factors Present on Admission        # Hyperkalemia: Highest K = 6.1 mmol/L in last 2 days, will monitor as appropriate         # Drug Induced Platelet Defect: home medication list includes an antiplatelet medication   # Hypertension: Home medication list includes antihypertensive(s)   # Acute Respiratory Failure: based on blood gas results.  Continue supplemental oxygen as needed    # DMII: A1C = 14.5 % (Ref range: <5.7 %) within past 6 months             Disposition Plan      Expected Discharge Date: 05/16/2023                  Roberto Olivares MD  Hospitalist Service  [facility]  Securely message with linkedÃ¼ (more info)  Text page via [employer] Paging/Directory   ______________________________________________________________________    Interval History   She denies any fever/chills, dyspnea, nausea, diarrhea, UTI symptoms.  States she is feeling at baseline.  Offers no other complaints.  No recollection of what happened to bring her to the hospital.     Physical Exam   Vital Signs: Temp: 98.5  F (36.9  C) Temp src: Oral BP: (!) 94/39 Pulse: 59   Resp: 16 SpO2: 95 % O2 Device: None (Room air) Oxygen Delivery: 7 LPM  Weight: 93 lbs 14.4 oz    General Appearance: Well nourished female in NAD  Respiratory: respirations non-labored on room air air, no tachypnea   Cardiovascular:   GI: abdomen soft, nondistended, nontender  Skin: no edema   Other: Awake and alert, cranial nerves grossly intact, seems to have some memory impairment     Medical Decision Making       35 MINUTES SPENT BY ME on the date of service doing chart review, history, exam, documentation & further activities per the note.  MANAGEMENT DISCUSSED with the following over the past 24 hours: bedside RN, patient's grandson   Tests ORDERED & REVIEWED in the past 24 hours:  - BMP  - lactate, glucose checks      Data     I have personally reviewed the following data over the past 24 hrs:    8.8  \   11.9   /  193     136 99 39.1 (H) /  188 (H)   6.1 (HH) 25 1.87 (H) \       ALT: 10 AST: 24 AP: 66 TBILI: <0.2   ALB: 3.6 TOT PROTEIN: 6.3 (L) LIPASE: N/A       TSH: N/A T4: N/A A1C: 14.5 (H)       Procal: 0.05 (H) CRP: N/A Lactic Acid: 2.4 (H)

## 2023-05-14 NOTE — PHARMACY-ADMISSION MEDICATION HISTORY
Pharmacy Intern Admission Medication History    Admission medication history is complete. The information provided in this note is only as accurate as the sources available at the time of the update.    Medication reconciliation/reorder completed by provider prior to medication history? Yes    Information Source(s): Family member and CareEverywhere/SureScripts via phone    Pertinent Information: Spoke with patient's daughter. She states patient took her am medications yesterday morning. Additionally, Januvia and glipizide XL were discontinued on 3/21 and Jardiance was started by Dr. Clark.     Changes made to PTA medication list:    Added: Jardiance    Deleted: Januvia, glipizide Xl     Changed: None    Medication Affordability: Not including over the counter (OTC) medications, was there a time in the past 3 months when you did not take your medications as prescribed because of cost? No        Allergies reviewed with patient and updates made in EHR: yes    Medication History Completed By: Alyssa Herndon 5/14/2023 8:55 AM    Prior to Admission medications    Medication Sig Last Dose Taking? Auth Provider Long Term End Date   aspirin (ASA) 81 MG chewable tablet Take 81 mg by mouth daily 5/13/2023 at am Yes Unknown, Entered By History     brimonidine-timolol (COMBIGAN) 0.2-0.5 % ophthalmic solution Place 1 drop into both eyes 2 times daily 5/13/2023 at am Yes Unknown, Entered By History     carvedilol (COREG) 6.25 MG tablet Take 6.25 mg by mouth 2 times daily (with meals) 5/13/2023 at am Yes Unknown, Entered By History Yes    darbepoetin claudette (ARANESP, ALBUMIN FREE,) 60 MCG/0.3ML injection Inject 60 mcg Subcutaneous every 14 days More than a month at 4/11 Yes Unknown, Entered By History     docusate sodium (COLACE) 100 MG capsule Take 100 mg by mouth 2 times daily 5/13/2023 at am Yes Unknown, Entered By History     empagliflozin (JARDIANCE) 10 MG TABS tablet Take 10 mg by mouth daily 5/13/2023 at am Yes Unknown,  Entered By History     Ferrous Gluconate 324 (37.5 Fe) MG TABS Take 324 mg by mouth daily 5/13/2023 at am Yes Unknown, Entered By History     insulin glargine (LANTUS PEN) 100 UNIT/ML pen Inject 15 Units Subcutaneous every morning 5/13/2023 at am Yes Unknown, Entered By History     levocetirizine (XYZAL) 5 MG tablet Take 5 mg by mouth every evening Past Week at 5/12 pm Yes Unknown, Entered By History     losartan (COZAAR) 50 MG tablet Take 50 mg by mouth daily 5/13/2023 at am Yes Unknown, Entered By History Yes    lovastatin (MEVACOR) 20 MG tablet Take 20 mg by mouth At Bedtime Past Week at 5/12 pm Yes Unknown, Entered By History Yes    metFORMIN (GLUCOPHAGE) 1000 MG tablet Take 1,000 mg by mouth 2 times daily (with meals) 5/13/2023 at am Yes Unknown, Entered By History Yes    NIFEdipine ER OSMOTIC (PROCARDIA XL) 60 MG 24 hr tablet Take 60 mg by mouth daily 5/13/2023 at am Yes Unknown, Entered By History Yes    vitamin D3 (CHOLECALCIFEROL) 50 mcg (2000 units) tablet Take 1 tablet by mouth daily 5/13/2023 at am Yes Unknown, Entered By History

## 2023-05-14 NOTE — H&P
INTERNAL MEDICINE HISTORY AND PHYSICAL  [facility]      uJne MYLES Do [MR#: [id number]  : 1940  82 year old female]  Date of Admission:  2023  Primary Care Provider:  Cordelia Clark      Chief Complaint:   Altered mental status.    History of Present Illness:   Ms. June Shultz is an 83 yo female with history including DM2; HTN; HLD; CKD; dementia; ACD; and glaucoma; who presents with AMS.  Please note that the history is obtained from speaking with the ER doctor, Dr. Glez and from review of the electronic medical record as there is no  available to me at this time and the patient's daughter is not here and not responding to phone call.    Patient was known well at 7 AM when the patient's daughter left for work.  However when the patient got home at around 6:30 PM patient was in a recliner and was breathing and snoring, but was unresponsive. Paramedics were called and when they arrived her glucose level was 27.  She was hypertensive in the 190s and bradycardic in the 40s.  They had difficulty getting an IV in.  She was given glucagon in route 5 minutes prior to arrival to the ER.  Subsequently brought to [facility] for further evaluation.    Patient was seen in the ER by Dr. Glez.  On initial evaluation, patient was afebrile, hypertensive up to the 190s/110s, tachycardic.  EKG showed sinus tachycardia without acute ischemic changes.  Labs notable for glucose level of 15; BMP showed sodium 134, creatinine 1.87, calcium 8.7; CBC showed white count normal, hemoglobin 9.0, platelets normal; VBG showed pH of 7.12, PCO2 87.  Chest x-ray was negative for acute findings.  Head CT was negative for acute findings.    In the ER, patient was given IV fluids, IV dextrose with increased in the patient's glucose level to the 500s.  Patient's mental status subsequently improved significantly.  Patient's family who are here said that she had returned to near  baseline.    Per report, not change in diet or oral intake. Not clear there was recent medication changes.    Past Medical History:   1. DM2 with retinopathy. Hgb A1C 11.1 3/2023.  2. HTN.  3. HLD.  4. CKD3. Baseline cr appears to be mostly in mid to upper 1 range.  5. Vit B12 deficiency.  6. Dementia.  7. OA.  8. Glaucoma.  9. Anemia of chronic disease.    From Care Everywhere:  Problem Noted Date   Vitamin B12 deficiency 03/27/2023   Microcytic hypochromic anemia 03/27/2023   Urinary incontinence 03/22/2023   Type 2 diabetes mellitus with complication 03/21/2023   Overview:     Formatting of this note might be different from the original.  Non proliferative retinopathy; chronic kidney disease   Memory loss 03/21/2023   Stage 3 chronic kidney disease 06/19/2022   Suspected glaucoma of both eyes 05/08/2022   Primary osteoarthritis involving multiple joints 05/08/2022   Benign neoplasm of colon 10/28/2005   Overview:     Formatting of this note might be different from the original.  LW Modifier: overdue for colonoscopy; pt. refuses  LW Onset: 2000  ; Polyp Colon Adenomatous   Hyperlipidemia 06/07/2003   Essential hypertension 06/07/2003   Overview:     Formatting of this note might be different from the original.  Hypertension     Past Medical History:   Diagnosis Date     Diabetes (H)      Above past medical history reviewed and edited and/or added to as necessary.    Past Surgical History:   None noted.  History reviewed. No pertinent surgical history.     Allergies:   No Known Allergies     Medications:     Prior to Admission Medications   Prescriptions Last Dose Informant Patient Reported? Taking?   NIFEdipine ER OSMOTIC (PROCARDIA XL) 60 MG 24 hr tablet   Yes No   Sig: Take 60 mg by mouth daily   aspirin (ASA) 81 MG chewable tablet   Yes No   Sig: Take 81 mg by mouth daily   brimonidine-timolol (COMBIGAN) 0.2-0.5 % ophthalmic solution   Yes No   Sig: Place 1 drop into both eyes 2 times daily   carvedilol (COREG)  6.25 MG tablet   Yes No   Sig: Take 6.25 mg by mouth 2 times daily (with meals)   darbepoetin claudette (ARANESP, ALBUMIN FREE,) 60 MCG/0.3ML injection   Yes No   Sig: Inject 60 mcg Subcutaneous every 14 days   docusate sodium (COLACE) 100 MG capsule   Yes No   Sig: Take 100 mg by mouth 2 times daily   glipiZIDE (GLUCOTROL XL) 10 MG 24 hr tablet   Yes No   Sig: Take 10 mg by mouth 2 times daily   insulin glargine (LANTUS PEN) 100 UNIT/ML pen   Yes No   Sig: Inject 15 Units Subcutaneous every morning   levocetirizine (XYZAL) 5 MG tablet   Yes No   Sig: Take 5 mg by mouth every evening   losartan (COZAAR) 50 MG tablet   Yes No   Sig: Take 50 mg by mouth daily   lovastatin (MEVACOR) 20 MG tablet   Yes No   Sig: Take 20 mg by mouth At Bedtime   metFORMIN (GLUCOPHAGE) 1000 MG tablet   Yes No   Sig: Take 1,000 mg by mouth 2 times daily (with meals)   sitagliptin (JANUVIA) 100 MG tablet   Yes No   Sig: Take 100 mg by mouth daily   vitamin D3 (CHOLECALCIFEROL) 50 mcg (2000 units) tablet   Yes No   Sig: Take 1 tablet by mouth daily      Facility-Administered Medications: None       Social History:   . Has at least 1 daughter. Does not smoke or drink. Is from [place].  Social History     Socioeconomic History     Marital status:      Spouse name: Not on file     Number of children: Not on file     Years of education: Not on file     Highest education level: Not on file   Occupational History     Not on file   Tobacco Use     Smoking status: Never     Smokeless tobacco: Never   Vaping Use     Vaping status: Not on file   Substance and Sexual Activity     Alcohol use: Not Currently     Drug use: Not Currently     Sexual activity: Not on file   Other Topics Concern     Not on file   Social History Narrative     Not on file     Social Determinants of Health     Financial Resource Strain: Not on file   Food Insecurity: Not on file   Transportation Needs: Not on file   Physical Activity: Not on file   Stress: Not on file    Social Connections: Not on file   Intimate Partner Violence: Not on file   Housing Stability: Not on file       Family History:   Reviewed.  History reviewed. No pertinent family history.    Review of Systems:   As noted in the HPI; otherwise 10 point review of systems was negative.     Physical Exam:   VITALS:  Blood pressure (!) 187/94, pulse 95, temperature 96.9  F (36.1  C), temperature source Temporal, resp. rate 17, weight 43.5 kg (96 lb), SpO2 100 %.  General: Awake, alert.  HEENT: Pupils likely round and reactive, no scleral icterus or conjunctival injection.  Neck: No bruits, JVD or adenopathy.  Heart/Chest: Regular rate and rhythm without murmurs, rubs or gallops.  Lungs: Diminished in the bases, no crackles or wheezes.  Abdomen: Soft, nondistended, nontender, possible sounds.  Extremities/Musculoskeletal: Trace leg edema.  Skin:    Neurologic:       Labs, Imaging & Other Data:     Results for orders placed or performed during the hospital encounter of 05/13/23   XR Chest Port 1 View     Status: None    Narrative    EXAM: XR CHEST PORT 1 VIEW  LOCATION: [facility]  DATE/TIME: 5/13/2023 8:05 PM CDT    INDICATION: AMS, ? aspiration  COMPARISON: 02/24/2023      Impression    IMPRESSION: Lungs are clear. Heart and pulmonary vascularity are normal. No signs of pneumonia or failure.   Comprehensive metabolic panel     Status: Abnormal   Result Value Ref Range    Sodium 134 (L) 136 - 145 mmol/L    Potassium 4.8 3.4 - 5.3 mmol/L    Chloride 99 98 - 107 mmol/L    Carbon Dioxide (CO2) 25 22 - 29 mmol/L    Anion Gap 10 7 - 15 mmol/L    Urea Nitrogen 39.1 (H) 8.0 - 23.0 mg/dL    Creatinine 1.87 (H) 0.51 - 0.95 mg/dL    Calcium 8.7 (L) 8.8 - 10.2 mg/dL    Glucose 438 (H) 70 - 99 mg/dL    Alkaline Phosphatase 66 35 - 104 U/L    AST 24 10 - 35 U/L    ALT 10 10 - 35 U/L    Protein Total 6.3 (L) 6.4 - 8.3 g/dL    Albumin 3.6 3.5 - 5.2 g/dL    Bilirubin Total <0.2 <=1.2 mg/dL    GFR Estimate 27  (L) >60 mL/min/1.73m2   Phosphorus     Status: Normal   Result Value Ref Range    Phosphorus 4.1 2.5 - 4.5 mg/dL   CBC with platelets and differential     Status: Abnormal   Result Value Ref Range    WBC Count 8.8 4.0 - 11.0 10e3/uL    RBC Count 4.51 3.80 - 5.20 10e6/uL    Hemoglobin 9.0 (L) 11.7 - 15.7 g/dL    Hematocrit 30.0 (L) 35.0 - 47.0 %    MCV 67 (L) 78 - 100 fL    MCH 20.0 (L) 26.5 - 33.0 pg    MCHC 30.0 (L) 31.5 - 36.5 g/dL    RDW 15.3 (H) 10.0 - 15.0 %    Platelet Count 193 150 - 450 10e3/uL    % Neutrophils 79 %    % Lymphocytes 18 %    % Monocytes 3 %    % Eosinophils 0 %    % Basophils 0 %    % Immature Granulocytes 0 %    NRBCs per 100 WBC 0 <1 /100    Absolute Neutrophils 6.8 1.6 - 8.3 10e3/uL    Absolute Lymphocytes 1.6 0.8 - 5.3 10e3/uL    Absolute Monocytes 0.3 0.0 - 1.3 10e3/uL    Absolute Eosinophils 0.0 0.0 - 0.7 10e3/uL    Absolute Basophils 0.0 0.0 - 0.2 10e3/uL    Absolute Immature Granulocytes 0.0 <=0.4 10e3/uL    Absolute NRBCs 0.0 10e3/uL   Glucose by meter     Status: Abnormal   Result Value Ref Range    GLUCOSE BY METER POCT 15 (LL) 70 - 99 mg/dL   [place] Draw     Status: None (In process)    Narrative    The following orders were created for panel order [place] Draw.  Procedure                               Abnormality         Status                     ---------                               -----------         ------                     Extra Blue Top Tube[[id number]]                              In process                 Extra Red Top Tube[[id number]]                               In process                 Extra Blood Bank Purple ...[[id number]]                                                   Please view results for these tests on the individual orders.   CK total     Status: Normal   Result Value Ref Range    CK 26 26 - 192 U/L   Glucose by meter     Status: Abnormal   Result Value Ref Range    GLUCOSE BY METER POCT 510 (HH) 70 - 99 mg/dL   iStat Gases (lactate) venous, POCT      Status: Abnormal   Result Value Ref Range    Lactic Acid POCT 4.9 (HH) <=2.0 mmol/L    Bicarbonate Venous POCT 27 21 - 28 mmol/L    O2 Sat, Venous POCT 38 (L) 94 - 100 %    pCO2V Venous POCT 87 (HH) 40 - 50 mm Hg    pH Venous POCT 7.10 (LL) 7.32 - 7.43    pO2 Venous POCT 31 25 - 47 mm Hg   iStat Gases Electrolytes Venous, POCT     Status: Abnormal   Result Value Ref Range    CPB Applied No     Hematocrit POCT 35 35 - 47 %    Bicarbonate Venous POCT 28 21 - 28 mmol/L    Hemoglobin POCT 11.9 11.7 - 15.7 g/dL    Potassium POCT 6.1 (HH) 3.4 - 5.3 mmol/L    Sodium POCT 136 133 - 144 mmol/L    pCO2 Venous POCT 87 (HH) 40 - 50 mm Hg    pH Venous POCT 7.12 (LL) 7.32 - 7.43    pO2 Venous POCT 31 25 - 47 mm Hg    O2 Sat, Venous POCT 39 (L) 94 - 100 %   Glucose by meter     Status: Abnormal   Result Value Ref Range    GLUCOSE BY METER POCT 344 (H) 70 - 99 mg/dL   Glucose by meter     Status: Abnormal   Result Value Ref Range    GLUCOSE BY METER POCT 392 (H) 70 - 99 mg/dL   EKG 12 lead     Status: None   Result Value Ref Range    Systolic Blood Pressure  mmHg    Diastolic Blood Pressure  mmHg    Ventricular Rate 103 BPM    Atrial Rate 103 BPM    CO Interval 174 ms    QRS Duration 82 ms     ms    QTc 497 ms    P Axis 61 degrees    R AXIS -53 degrees    T Axis 63 degrees    Interpretation ECG       Sinus tachycardia  Left axis deviation  Anterior infarct , age undetermined  Abnormal ECG  When compared with ECG of 28-FEB-2023 14:34,  Vent. rate has increased BY  37 BPM  ST now depressed in Anterior leads  QT has lengthened  Confirmed by GENERATED REPORT, COMPUTER ([id number]),  Clarence Liu ([id number]) on 5/13/2023 8:38:46 PM     CBC with platelets + differential     Status: Abnormal    Narrative    The following orders were created for panel order CBC with platelets + differential.  Procedure                               Abnormality         Status                     ---------                               -----------          ------                     CBC with platelets and d...[[id number]]  Abnormal            Final result                 Please view results for these tests on the individual orders.     ECG which I personally reviewed, as described above.        ASSESSMENT & PLAN:   Ms. June Shultz is an 83 yo female with history including DM2; HTN; HLD; CKD; dementia; ACD; and glaucoma; who presents with AMS and found with profound hypoglycemia with elevated lactate.    On initial evaluation, patient was afebrile, hypertensive up to the 190s/110s, tachycardic.  EKG showed sinus tachycardia without acute ischemic changes.  Labs notable for glucose level of 15; BMP showed sodium 134, creatinine 1.87, calcium 8.7; CBC showed white count normal, hemoglobin 9.0, platelets normal; VBG showed pH of 7.12, PCO2 87.  Chest x-ray was negative for acute findings.  Head CT was negative for acute findings.      Profound hypoglycemia.  Lactic acidosis, suspect related to above as well as metformin with CKD.  Metabolic encephalopathy due to above.  DM2 with retinopathy.  * Hgb A1C 11.1 3/2023.  * Initial presentation as above. In the ER, patient was given IV fluids, IV dextrose with increased in the patient's glucose level to the 500s.  Patient's mental status subsequently improved significantly.  Patient's family who are here said that she had returned to near baseline.    * Of note is that the patient is on glipizide and glargine which could greatly increase her risk of hypoglycemic episodes; at the same time, A1c was 11.1 in 3/2023 suggesting she may have some lability in her glucose control.   - Admit to inpatient ([facility] [place]  - Order D5 NS at 50 ml/hr.  - Order PRN hypoglycemia protocol.  - Hold prior to admission glipizide, glargine, metformin, and sitagliptin for now.  - Will need to re-introduce PTA meds gradually. Possibly stop glipizide (given concomitant glargine). Also possibly stop metformin given CKD, lactic acidosis.  - Order aspart ISS  (low resistance).  - Repeat lactate in am.    HTN (benign essential).  - Continue PTA losartan and nicardipine.  - Hold PTA carvedilol for now given recent profound hypoglycemia.  - Order PRN IV hydralazine.    CKD3.   * Baseline cr appears to be mostly in mid to upper 1 range.  * Cr 1.87 on admit.  - Continue to monitor BMP.  - Avoid nephrotoxic medications.    HLD.  - Continue PTA lovastatin.    Dementia, unclear severity.  - Re-orient as needed.  - Maintain normal day/night, sleep/wake cycles.  - Minimize sedating medications as able.    Anemia, suspect chronic component.  * Hgb 9.0 with follow-up hgb normal; possibly hemoconcentrated. No overt clinical signs of major bleeding.  - Continue to monitor CBC   - Continue outpatient darbepoetin.    Prophylaxis.  - PCD's, ambulation..    CODE STATUS: FULL      Randolph Don Jr., MD  [phone] (p)  Text Page  Kenji

## 2023-05-14 NOTE — PROVIDER NOTIFICATION
MD Notification    Notified Person: MD    Notified Person Name: Edgar    Notification Date/Time: 0520    Notification Interaction: message    Purpose of Notification: Hypoglycemic BG 31. Gave pt. 2 apple juices. Rechecked BG 39. Gave 25ml dextrose IV.     Orders Received: d/c'd dextrose infusion 5%, replaced with dextrose 10% at 100ml/hr    Comments:

## 2023-05-14 NOTE — PLAN OF CARE
Summary: Hypoglycemia   Date & Time: 7607-5182 5/14  Orientation: A&Ox4  Activity Level: Assist of 1 GB/W  Fall Risk: Yes  Behavior & Aggression: Green  Pain Management: Denies  ABNL VS/O2: VSS on RA ex BP low (gave 250 ml/hr over 4 hours)  ABNL Lab/BG: , 188; lactic acid 2.4  Diet: mod carb/carb count   Bowel/Bladder: Incontinent   Drains/Devices: R PIV infusing D10% 50ml/hr  Skin: Intact ex, slight blanchable redness on buttocks/coccyx  Anticipated  DC Date: Pending  Other Important Info: Yoruba speaking

## 2023-05-15 LAB
ANION GAP SERPL CALCULATED.3IONS-SCNC: 12 MMOL/L (ref 7–15)
BASOPHILS # BLD AUTO: 0.1 10E3/UL (ref 0–0.2)
BASOPHILS NFR BLD AUTO: 1 %
BUN SERPL-MCNC: 29.3 MG/DL (ref 8–23)
CALCIUM SERPL-MCNC: 8.3 MG/DL (ref 8.8–10.2)
CHLORIDE SERPL-SCNC: 106 MMOL/L (ref 98–107)
CREAT SERPL-MCNC: 1.77 MG/DL (ref 0.51–0.95)
DEPRECATED HCO3 PLAS-SCNC: 21 MMOL/L (ref 22–29)
EOSINOPHIL # BLD AUTO: 0.1 10E3/UL (ref 0–0.7)
EOSINOPHIL NFR BLD AUTO: 2 %
ERYTHROCYTE [DISTWIDTH] IN BLOOD BY AUTOMATED COUNT: 15.2 % (ref 10–15)
GFR SERPL CREATININE-BSD FRML MDRD: 28 ML/MIN/1.73M2
GLUCOSE BLDC GLUCOMTR-MCNC: 109 MG/DL (ref 70–99)
GLUCOSE BLDC GLUCOMTR-MCNC: 152 MG/DL (ref 70–99)
GLUCOSE BLDC GLUCOMTR-MCNC: 167 MG/DL (ref 70–99)
GLUCOSE BLDC GLUCOMTR-MCNC: 210 MG/DL (ref 70–99)
GLUCOSE BLDC GLUCOMTR-MCNC: 295 MG/DL (ref 70–99)
GLUCOSE BLDC GLUCOMTR-MCNC: 311 MG/DL (ref 70–99)
GLUCOSE BLDC GLUCOMTR-MCNC: 312 MG/DL (ref 70–99)
GLUCOSE BLDC GLUCOMTR-MCNC: 419 MG/DL (ref 70–99)
GLUCOSE SERPL-MCNC: 278 MG/DL (ref 70–99)
HCT VFR BLD AUTO: 25.7 % (ref 35–47)
HGB BLD-MCNC: 7.8 G/DL (ref 11.7–15.7)
IMM GRANULOCYTES # BLD: 0 10E3/UL
IMM GRANULOCYTES NFR BLD: 0 %
IRON BINDING CAPACITY (ROCHE): 219 UG/DL (ref 240–430)
IRON SATN MFR SERPL: 24 % (ref 15–46)
IRON SERPL-MCNC: 53 UG/DL (ref 37–145)
LYMPHOCYTES # BLD AUTO: 1.8 10E3/UL (ref 0.8–5.3)
LYMPHOCYTES NFR BLD AUTO: 24 %
MCH RBC QN AUTO: 19.8 PG (ref 26.5–33)
MCHC RBC AUTO-ENTMCNC: 30.4 G/DL (ref 31.5–36.5)
MCV RBC AUTO: 65 FL (ref 78–100)
MONOCYTES # BLD AUTO: 0.6 10E3/UL (ref 0–1.3)
MONOCYTES NFR BLD AUTO: 8 %
NEUTROPHILS # BLD AUTO: 4.8 10E3/UL (ref 1.6–8.3)
NEUTROPHILS NFR BLD AUTO: 65 %
NRBC # BLD AUTO: 0 10E3/UL
NRBC BLD AUTO-RTO: 0 /100
PLATELET # BLD AUTO: 194 10E3/UL (ref 150–450)
POTASSIUM SERPL-SCNC: 4.1 MMOL/L (ref 3.4–5.3)
RBC # BLD AUTO: 3.93 10E6/UL (ref 3.8–5.2)
SODIUM SERPL-SCNC: 139 MMOL/L (ref 136–145)
WBC # BLD AUTO: 7.5 10E3/UL (ref 4–11)

## 2023-05-15 PROCEDURE — 250N000013 HC RX MED GY IP 250 OP 250 PS 637: Performed by: HOSPITALIST

## 2023-05-15 PROCEDURE — 120N000001 HC R&B MED SURG/OB

## 2023-05-15 PROCEDURE — 82310 ASSAY OF CALCIUM: CPT | Performed by: INTERNAL MEDICINE

## 2023-05-15 PROCEDURE — 36415 COLL VENOUS BLD VENIPUNCTURE: CPT | Performed by: INTERNAL MEDICINE

## 2023-05-15 PROCEDURE — 250N000013 HC RX MED GY IP 250 OP 250 PS 637: Performed by: INTERNAL MEDICINE

## 2023-05-15 PROCEDURE — 83550 IRON BINDING TEST: CPT | Performed by: HOSPITALIST

## 2023-05-15 PROCEDURE — 85025 COMPLETE CBC W/AUTO DIFF WBC: CPT | Performed by: INTERNAL MEDICINE

## 2023-05-15 PROCEDURE — 84466 ASSAY OF TRANSFERRIN: CPT | Performed by: HOSPITALIST

## 2023-05-15 PROCEDURE — 99232 SBSQ HOSP IP/OBS MODERATE 35: CPT | Performed by: HOSPITALIST

## 2023-05-15 RX ADMIN — LOSARTAN POTASSIUM 50 MG: 50 TABLET, FILM COATED ORAL at 10:04

## 2023-05-15 RX ADMIN — NIFEDIPINE 60 MG: 60 TABLET, FILM COATED, EXTENDED RELEASE ORAL at 10:04

## 2023-05-15 RX ADMIN — BRIMONIDINE TARTRATE, TIMOLOL MALEATE 1 DROP: 2; 5 SOLUTION/ DROPS OPHTHALMIC at 21:38

## 2023-05-15 RX ADMIN — BRIMONIDINE TARTRATE, TIMOLOL MALEATE 1 DROP: 2; 5 SOLUTION/ DROPS OPHTHALMIC at 10:05

## 2023-05-15 RX ADMIN — PRAVASTATIN SODIUM 20 MG: 20 TABLET ORAL at 10:04

## 2023-05-15 RX ADMIN — ACETAMINOPHEN 650 MG: 325 TABLET ORAL at 14:18

## 2023-05-15 ASSESSMENT — ACTIVITIES OF DAILY LIVING (ADL)
ADLS_ACUITY_SCORE: 43
ADLS_ACUITY_SCORE: 45
ADLS_ACUITY_SCORE: 42
ADLS_ACUITY_SCORE: 42
ADLS_ACUITY_SCORE: 45
ADLS_ACUITY_SCORE: 42
ADLS_ACUITY_SCORE: 45
ADLS_ACUITY_SCORE: 42
ADLS_ACUITY_SCORE: 43
ADLS_ACUITY_SCORE: 42

## 2023-05-15 NOTE — PROGRESS NOTES
[facility]    Medicine Progress Note - Hospitalist Service    Date of Admission:  5/13/2023    Assessment & Plan   June MYLES Do is a 82 year old female admitted on 5/13/2023.  Past DM2; HTN; HLD; CKD; dementia; ACD; and glaucoma; who presents with AMS and found with profound hypoglycemia with elevated lactate.     On initial evaluation, patient was afebrile, hypertensive up to the 190s/110s, tachycardic.  EKG showed sinus tachycardia without acute ischemic changes.  Labs notable for glucose level of 15; BMP showed sodium 134, creatinine 1.87, calcium 8.7; CBC showed white count normal, hemoglobin 9.0, platelets normal; VBG showed pH of 7.12, PCO2 87.  Chest x-ray was negative for acute findings.  Head CT was negative for acute findings.        Profound hypoglycemia.  Lactic acidosis, suspect related to above as well as metformin with CKD.  Metabolic encephalopathy due to above.  DM2 with retinopathy.  * Hgb A1C 11.1 3/2023.  Up to 14.5% this admission  * per PharmD med rec: Januvia and glipizide discontinued 3/21/23 and initiated on Jardiance.  Also on lantus 15 units daily, metformin 1000 mg bid  * Initial presentation as above. In the ER, patient was given IV fluids, IV dextrose with increased in the patient's glucose level to the 500s.  Patient's mental status subsequently improved significantly.  Patient's family reported that she had returned to near baseline.    * Of note is that the patient is on glargine which could greatly increase her risk of hypoglycemic episodes; at the same time, A1c is markedly elevated suggesting she may have some lability in her glucose control.   * per family report 5/14, she had not eaten lunch the day she presented; dose not have access to her medications and these are managed by her daughter so less likely due to medication error    - glucose 152 this AM on D5W overnight; will discontinue  - start medium ssi as taking PO; possibly introduce low dose lantus  tomorrow pending glucose trend  - POC glucose with meals and bedtime  - PRN hypoglycemia protocol.  - continue to hold prior to admission glargine, metformin, empagliflozin for now.  - Will need to re-introduce PTA meds gradually. Also possibly stop metformin given CKD, lactic acidosis.    ADDENDUM:  Glucose >300 this afternoon.  Increase ssi to high dose and start prandial insulin 1u/10g carb. Anticipate need for Lantus in AM.      HTN (benign essential).  * soft pressures with associated lightheadedness 5/14, improved with 1L fluid bolus  - Hold PTA carvedilol as currently normotensive  - continue PTA losartan and nicardipine with hold parameters     CKD3.   * Baseline cr appears to be mostly in mid to upper 1 range.  * Cr 1.87 on admit.  - Cr stable at 1.7 05/15/23   - Avoid nephrotoxic medications.     HLD.  - Continue PTA lovastatin (pravastatin subbed)     Dementia, unclear severity.  - Re-orient as needed.  - Maintain normal day/night, sleep/wake cycles.  - Minimize sedating medications as able.     Chronic anemia  * Hgb 9.0 with follow-up hgb normal; possibly hemoconcentrated. No overt clinical signs of major bleeding.  * Per chart review, baseline hgb about 9 g/dL  - Continue outpatient darbepoetin.  - marked microcytosis; will check iron studies       Diet: Moderate Consistent Carb (60 g CHO per Meal) Diet    DVT Prophylaxis: Pneumatic Compression Devices  Sotelo Catheter: Not present  Lines: None     Cardiac Monitoring: None  Code Status: Full Code      Clinically Significant Risk Factors        # Hyperkalemia: Highest K = 6.1 mmol/L in last 2 days, will monitor as appropriate   # Hypocalcemia: Lowest Ca = 7.9 mg/dL in last 2 days, will monitor and replace as appropriate               # DMII: A1C = 14.5 % (Ref range: <5.7 %) within past 6 months, PRESENT ON ADMISSION           Disposition Plan      Expected Discharge Date: 05/18/2023                  Roberto Olivares MD  Hospitalist Service  [facility]  [facility]  Securely message with Kenji (more info)  Text page via [facility] Paging/Directory   ______________________________________________________________________    Interval History   She reports feeling well.  Again denies any infectious symptoms.  Denies lightheadedness.  Reports appetite is good (confirmed by RN).  Grandson present at time of visit and reports all confusion resolved and she is at baseline mental status.     Physical Exam   Vital Signs: Temp: 98.8  F (37.1  C) Temp src: Oral BP: 111/51 Pulse: 74   Resp: 18 SpO2: 97 % O2 Device: None (Room air)    Weight: 93 lbs 14.4 oz    General Appearance: Well nourished female in NAD  Respiratory: respirations non-labored on room air air, no tachypnea   Cardiovascular:   GI:   Skin: no edema   Other: Awake and alert, cranial nerves grossly intact, responding appropriately to questions    Medical Decision Making       30 MINUTES SPENT BY ME on the date of service doing chart review, history, exam, documentation & further activities per the note.  MANAGEMENT DISCUSSED with the following over the past 24 hours: patient's grandson, bedside nurse   Tests ORDERED & REVIEWED in the past 24 hours:  - BMP  - serial glucose  Medical complexity over the past 24 hours:  - Prescription DRUG MANAGEMENT performed      Data     I have personally reviewed the following data over the past 24 hrs:    7.5  \   7.8 (L)   / 194     139 106 29.3 (H) /  295 (H)   4.1 21 (L) 1.77 (H) \       Procal: N/A CRP: N/A Lactic Acid: 1.1

## 2023-05-15 NOTE — PROVIDER NOTIFICATION
MD Notification    Notified Person: MD    Notified Person Name: Dr. Olivares    Notification Date/Time: 5/15/23, 1421    Notification Interaction: Kenji     Purpose of Notification: Hi, 623, BG was 419, I gave her 6 units, Ill recheck BG in 30 min, and update you       Orders Received:    Comments:

## 2023-05-15 NOTE — PLAN OF CARE
Goal Outcome Evaluation:  Summary: severe hypoglycemia & altered mental status. Hx diabetes, dementia, CKD  DATE & TIME: 5/15/23 7335-2182  Cognitive Concerns/ Orientation : A&O x3, disoriented to time, Italian speaking, patients daughter or grandson usually present at bedside and can help interpret during day.   BEHAVIOR & AGGRESSION TOOL COLOR: Green  ABNL VS/O2: VSS on RA, except hypotensive at times.   MOBILITY: Ax1 GBW. Ambulated in [place] today x1.   PAIN MANAGMENT: denies  DIET: Mod carb/carb count  BOWEL/BLADDER: Incontinent of urine, no BM today   ABNL LAB/BG: Cr 1.77, BG checks q4hrs,  , 295, 419, 312, 167 Hgb 7.8 no signs of bleeding.  DRAIN/DEVICES: L. PIV SL;  IV fluids discontinued  TELEMETRY RHYTHM: discontinued  SKIN: WDL  TESTS/PROCEDURES: None  D/C DAY/GOALS/PLACE: Once BG under control  OTHER IMPORTANT INFO: Pt is Italian speaking. Understands some English.

## 2023-05-15 NOTE — PLAN OF CARE
Goal Outcome Evaluation:         Summary: severe hypoglycemia & altered mental status. Hx diabetes, dementia, CKD  DATE & TIME: 5/14/23-5/15/23 4502-9544  Cognitive Concerns/ Orientation : A&O x4   BEHAVIOR & AGGRESSION TOOL COLOR: Green  ABNL VS/O2: VSS on RA, except hypotensive at times.   MOBILITY: Ax1 GB&W.   PAIN MANAGMENT: denies  DIET: Mod carb/carb count  BOWEL/BLADDER: Incontinent of urine x1   ABNL LAB/BG: BG checks q4hrs,  , 152  DRAIN/DEVICES: L. PIV SL;  R. PIV infusing D10% at 100ml/hr.  TELEMETRY RHYTHM: NSR,CTM  SKIN: WDL  TESTS/PROCEDURES: n/a  D/C DAY/GOALS/PLACE: D  OTHER IMPORTANT INFO: Pt is Polish speaking.

## 2023-05-15 NOTE — PLAN OF CARE
Summary: severe hypoglycemia & altered mental status. Hx diabetes, dementia, CKD  DATE & TIME: 5/14/23 2892-7126  Cognitive Concerns/ Orientation : A&O x4   BEHAVIOR & AGGRESSION TOOL COLOR: Green  ABNL VS/O2: VSS on RA, except hypotensive at times.   MOBILITY: Ax1 GB&W.   PAIN MANAGMENT: denies  DIET: Mod carb/carb count  BOWEL/BLADDER: Incontinent at times  ABNL LAB/BG: BG checks q4hrs,  , 177   DRAIN/DEVICES: L. PIV SL;  R. PIV infusing D10% at 100ml/hr.  TELEMETRY RHYTHM: SB  SKIN: WDL  TESTS/PROCEDURES: n/a  D/C DAY/GOALS/PLACE: TBD  OTHER IMPORTANT INFO: Pt is Greek speaking. Daughter was with in room during the start of shift to help to interpret.

## 2023-05-16 LAB
GLUCOSE BLDC GLUCOMTR-MCNC: 236 MG/DL (ref 70–99)
GLUCOSE BLDC GLUCOMTR-MCNC: 262 MG/DL (ref 70–99)
GLUCOSE BLDC GLUCOMTR-MCNC: 278 MG/DL (ref 70–99)
GLUCOSE BLDC GLUCOMTR-MCNC: 324 MG/DL (ref 70–99)
GLUCOSE BLDC GLUCOMTR-MCNC: 83 MG/DL (ref 70–99)
TRANSFERRIN SERPL-MCNC: 180 MG/DL (ref 200–360)

## 2023-05-16 PROCEDURE — 250N000013 HC RX MED GY IP 250 OP 250 PS 637: Performed by: HOSPITALIST

## 2023-05-16 PROCEDURE — 120N000001 HC R&B MED SURG/OB

## 2023-05-16 PROCEDURE — 250N000012 HC RX MED GY IP 250 OP 636 PS 637: Performed by: HOSPITALIST

## 2023-05-16 PROCEDURE — 99232 SBSQ HOSP IP/OBS MODERATE 35: CPT | Performed by: HOSPITALIST

## 2023-05-16 PROCEDURE — 250N000013 HC RX MED GY IP 250 OP 250 PS 637: Performed by: INTERNAL MEDICINE

## 2023-05-16 RX ORDER — NIFEDIPINE 30 MG/1
30 TABLET, EXTENDED RELEASE ORAL DAILY
Status: DISCONTINUED | OUTPATIENT
Start: 2023-05-17 | End: 2023-05-17

## 2023-05-16 RX ORDER — LOSARTAN POTASSIUM 25 MG/1
25 TABLET ORAL DAILY
Status: DISCONTINUED | OUTPATIENT
Start: 2023-05-17 | End: 2023-05-18

## 2023-05-16 RX ADMIN — BRIMONIDINE TARTRATE, TIMOLOL MALEATE 1 DROP: 2; 5 SOLUTION/ DROPS OPHTHALMIC at 08:53

## 2023-05-16 RX ADMIN — LOSARTAN POTASSIUM 50 MG: 50 TABLET, FILM COATED ORAL at 08:54

## 2023-05-16 RX ADMIN — PRAVASTATIN SODIUM 20 MG: 20 TABLET ORAL at 08:54

## 2023-05-16 RX ADMIN — SENNOSIDES AND DOCUSATE SODIUM 1 TABLET: 50; 8.6 TABLET ORAL at 10:49

## 2023-05-16 RX ADMIN — NIFEDIPINE 60 MG: 60 TABLET, FILM COATED, EXTENDED RELEASE ORAL at 08:54

## 2023-05-16 RX ADMIN — BRIMONIDINE TARTRATE, TIMOLOL MALEATE 1 DROP: 2; 5 SOLUTION/ DROPS OPHTHALMIC at 21:10

## 2023-05-16 RX ADMIN — INSULIN GLARGINE 7 UNITS: 100 INJECTION, SOLUTION SUBCUTANEOUS at 11:08

## 2023-05-16 ASSESSMENT — ACTIVITIES OF DAILY LIVING (ADL)
ADLS_ACUITY_SCORE: 45
ADLS_ACUITY_SCORE: 45
ADLS_ACUITY_SCORE: 32
FALL_HISTORY_WITHIN_LAST_SIX_MONTHS: NO
ADLS_ACUITY_SCORE: 32
WALKING_OR_CLIMBING_STAIRS_DIFFICULTY: NO
CHANGE_IN_FUNCTIONAL_STATUS_SINCE_ONSET_OF_CURRENT_ILLNESS/INJURY: NO
ADLS_ACUITY_SCORE: 45
ADLS_ACUITY_SCORE: 32
DEPENDENT_IADLS:: CLEANING;COOKING;LAUNDRY;SHOPPING;MEAL PREPARATION;MEDICATION MANAGEMENT;MONEY MANAGEMENT;TRANSPORTATION
CONCENTRATING,_REMEMBERING_OR_MAKING_DECISIONS_DIFFICULTY: NO
ADLS_ACUITY_SCORE: 32
DOING_ERRANDS_INDEPENDENTLY_DIFFICULTY: NO
WEAR_GLASSES_OR_BLIND: NO
TOILETING_ISSUES: NO
ADLS_ACUITY_SCORE: 45
DIFFICULTY_EATING/SWALLOWING: NO
ADLS_ACUITY_SCORE: 32
ADLS_ACUITY_SCORE: 45
ADLS_ACUITY_SCORE: 32
ADLS_ACUITY_SCORE: 32
DRESSING/BATHING_DIFFICULTY: NO

## 2023-05-16 NOTE — CONSULTS
Care Management Initial Consult    General Information  Assessment completed with: Children, Daughter Gloria  Type of CM/SW Visit: Initial Assessment    Primary Care Provider verified and updated as needed: Yes   Readmission within the last 30 days: no previous admission in last 30 days      Reason for Consult: discharge planning  Advance Care Planning: Advance Care Planning Reviewed: no concerns identified     General Information Comments: High readmission risk    Communication Assessment  Patient's communication style: spoken language (non-English)    Hearing Difficulty or Deaf: no   Wear Glasses or Blind: no    Cognitive  Cognitive/Neuro/Behavioral: WDL  Level of Consciousness: alert  Arousal Level: opens eyes spontaneously  Orientation: oriented x 4  Mood/Behavior: calm, cooperative  Best Language: 0 - No aphasia  Speech:  (Surinamese speaking)    Living Environment:   People in home: child(thalia), adult  Daughter Gloria  Current living Arrangements: apartment      Able to return to prior arrangements: other (see comments) (await PT eval)  Living Arrangement Comments: Daughter states patient is not interested in anything and usually remains in one chair all day    Family/Social Support:  Care provided by: self, child(thalia)  Provides care for: no one, unable/limited ability to care for self  Marital Status:   Children          Description of Support System: Involved    Support Assessment: Lacks necessary supervision and assistance, Limited social contact and support, Minimal outside structure and leisure time activities, Difficulty establishing and maintaining relationships    Current Resources:   Patient receiving home care services: No     Community Resources:    Equipment currently used at home: walker, standard  Supplies currently used at home:      Employment/Financial:  Employment Status: retired     Employment/ Comments: worked in [employer] many years ago  Financial Concerns: No concerns  identified           Does the patient's insurance plan have a 3 day qualifying hospital stay waiver?  No    Lifestyle & Psychosocial Needs:  Social Determinants of Health     Tobacco Use: Low Risk  (2023)    Patient History      Smoking Tobacco Use: Never      Smokeless Tobacco Use: Never      Passive Exposure: Not on file   Alcohol Use: Not on file   Financial Resource Strain: Not on file   Food Insecurity: Not on file   Transportation Needs: Not on file   Physical Activity: Not on file   Stress: Not on file   Social Connections: Not on file   Intimate Partner Violence: Not on file   Depression: Not on file   Housing Stability: Not on file       Functional Status:  Prior to admission patient needed assistance:   Dependent ADLs:: Ambulation-cane, Bathing  Dependent IADLs:: Cleaning, Cooking, Laundry, Shopping, Meal Preparation, Medication Management, Money Management, Transportation       Mental Health Status:  Mental Health Status: Current Concern  Mental Health Management:  (Patient is not interested in anything per daughter's report)    Chemical Dependency Status:  Chemical Dependency Status: No Current Concerns             Values/Beliefs:  Spiritual, Cultural Beliefs, Druze Practices, Values that affect care:   NA              Additional Information:  Patient was napping.  Patient with reported history of some dementia, decision was made to call her Daughter Gloria for this Care Management consult.  Role of Care Management was explained to patient's daughter.  Patient has a history of  DM2; HTN; HLD; CKD; dementia; ACD; and glaucoma.  Patient had lived in [place] with her spouse until he  1.5 years ago.  She then came to live with her daughter.  Unfortunately, Gloria has to work.  She leaves around 6:30 AM and is gone for 10 hours.  Patient is home alone.  Gloria noted her mother is not interested in anything.  She basically sits in the same chair all day long and gets up to the bathroom or eats  something, but typically just sits there without TV or radio.  She has poor vision, so this may contribute to this.  Natalie has taken her to adult day programs with the [facility] community, but patient is not interested in this and does not like to associate with anyone.  Question if it may be a layer of depression of losing her spouse and possibly past  Poor life experiences that often were the case with the [facility] population.  Patient uses a cane at baseline.  She is able to dress herself and her daughter assist with bathing as needed.  Gloria gives her insulin at breakfast and then noted sometimes in the evening.  Patient cannot manage any of her medications.  PT eval is pending.  Gloria is off work Thursday thru Sunday this week.  The only other family member in [place] is Gloria's son that lives in [facility].  Gloria wants to schedule follow-up,appointments, as she will need to coordinate with her schedule.    Care Management will continue to follow and be available to help with any discharge needs patient may have.    Marie Astudillo, RN   Inpatient Care Management  [phone]

## 2023-05-16 NOTE — PLAN OF CARE
Summary: severe hypoglycemia & altered mental status. Hx diabetes, dementia, CKD  DATE & TIME:  5/16/2023 6684-6898  Cognitive Concerns/ Orientation : A&O x4, pt daughter or grandson can interpret during day otherwise using Jabber  BEHAVIOR & AGGRESSION TOOL COLOR: Green  ABNL VS/O2: VSS on RA  MOBILITY: Ax1 GB&Walker. Ambulated and up to chair, generalized weakness and complained of some dizziness. Orthos negative. Pt is at home alone most of day when daughter is at work. PT/OT consulted  PAIN MANAGMENT: denies  DIET: Mod carb/carb count, tolerating; family did bring food this AM after pt had breakfast and RN did not know. Was unable to calculate carbs and did not get BGM prior. Explained to family it is hard for us to monitor BGM's with outside food and they verbalized understanding  BOWEL/BLADDER: Incontinent of urine at times, given senna x1 for difficulty having a BM. Did have a small formed brown stool  ABNL LAB/BG: , 324, 278- lantus started  DRAIN/DEVICES: PIV SL x2   TELEMETRY RHYTHM: NA  SKIN: WDL  TESTS/PROCEDURES: n/a  D/C DAY/GOALS/PLACE: pending stable BGMs  OTHER IMPORTANT INFO: Pt is Telugu speaking. Speaks basic English.

## 2023-05-16 NOTE — PROGRESS NOTES
[facility]    Medicine Progress Note - Hospitalist Service    Date of Admission:  5/13/2023    Assessment & Plan   June MYLES Do is a 82 year old female admitted on 5/13/2023.  Past DM2; HTN; HLD; CKD; dementia; ACD; and glaucoma; who presents with AMS and found with profound hypoglycemia with elevated lactate.     On initial evaluation, patient was afebrile, hypertensive up to the 190s/110s, tachycardic.  EKG showed sinus tachycardia without acute ischemic changes.  Labs notable for glucose level of 15; BMP showed sodium 134, creatinine 1.87, calcium 8.7; CBC showed white count normal, hemoglobin 9.0, platelets normal; VBG showed pH of 7.12, PCO2 87.  Chest x-ray was negative for acute findings.  Head CT was negative for acute findings.        Profound hypoglycemia, resolved  Lactic acidosis, suspect related to above as well as metformin with CKD.  Metabolic encephalopathy due to above.  DM2 with retinopathy.  * Hgb A1C 11.1 3/2023.  Up to 14.5% this admission  * per PharmD med rec: Januvia and glipizide discontinued 3/21/23 and initiated on Jardiance.  Also on lantus 15 units daily, metformin 1000 mg bid  * Initial presentation as above. In the ER, patient was given IV fluids, IV dextrose with increased in the patient's glucose level to the 500s.  Patient's mental status subsequently improved significantly.  Patient's family reported that she had returned to near baseline.    * Of note is that the patient is on glargine which could greatly increase her risk of hypoglycemic episodes; at the same time, A1c is markedly elevated suggesting she may have some lability in her glucose control.   * per family report 5/14, she had not eaten lunch the day she presented; dose not have access to her medications and these are managed by her daughter so less likely due to medication error  * D5W discontinued morning of 5/15    - glucose elevated this AM, start lantus 7 units daily  - continue high dose ssi  and prandial insulin 1u/10g carb  - PRN hypoglycemia protocol.  - continue to hold prior to admission metformin, empagliflozin   - Will need to re-introduce PTA meds gradually. Also possibly stop metformin given CKD, lactic acidosis.  - reports lightheadedness, will check orthostatic blood pressures - may require additional IVF, currently at risk for osmolar diuresis with hyperglycemia; BP management as below  - encourage PO fluids  - PT eval as feels unsteady on her feet     HTN (benign essential).  * soft pressures with associated lightheadedness 5/14, improved with 1L fluid bolus  - Hold PTA carvedilol as currently normotensive  - reduce losartan to 25 mg daily, reduce nifedipine to 30 mg daily starting 5/17/23     CKD3.   * Baseline cr appears to be mostly in mid to upper 1 range.  * Cr 1.87 on admit.  - Cr stable at 1.7 05/15/23; repeat tomorrow  - Avoid nephrotoxic medications.     HLD.  - Continue PTA lovastatin (pravastatin subbed)     Dementia, unclear severity.  - Re-orient as needed.  - Maintain normal day/night, sleep/wake cycles  - Minimize sedating medications as able     Chronic anemia  * Hgb 9.0 with follow-up hgb normal; possibly hemoconcentrated. No overt clinical signs of major bleeding  * Per chart review, baseline hgb about 9 g/dL  * iron studies negative for deficiency  - Continue outpatient darbepoetin    Constipation  - prn senna, colace, miralax       Diet: Moderate Consistent Carb (60 g CHO per Meal) Diet    DVT Prophylaxis: Pneumatic Compression Devices  Sotelo Catheter: Not present  Lines: None     Cardiac Monitoring: None  Code Status: Full Code      Clinically Significant Risk Factors                        # DMII: A1C = 14.5 % (Ref range: <5.7 %) within past 6 months, PRESENT ON ADMISSION           Disposition Plan      Expected Discharge Date: 05/17/2023                  Roberto Olivares MD  Hospitalist Service  [facility]  Securely message with Vocera (more  info)  Text page via [facility] Paging/Directory   ______________________________________________________________________    Interval History   Interviewed with professional Yi  via phone.  Reports lightheadedness when sitting up.  Denies any chest pain/pressure, palpitations.  Does not feel secure on her feet due to this and worried about being at home alone when she discharges.  Denies any infectious symptoms. No other complaints    Physical Exam   Vital Signs: Temp: 98.4  F (36.9  C) Temp src: Oral BP: 108/52 Pulse: 65   Resp: 16 SpO2: 95 % O2 Device: None (Room air)    Weight: 89 lbs 8 oz    General Appearance: Well nourished female in NAD  Respiratory: lungs CTAB, no wheezes or crackles  Cardiovascular: RRR, normal s1/s2 without murmur  GI: abdomen soft, normal bowel sounds  Skin: no edema   Other: Awake and alert, cranial nerves grossly intact, responding appropriately to questions    Medical Decision Making       35 MINUTES SPENT BY ME on the date of service doing chart review, history, exam, documentation & further activities per the note.  MANAGEMENT DISCUSSED with the following over the past 24 hours: bedside RN, patient's daughter   Medical complexity over the past 24 hours:  - Prescription DRUG MANAGEMENT performed      Data

## 2023-05-16 NOTE — PLAN OF CARE
Summary: severe hypoglycemia & altered mental status. Hx diabetes, dementia, CKD  DATE & TIME: 5/15/23 NOC  Cognitive Concerns/ Orientation : A&O x4, pt daughter or grandson can interpret during day.   BEHAVIOR & AGGRESSION TOOL COLOR: Green  ABNL VS/O2: VSS on RA, except hypotensive at times.  MOBILITY: Ax1 GB&W. Up to bathroom x3  PAIN MANAGMENT: denies  DIET: Mod carb/carb count  BOWEL/BLADDER: Incontinent of urine, no BM this shift  ABNL LAB/BG: Cr 1.77, BG checks q4hrs,  , 236 Hgb 7.8  DRAIN/DEVICES: RL. PIV SL;  IV fluids discontinued  TELEMETRY RHYTHM: discontinued  SKIN: WDL  TESTS/PROCEDURES: n/a  D/C DAY/GOALS/PLACE: TBD  OTHER IMPORTANT INFO: Pt is Latvian speaking. Speaks basic Eng

## 2023-05-17 ENCOUNTER — APPOINTMENT (OUTPATIENT)
Dept: PHYSICAL THERAPY | Facility: CLINIC | Age: 83
DRG: 637 | End: 2023-05-17
Attending: HOSPITALIST
Payer: COMMERCIAL

## 2023-05-17 LAB
ANION GAP SERPL CALCULATED.3IONS-SCNC: 11 MMOL/L (ref 7–15)
BUN SERPL-MCNC: 32.6 MG/DL (ref 8–23)
CALCIUM SERPL-MCNC: 8.7 MG/DL (ref 8.8–10.2)
CHLORIDE SERPL-SCNC: 105 MMOL/L (ref 98–107)
CREAT SERPL-MCNC: 1.64 MG/DL (ref 0.51–0.95)
DEPRECATED HCO3 PLAS-SCNC: 22 MMOL/L (ref 22–29)
GFR SERPL CREATININE-BSD FRML MDRD: 31 ML/MIN/1.73M2
GLUCOSE BLDC GLUCOMTR-MCNC: 145 MG/DL (ref 70–99)
GLUCOSE BLDC GLUCOMTR-MCNC: 200 MG/DL (ref 70–99)
GLUCOSE BLDC GLUCOMTR-MCNC: 206 MG/DL (ref 70–99)
GLUCOSE BLDC GLUCOMTR-MCNC: 271 MG/DL (ref 70–99)
GLUCOSE BLDC GLUCOMTR-MCNC: 307 MG/DL (ref 70–99)
GLUCOSE BLDC GLUCOMTR-MCNC: 341 MG/DL (ref 70–99)
GLUCOSE BLDC GLUCOMTR-MCNC: 95 MG/DL (ref 70–99)
GLUCOSE SERPL-MCNC: 291 MG/DL (ref 70–99)
POTASSIUM SERPL-SCNC: 4.3 MMOL/L (ref 3.4–5.3)
SODIUM SERPL-SCNC: 138 MMOL/L (ref 136–145)

## 2023-05-17 PROCEDURE — 97116 GAIT TRAINING THERAPY: CPT | Mod: GP | Performed by: PHYSICAL THERAPIST

## 2023-05-17 PROCEDURE — 250N000013 HC RX MED GY IP 250 OP 250 PS 637: Performed by: INTERNAL MEDICINE

## 2023-05-17 PROCEDURE — 120N000001 HC R&B MED SURG/OB

## 2023-05-17 PROCEDURE — 36415 COLL VENOUS BLD VENIPUNCTURE: CPT | Performed by: HOSPITALIST

## 2023-05-17 PROCEDURE — 99232 SBSQ HOSP IP/OBS MODERATE 35: CPT | Performed by: HOSPITALIST

## 2023-05-17 PROCEDURE — 250N000013 HC RX MED GY IP 250 OP 250 PS 637: Performed by: HOSPITALIST

## 2023-05-17 PROCEDURE — 82310 ASSAY OF CALCIUM: CPT | Performed by: HOSPITALIST

## 2023-05-17 PROCEDURE — 97530 THERAPEUTIC ACTIVITIES: CPT | Mod: GP | Performed by: PHYSICAL THERAPIST

## 2023-05-17 PROCEDURE — 97161 PT EVAL LOW COMPLEX 20 MIN: CPT | Mod: GP | Performed by: PHYSICAL THERAPIST

## 2023-05-17 RX ADMIN — LOSARTAN POTASSIUM 25 MG: 25 TABLET, FILM COATED ORAL at 08:27

## 2023-05-17 RX ADMIN — DEXTROSE 15 G: 15 GEL ORAL at 01:26

## 2023-05-17 RX ADMIN — BRIMONIDINE TARTRATE, TIMOLOL MALEATE 1 DROP: 2; 5 SOLUTION/ DROPS OPHTHALMIC at 20:50

## 2023-05-17 RX ADMIN — SENNOSIDES AND DOCUSATE SODIUM 1 TABLET: 50; 8.6 TABLET ORAL at 08:27

## 2023-05-17 RX ADMIN — PRAVASTATIN SODIUM 20 MG: 20 TABLET ORAL at 08:27

## 2023-05-17 RX ADMIN — BRIMONIDINE TARTRATE, TIMOLOL MALEATE 1 DROP: 2; 5 SOLUTION/ DROPS OPHTHALMIC at 08:28

## 2023-05-17 RX ADMIN — DEXTROSE 30 G: 15 GEL ORAL at 01:15

## 2023-05-17 RX ADMIN — NIFEDIPINE 30 MG: 30 TABLET, FILM COATED, EXTENDED RELEASE ORAL at 08:27

## 2023-05-17 ASSESSMENT — ACTIVITIES OF DAILY LIVING (ADL)
ADLS_ACUITY_SCORE: 32

## 2023-05-17 NOTE — PLAN OF CARE
Goal Outcome Evaluation:    Summary: severe hypoglycemia & altered mental status.   DATE & TIME:  5/17/2023 [id number]  Cognitive Concerns/ Orientation : A&O x4,   BEHAVIOR & AGGRESSION TOOL COLOR: Green  ABNL VS/O2: VSS on RA  MOBILITY: Ax1 GB&Walker. Ambulated to bathroom, slightly unsteady gait w/ generalized weakness.   PAIN MANAGMENT: denies  DIET: Mod carb/carb count. Pt has at bedside pastries from home  that do not have adequate documentation for carb counting. Previous nurse spoke to family regarding carb count and food from home.   BOWEL/BLADDER: Incontinent of urine at times. Patient was able to have a BM during shift after spending some time on toilet. Offered stool softer, patient refused.  ABNL LAB/BG: BGM 83, 47,95,145  DRAIN/DEVICES: PIV SL x2   TELEMETRY RHYTHM: NA  SKIN: WDL  TESTS/PROCEDURES: n/a  D/C DAY/GOALS/PLACE: pending stable BGMs  OTHER IMPORTANT INFO: Pt is Sinhala speaking.Pt. bg 47 at 0100 per record on bg monitor. Record on bg monitor did not upload to epic. Treated with bg 30 mg glucose gel. Recheck at 0122  bg 95, administered 125ml  apple juice( per patient request) and 15mg glucose gel per order,  recheck at 0139 bg 145

## 2023-05-17 NOTE — PROGRESS NOTES
[facility]    Medicine Progress Note - Hospitalist Service    Date of Admission:  5/13/2023    Assessment & Plan   June MYLES Do is a 82 year old female admitted on 5/13/2023.  Past DM2; HTN; HLD; CKD; dementia; ACD; and glaucoma; who presents with AMS and found with profound hypoglycemia with elevated lactate.     On initial evaluation, patient was afebrile, hypertensive up to the 190s/110s, tachycardic.  EKG showed sinus tachycardia without acute ischemic changes.  Labs notable for glucose level of 15; BMP showed sodium 134, creatinine 1.87, calcium 8.7; CBC showed white count normal, hemoglobin 9.0, platelets normal; VBG showed pH of 7.12, PCO2 87.  Chest x-ray was negative for acute findings.  Head CT was negative for acute findings.        Profound hypoglycemia, resolved  Lactic acidosis, suspect related to above as well as metformin with CKD.  Metabolic encephalopathy due to above.  DM2 with retinopathy.  * Hgb A1C 11.1 3/2023.  Up to 14.5% this admission  * per PharmD med rec: Januvia and glipizide discontinued 3/21/23 and initiated on Jardiance.  Also on lantus 15 units daily, metformin 1000 mg bid  * Initial presentation as above. In the ER, patient was given IV fluids, IV dextrose with increased in the patient's glucose level to the 500s.  Patient's mental status subsequently improved significantly.  Patient's family reported that she had returned to near baseline.    * Of note is that the patient is on glargine which could greatly increase her risk of hypoglycemic episodes; at the same time, A1c is markedly elevated suggesting she may have some lability in her glucose control.   * per family report 5/14, she had not eaten lunch the day she presented; dose not have access to her medications and these are managed by her daughter so less likely due to medication error  * D5W discontinued morning of 5/15    - blood sugar 43 overnight (did not register in Epic), now 300 this AM  - not  able to do short acting insulin at home; will increase lantus to 15 units (home dose), stop prandial insulin, decrease sliding scale to medium dose with no bedtime sliding scale  - PRN hypoglycemia protocol.  - continue to hold prior to admission metformin, empagliflozin   - Will need to re-introduce PTA meds gradually. Also possibly stop metformin given CKD, lactic acidosis.  - encourage PO fluids  - PT recommending return home     HTN (benign essential).  * soft pressures with associated lightheadedness 5/14, improved with 1L fluid bolus  - carvedilol held since admission  - reports very transient lightheadedness today despite decrease in losartan and nifedipine this AM with orthostatics borderline positive with 18-point drop; will stop nifedipine and continue losartan at reduced dose of 25 mg daily for tomorrow     CKD3.   * Baseline cr appears to be mostly in mid to upper 1 range.  * Cr 1.87 on admit.  - Cr stable 1.6-1.7; no need for further monitoring  - Avoid nephrotoxic medications.     HLD.  - Continue PTA lovastatin (pravastatin subbed)     Dementia, unclear severity.  - Re-orient as needed.  - Maintain normal day/night, sleep/wake cycles  - Minimize sedating medications as able     Chronic anemia  * Hgb 9.0 with follow-up hgb normal; possibly hemoconcentrated. No overt clinical signs of major bleeding  * Per chart review, baseline hgb about 9 g/dL  * iron studies negative for deficiency  - Continue outpatient darbepoetin    Constipation  - prn senna, colace, miralax       Diet: Moderate Consistent Carb (60 g CHO per Meal) Diet    DVT Prophylaxis: Pneumatic Compression Devices  Sotelo Catheter: Not present  Lines: None     Cardiac Monitoring: None  Code Status: Full Code      Clinically Significant Risk Factors                        # DMII: A1C = 14.5 % (Ref range: <5.7 %) within past 6 months, PRESENT ON ADMISSION           Disposition Plan     Expected Discharge Date: 05/17/2023                  Roberto  MD Elise  Hospitalist Service  [facility]  Securely message with [employer] (more info)  Text page via [employer] Paging/Directory   ______________________________________________________________________    Interval History   Interviewed with professional Telugu  via phone.  She initially denies lightheadedness, but when asked if she had any today, she does report some very slight lightheadedness when first getting up with therapy.  Otherwise feeling well and hopeful for discharge tomorrow.     Physical Exam   Vital Signs: Temp: 99  F (37.2  C) Temp src: Oral BP: 111/55 (after walk) Pulse: 56   Resp: 17 SpO2: 97 % O2 Device: None (Room air)    Weight: 97 lbs 6.4 oz    General Appearance: Well nourished female in NAD, sitting up in chair  Respiratory: lungs CTAB, no wheezes or crackles  Cardiovascular: RRR, normal s1/s2 without murmur  GI: abdomen soft, normal bowel sounds  Skin: no edema   Other: Awake and alert, cranial nerves grossly intact, responding appropriately to questions    Medical Decision Making       40 MINUTES SPENT BY ME on the date of service doing chart review, history, exam, documentation & further activities per the note.  MANAGEMENT DISCUSSED with the following over the past 24 hours: patient's daughter, bedside RN, care coordinator   Tests ORDERED & REVIEWED in the past 24 hours:  - BMP  - glucose values  Medical complexity over the past 24 hours:  - Prescription DRUG MANAGEMENT performed      Data     I have personally reviewed the following data over the past 24 hrs:    N/A  \   N/A   / N/A     138 105 32.6 (H) /  291 (H)   4.3 22 1.64 (H) \

## 2023-05-17 NOTE — PROGRESS NOTES
Pt. bg 47 at 0100 per record in bg monitor. Record on bg monitor did not upload to epic. Treated with bg 30 mg glucose gel. Recheck at 0122  bg 95, received additional apple juice and 15mg glucose gel,  recheck at 0139 bg 145

## 2023-05-17 NOTE — PROGRESS NOTES
05/17/23 1030   Appointment Info   Signing Clinician's Name / Credentials (PT) Mariana Duncan PT   Living Environment   People in Home child(thalia), adult   Current Living Arrangements apartment   Home Accessibility no concerns   Self-Care   Usual Activity Tolerance moderate   Current Activity Tolerance moderate   Equipment Currently Used at Home walker, rolling   Fall history within last six months no   Activity/Exercise/Self-Care Comment Daughter helps with meals, med, cleaning, pt indep w self cares, mod indep mobility   General Information   Onset of Illness/Injury or Date of Surgery 05/16/23   Referring Physician Roberto Olivares MD   Pertinent History of Current Problem (include personal factors and/or comorbidities that impact the POC) 82 year old female admitted on 5/13/2023.  Past DM2; HTN; HLD; CKD; dementia; ACD; and glaucoma; who presents with AMS and found with profound hypoglycemia with elevated lactate.   Existing Precautions/Restrictions fall   Cognition   Affect/Mental Status (Cognition) flat/blunted affect;sad/depressed affect   Orientation Status (Cognition) oriented x 3   Follows Commands (Cognition) follows one-step commands;75-90% accuracy;repetition of directions required   Safety Deficit (Cognition) at risk behavior observed   Posture    Posture Forward head position   Range of Motion (ROM)   Range of Motion ROM is WNL   Strength (Manual Muscle Testing)   Strength (Manual Muscle Testing) Deficits observed during functional mobility   Bed Mobility   Comment, (Bed Mobility) mod independent   Transfers   Comment, (Transfers) SBA w WW   Gait/Stairs (Locomotion)   Comment, (Gait/Stairs) CGA w WW dec step length slow pace   Balance   Balance Comments impaired with walking req AD   Clinical Impression   Criteria for Skilled Therapeutic Intervention Yes, treatment indicated   PT Diagnosis (PT) imbalance   Influenced by the following impairments impaired gait; dec strength   Functional limitations  due to impairments impaired mobility   Clinical Presentation (PT Evaluation Complexity) Stable/Uncomplicated   Clinical Decision Making (Complexity) low complexity   Planned Therapy Interventions (PT) gait training;home exercise program;ROM (range of motion);strengthening;stretching;transfer training   Risk & Benefits of therapy have been explained evaluation/treatment results reviewed;care plan/treatment goals reviewed;risks/benefits reviewed   PT Discharge Planning   PT Discharge Recommendation (DC Rec) home with home care physical therapy   PT Rationale for DC Rec Pt mobilizing well and safely close to her baseline per her daughter who reports pt has limited activity at home and limited interest in engaging with other people and/or activities, pt does appear disengaged, expressing sadness with discussing her spouse and move to [place], pt and family benefitting from single session  with PT for mobilty progression and activity education, pt and daughter encouraged to keep trying with social groups in the community, home PT/OT for home safety and HEP set up, no further IP PT needs.

## 2023-05-17 NOTE — PLAN OF CARE
Summary: severe hypoglycemia & altered mental status.   DATE & TIME:  5/17/2023 [id number]  Cognitive Concerns/ Orientation : A&O x4, Kazakh speaking  BEHAVIOR & AGGRESSION TOOL COLOR: Green  ABNL VS/O2: VSS on RA, orthostatics not positive but do drop to 90's systolic. MD aware and adjusted medications, no dizziness today  MOBILITY: Ax1 GB&Walker. Ambulated x2, up to chair for meals.  PAIN MANAGMENT: denies  DIET: Mod carb, tolerating; family brings food from home (reminded them to call before patient eats for accurate blood sugars)  BOWEL/BLADDER: Incontinent of urine at times. Pt requested a stool softener again but did have BM overnight  ABNL LAB/BG: , 271, 206-stopped carb count, only sliding scale and increased lantus today. Creat 1.64- improved  DRAIN/DEVICES: PIV SL x2   TELEMETRY RHYTHM: NA  SKIN: WNL  TESTS/PROCEDURES: n/a  D/C DAY/GOALS/PLACE: pending stable BGMs, PT said patient can return home with assist 1-2 days?  OTHER IMPORTANT INFO: Pt is German speaking. Speaks basic English.

## 2023-05-18 ENCOUNTER — APPOINTMENT (OUTPATIENT)
Dept: INTERPRETER SERVICES | Facility: CLINIC | Age: 83
End: 2023-05-18
Payer: COMMERCIAL

## 2023-05-18 LAB
BACTERIA BLD CULT: NO GROWTH
BACTERIA BLD CULT: NO GROWTH
GLUCOSE BLDC GLUCOMTR-MCNC: 156 MG/DL (ref 70–99)
GLUCOSE BLDC GLUCOMTR-MCNC: 168 MG/DL (ref 70–99)
GLUCOSE BLDC GLUCOMTR-MCNC: 192 MG/DL (ref 70–99)
GLUCOSE BLDC GLUCOMTR-MCNC: 247 MG/DL (ref 70–99)
GLUCOSE BLDC GLUCOMTR-MCNC: 86 MG/DL (ref 70–99)

## 2023-05-18 PROCEDURE — 99232 SBSQ HOSP IP/OBS MODERATE 35: CPT | Performed by: HOSPITALIST

## 2023-05-18 PROCEDURE — 250N000013 HC RX MED GY IP 250 OP 250 PS 637: Performed by: INTERNAL MEDICINE

## 2023-05-18 PROCEDURE — 120N000001 HC R&B MED SURG/OB

## 2023-05-18 PROCEDURE — 250N000013 HC RX MED GY IP 250 OP 250 PS 637: Performed by: HOSPITALIST

## 2023-05-18 RX ADMIN — LOSARTAN POTASSIUM 25 MG: 25 TABLET, FILM COATED ORAL at 08:17

## 2023-05-18 RX ADMIN — BRIMONIDINE TARTRATE, TIMOLOL MALEATE 1 DROP: 2; 5 SOLUTION/ DROPS OPHTHALMIC at 08:21

## 2023-05-18 RX ADMIN — BRIMONIDINE TARTRATE, TIMOLOL MALEATE 1 DROP: 2; 5 SOLUTION/ DROPS OPHTHALMIC at 21:33

## 2023-05-18 RX ADMIN — PRAVASTATIN SODIUM 20 MG: 20 TABLET ORAL at 08:17

## 2023-05-18 ASSESSMENT — ACTIVITIES OF DAILY LIVING (ADL)
ADLS_ACUITY_SCORE: 32

## 2023-05-18 NOTE — PLAN OF CARE
Goal Outcome Evaluation:      Summary: severe hypoglycemia & altered mental status.   DATE & TIME:  5/17/2023-5/18/23 [id number]  Cognitive Concerns/ Orientation : A&O x4, Citizen of [place] speaking  BEHAVIOR & AGGRESSION TOOL COLOR: Green  ABNL VS/O2: VSS on RA.   MOBILITY: Ax1 GB&Walker.   PAIN MANAGMENT: denies  DIET: Mod carb, tolerating; family brings food from home   BOWEL/BLADDER: Incontinent of urine x1.  ABNL LAB/BG: ,156-stopped carb count, only sliding scale and increased lantus today. Creat 1.64- improved  DRAIN/DEVICES: PIV SL x2   TELEMETRY RHYTHM: NA  SKIN: WNL  TESTS/PROCEDURES: n/a  D/C DAY/GOALS/PLACE: pending stable BGMs, PT said patient can return home with assist  OTHER IMPORTANT INFO: Pt is Nigerian speaking. Speaks basic English.

## 2023-05-18 NOTE — PROGRESS NOTES
"Care Management Follow Up    Length of Stay (days): 5    Expected Discharge Date: 05/19/2023     Concerns to be Addressed:       Patient plan of care discussed at interdisciplinary rounds: Yes    Anticipated Discharge Disposition: Home, Home Care,     Anticipated Discharge Services HHC pending agency   (Anticipated Discharge DME:      Patient/family educated on Medicare website which has current facility and service quality ratings:    Education Provided on the Discharge Plan:    Patient/Family in Agreement with the Plan:      Referrals Placed by CM/SW:    Private pay costs discussed: insurance costs TBD    Additional Information:  Following for discharge plan to home.  Reviewed therapy notes which recommend home w/ HHC.   Talked with daughter Gloria in room (declined use of ) and discussed HHC.  She feels this would be good idea.  She would like to see her mom move more.  Feels only PT is needed.  Declined HHC RN.   Verified PCP/Home address.   Gloria notes Pt has a \"Home SW\" that follows her.  Aurelia [phone] listed as [facility] care coordinator on message.  Left message to discuss discharge planning but noted to be out of office on 5/18    Referral for Home PT sent to [facility] via [employer].  Will follow for acceptance  Noted to have [facility] plan which may make finding HHC more difficulty.  Daughter aware we may not find HHC prior to discharge and she would need to follow up with PCP    CC to follow for acceptance of HHC        Rizwana Kline RN      "

## 2023-05-18 NOTE — PROGRESS NOTES
[facility]    Medicine Progress Note - Hospitalist Service    Date of Admission:  5/13/2023    Assessment & Plan   June MYLES Do is a 82 year old female admitted on 5/13/2023.  Past DM2; HTN; HLD; CKD; dementia; ACD; and glaucoma; who presents with AMS and found with profound hypoglycemia with elevated lactate.     On initial evaluation, patient was afebrile, hypertensive up to the 190s/110s, tachycardic.  EKG showed sinus tachycardia without acute ischemic changes.  Labs notable for glucose level of 15; BMP showed sodium 134, creatinine 1.87, calcium 8.7; CBC showed white count normal, hemoglobin 9.0, platelets normal; VBG showed pH of 7.12, PCO2 87.  Chest x-ray was negative for acute findings.  Head CT was negative for acute findings.        Profound hypoglycemia, resolved  Lactic acidosis, suspect related to above as well as metformin with CKD, resolved  Metabolic encephalopathy due to above, resolved  DM2 with retinopathy.  Hyperglycemia   * Hgb A1C 11.1 3/2023.  Up to 14.5% this admission  * per PharmD med rec: Januvia and glipizide discontinued 3/21/23 and initiated on Jardiance.  Also on lantus 15 units daily, metformin 1000 mg bid  * Initial presentation as above. In the ER, patient was given IV fluids, IV dextrose with increased in the patient's glucose level to the 500s.  Patient's mental status subsequently improved significantly.  Patient's family reported that she had returned to near baseline.    * Of note is that the patient is on glargine which could greatly increase her risk of hypoglycemic episodes; at the same time, A1c is markedly elevated suggesting she may have some lability in her glucose control.   * per family report 5/14, she had not eaten lunch the day she presented; dose not have access to her medications and these are managed by her daughter so less likely due to medication error  * D5W discontinued morning of 5/15 and gradually resumed on insulin    - not able to  do short acting insulin at home (daughter works and patient home alone)  - glucose 86 this AM; decrease lantus to 12U (had received glucose gel yesterday AM so concerned 15U still too much)  - continue medium ssi with no bedtime dosing  - PRN hypoglycemia protocol  - continue to hold prior to admission metformin, empagliflozin   - Will need to re-introduce PTA meds gradually. Also possibly stop metformin given CKD, lactic acidosis.  - encourage PO fluids  - PT recommending return home     HTN (benign essential).  * soft pressures with associated lightheadedness 5/14, improved with 1L fluid bolus  * carvedilol held since admission, nifedipine and losartan dosing reduced due to report of lightheadedness with borderline positive orthostatic hypotension (18-point drop)  - received only losartan 25 mg on 05/18/23 with continued 18-point drop in SBP and ongoing mild lightheadedness - as remains normotensive, will stop all antihypertensives at this time  - daily orthostatics     CKD3.   * Baseline cr appears to be mostly in mid to upper 1 range.  * Cr 1.87 on admit.  - Cr stable 1.6-1.7  - Avoid nephrotoxic medications.  - repeat BMP in AM     HLD.  - Continue PTA lovastatin (pravastatin subbed)     Dementia, unclear severity.  - Re-orient as needed.  - Maintain normal day/night, sleep/wake cycles  - Minimize sedating medications as able     Chronic anemia  * Hgb 9.0 with follow-up hgb normal; possibly hemoconcentrated. No overt clinical signs of major bleeding  * Per chart review, baseline hgb about 9 g/dL  * iron studies negative for deficiency  - Continue outpatient darbepoetin    Constipation  - prn senna, colace, miralax       Diet: Moderate Consistent Carb (60 g CHO per Meal) Diet    DVT Prophylaxis: Pneumatic Compression Devices  Sotelo Catheter: Not present  Lines: None     Cardiac Monitoring: None  Code Status: Full Code      Clinically Significant Risk Factors                        # DMII: A1C = 14.5 % (Ref  range: <5.7 %) within past 6 months            Disposition Plan      Expected Discharge Date: 05/19/2023            Patient and daughter prefer to remain in hospital with ongoing medication adjustments, which is reasonable.  Anticipate discharge in 1-2 days pending stable glucose and BP.  High risk for readmission as patient at home alone during much of the day.        Roberto Olivares MD  Hospitalist Service  [facility]  Securely message with [employer] (more info)  Text page via [employer] Paging/Directory   ______________________________________________________________________    Interval History   Interviewed with professional Iraqi  via phone.  She continues to report mild orthostatic lightheadedness, but overall feeling better today.  Daughter at bedside, both patient and her concerned about discharge home with ongoing medication adjustments, would prefer to be on stable regimen first.     Physical Exam   Vital Signs: Temp: 98.4  F (36.9  C) Temp src: Oral BP: 130/55 Pulse: 60   Resp: 17 SpO2: 94 % O2 Device: None (Room air)    Weight: 95 lbs 4.8 oz    General Appearance: Well nourished female in NAD  Respiratory: lungs CTAB, no wheezes or crackles  Cardiovascular: RRR, normal s1/s2 without murmur  GI: abdomen soft, normal bowel sounds  Skin: no edema   Other: Awake and alert, cranial nerves grossly intact    Medical Decision Making       35 MINUTES SPENT BY ME on the date of service doing chart review, history, exam, documentation & further activities per the note.  MANAGEMENT DISCUSSED with the following over the past 24 hours: daughter, bedside nurse   Tests ORDERED & REVIEWED in the past 24 hours:  - glucose values  Medical complexity over the past 24 hours:  - Prescription DRUG MANAGEMENT performed      Data

## 2023-05-18 NOTE — PLAN OF CARE
Summary: severe hypoglycemia & altered mental status.   DATE & TIME:  5/18/2023 [id number]  Cognitive Concerns/ Orientation : A&O x4, Malay speaking  BEHAVIOR & AGGRESSION TOOL COLOR: Green  ABNL VS/O2: VSS on RA, orthostatic BP's negative but still dropped and patient has some dizziness still, HTN meds stopped per MD   MOBILITY: Ax1 GB&Walker. Ambulated and up to chair today  PAIN MANAGMENT: denies  DIET: Mod carb, tolerating; family brings food from home  BOWEL/BLADDER: Incontinent of urine. No BM today  ABNL LAB/BG: BGM 86, 247- lantus decreased today.   DRAIN/DEVICES: PIV SL x2   TELEMETRY RHYTHM: NA  SKIN: WNL  TESTS/PROCEDURES: n/a  D/C DAY/GOALS/PLACE: pending stable BGMs, PT said patient can return home with assist. Likely tomorrow  OTHER IMPORTANT INFO: Pt is Lao speaking. Speaks basic English.

## 2023-05-19 ENCOUNTER — APPOINTMENT (OUTPATIENT)
Dept: INTERPRETER SERVICES | Facility: CLINIC | Age: 83
End: 2023-05-19
Payer: COMMERCIAL

## 2023-05-19 LAB
ANION GAP SERPL CALCULATED.3IONS-SCNC: 12 MMOL/L (ref 7–15)
BUN SERPL-MCNC: 29.1 MG/DL (ref 8–23)
CALCIUM SERPL-MCNC: 9.1 MG/DL (ref 8.8–10.2)
CHLORIDE SERPL-SCNC: 106 MMOL/L (ref 98–107)
CREAT SERPL-MCNC: 1.43 MG/DL (ref 0.51–0.95)
DEPRECATED HCO3 PLAS-SCNC: 22 MMOL/L (ref 22–29)
GFR SERPL CREATININE-BSD FRML MDRD: 37 ML/MIN/1.73M2
GLUCOSE BLDC GLUCOMTR-MCNC: 107 MG/DL (ref 70–99)
GLUCOSE BLDC GLUCOMTR-MCNC: 150 MG/DL (ref 70–99)
GLUCOSE BLDC GLUCOMTR-MCNC: 160 MG/DL (ref 70–99)
GLUCOSE BLDC GLUCOMTR-MCNC: 176 MG/DL (ref 70–99)
GLUCOSE BLDC GLUCOMTR-MCNC: 199 MG/DL (ref 70–99)
GLUCOSE BLDC GLUCOMTR-MCNC: 309 MG/DL (ref 70–99)
GLUCOSE BLDC GLUCOMTR-MCNC: 352 MG/DL (ref 70–99)
GLUCOSE BLDC GLUCOMTR-MCNC: 412 MG/DL (ref 70–99)
GLUCOSE BLDC GLUCOMTR-MCNC: 67 MG/DL (ref 70–99)
GLUCOSE BLDC GLUCOMTR-MCNC: 67 MG/DL (ref 70–99)
GLUCOSE BLDC GLUCOMTR-MCNC: 80 MG/DL (ref 70–99)
GLUCOSE SERPL-MCNC: 142 MG/DL (ref 70–99)
POTASSIUM SERPL-SCNC: 4.1 MMOL/L (ref 3.4–5.3)
SODIUM SERPL-SCNC: 140 MMOL/L (ref 136–145)

## 2023-05-19 PROCEDURE — 99232 SBSQ HOSP IP/OBS MODERATE 35: CPT | Performed by: HOSPITALIST

## 2023-05-19 PROCEDURE — 120N000001 HC R&B MED SURG/OB

## 2023-05-19 PROCEDURE — 36415 COLL VENOUS BLD VENIPUNCTURE: CPT | Performed by: HOSPITALIST

## 2023-05-19 PROCEDURE — 82310 ASSAY OF CALCIUM: CPT | Performed by: HOSPITALIST

## 2023-05-19 PROCEDURE — 250N000013 HC RX MED GY IP 250 OP 250 PS 637: Performed by: INTERNAL MEDICINE

## 2023-05-19 RX ORDER — DEXTROSE MONOHYDRATE 25 G/50ML
25-50 INJECTION, SOLUTION INTRAVENOUS
Status: DISCONTINUED | OUTPATIENT
Start: 2023-05-19 | End: 2023-05-19

## 2023-05-19 RX ORDER — NICOTINE POLACRILEX 4 MG
15-30 LOZENGE BUCCAL
Status: DISCONTINUED | OUTPATIENT
Start: 2023-05-19 | End: 2023-05-19

## 2023-05-19 RX ADMIN — PRAVASTATIN SODIUM 20 MG: 20 TABLET ORAL at 09:45

## 2023-05-19 RX ADMIN — SENNOSIDES AND DOCUSATE SODIUM 1 TABLET: 50; 8.6 TABLET ORAL at 18:33

## 2023-05-19 RX ADMIN — DEXTROSE 15 G: 15 GEL ORAL at 01:54

## 2023-05-19 RX ADMIN — BRIMONIDINE TARTRATE, TIMOLOL MALEATE 1 DROP: 2; 5 SOLUTION/ DROPS OPHTHALMIC at 21:02

## 2023-05-19 RX ADMIN — BRIMONIDINE TARTRATE, TIMOLOL MALEATE 1 DROP: 2; 5 SOLUTION/ DROPS OPHTHALMIC at 09:45

## 2023-05-19 ASSESSMENT — ACTIVITIES OF DAILY LIVING (ADL)
ADLS_ACUITY_SCORE: 32

## 2023-05-19 NOTE — PROVIDER NOTIFICATION
MD Notification    Notified Person: MD    Notified Person Name: Christy     Notification Date/Time: 5/19/2023, 1145     Notification Interaction: [employer] messaging     Purpose of Notification: Hi, patients BG was 412. I will give the 6 units per sliding scale. Any further interventions? Thanks!    Orders Received:    Comments:

## 2023-05-19 NOTE — PLAN OF CARE
Goal Outcome Evaluation:    Shift: 3041-1079  A&Ox4. VSS on Rm Air. Denies pain. Denies dizziness. Tajik speaking. Incontinent of urine. No BM during shift. Ambulates w/ asstx1 & GB/Walker. Mod carb diet w/ BG checks AC & HS.  & 168. PIV SL to R forearm & LUE. Scattered bruising noted to skin. Possible discharge home tomorrow pending medically stable w/ safe disposition plan.

## 2023-05-19 NOTE — PROVIDER NOTIFICATION
MD Notification    Notified Person: MD    Notified Person Name: Christy    Notification Date/Time: 5/19/23 7047    Notification Interaction: Christinarea     Purpose of Notification: FYI- pts BG is 67, pt has food that family brought from home and is eating now. will recheck BG after she has eaten follow orders accordingly. thanks     Orders Received: hold 3 units of insulin and recheck for the sliding scale after pt eats    Comments:

## 2023-05-19 NOTE — PROGRESS NOTES
Care Management Follow Up    Length of Stay (days): 6    Expected Discharge Date: 05/20/2023     Concerns to be Addressed:       Patient plan of care discussed at interdisciplinary rounds: Yes    Anticipated Discharge Disposition: Home, Home Care     Anticipated Discharge Services: Other (see comment) (Pending therapy recommendations and patient's willingness to participate)  Anticipated Discharge DME:      Patient/family educated on Medicare website which has current facility and service quality ratings:    Education Provided on the Discharge Plan:    Patient/Family in Agreement with the Plan: yes    Referrals Placed by CM/SW: Homecare  Private pay costs discussed: Not applicable    Additional Information:  Updated [facility] that patient did not discharge today due to elevated blood sugar.      CC on discharge can send updated orders to [facility] (Ph  [phone]) via MITZI Kline RN

## 2023-05-19 NOTE — PROGRESS NOTES
"CLINICAL NUTRITION SERVICES - ASSESSMENT NOTE     Nutrition Prescription    RECOMMENDATIONS FOR MDs/PROVIDERS TO ORDER:  None at this time    Malnutrition Status:    Moderate malnutrition in the context of chronic illness    Recommendations already ordered by Registered Dietitian (RD):  Medical food supplement therapy - Ensure Enlive daily, vary flavor    Future/Additional Recommendations:  Monitor po intake, wt trends, BMs, labs     REASON FOR ASSESSMENT  June MYLES Do is a/an 82 year old female assessed by the dietitian for LOS    NUTRITION HISTORY  Dx: Altered mental status and found with profound hypoglycemia with elevated lactate.  PMH: DM2; HTN; HLD; CKD; dementia; ACD; and glaucoma    Met with pt and her daughter in room this morning, daughter acted as  for pt. Pt reports eating well, and getting outside food once a day - meat, veggie, and rice dish, which she eats 100% of as its comforting food to her. Pt and daughter agreed getting Ensure Enlive daily would be helpful to improve lean body mass.    CURRENT NUTRITION ORDERS  Diet: Moderate Consistent Carbohydrate  Intake/Tolerance: Pt ordering 2 meals/day (usually breakfast and dinner) and consuming % of ordered meals, and 100% of outside food brought to her once/day. Patient meeting % of estimated energy/protein needs from combined [employer] and outside food orders.     LABS  BUN: 32.6 (H)  Cr: 1.64 (H)  Glucose:  past 24 hrs    MEDICATIONS  Medications reviewed  Novolog  Lantus  Glucose gels    GI:   Last BM on 5/17 x2, some constipation    ANTHROPOMETRICS  Height: 157.5 cm (5' 2\")  Most Recent Weight: 43.2 kg (95 lb 4.8 oz)    IBW: 50 kg (110 lbs)  %IBW: 86%  UBW: 40 kg (88 lbs) pt reported, daughter confirmed  BMI: 17 - Underweight BMI <18.5   Weight History:   05/18/23 : 43.2 kg (95 lb 4.8 oz)   05/17/23 : 44.2 kg (97 lb 6.4 oz)   05/16/23 : 40.6 kg (89 lb 8 oz)   05/14/23 : 42.6 kg (93 lb 14.4 oz)   05/13/23 : 43.5 kg (96 lb) "   02/28/23 : 38.2 kg (84 lb 3.5 oz)     Dosing Weight: 40.6 kg actual body wt based on driest admit wt     ASSESSED NUTRITION NEEDS  Estimated Energy Needs: 7066-8315 kcals/day (30 - 35 kcals/kg)  Justification: Increased needs  Estimated Protein Needs: 49-61 grams protein/day (1.2 - 1.5 grams of pro/kg)  Justification: Increased needs  Estimated Fluid Needs: 9892-4573 mL/day (1 mL/kcal)   Justification: Maintenance    MALNUTRITION  % Intake: Decreased intake does not meet criteria  % Weight Loss: Weight loss does not meet criteria - pt with low body wt at baseline  Subcutaneous Fat Loss: Upper arm:  mild  Muscle Loss: Upper arm (bicep, tricep):  moderate, Dorsal hand: moderate, Upper leg (quadricep, hamstring): mild and Posterior calf: mild  Fluid Accumulation/Edema: None noted  Malnutrition Diagnosis: Moderate malnutrition in the context of chronic illness    NUTRITION DIAGNOSIS  No nutrition diagnosis, pt meeting needs with current intake    INTERVENTIONS  Implementation  Medical food supplement therapy - Ensure Enlive daily    Goals  Patient to consume % of nutritionally adequate meal trays TID, or the equivalent with supplements/snacks.     Monitoring/Evaluation  Progress toward goals will be monitored and evaluated per protocol.    Edwin Dudley - Dietetic Intern

## 2023-05-19 NOTE — PLAN OF CARE
Summary: [id number] severe hypoglycemia and AMS  Orientation: A/Ox4   Activity Level: Ax1 to SBA GB/W  Fall Risk: yes  Behavior & Aggression Tool Color: green  Pain Management: denies  ABNL VS/O2: VSS on RA  ABNL Lab/BG: BG 67 and 160  Diet: mod carb but family bring her food from home  Bowel/Bladder: incontinent of bladder at times no BM  Drains/Devices: PIV saline locked  Tests/Procedures for next shift: n/a  Anticipated DC date: pending stable BG  Other Important Info: Belarusian speaking requires interpretor

## 2023-05-19 NOTE — PLAN OF CARE
Goal Outcome Evaluation:      BEHAVIOR & AGGRESSION TOOL COLOR: Green, calm/cooperative  ABNL VS/O2: slightly elevated BP, other VSS on RA  MOBILITY: Ax1 GB/W  PAIN MANAGMENT: denies  DIET: Mod carb, tolerating; family brings food from home  BOWEL/BLADDER: Incontinent of urine at times, no BM  ABNL LAB/BG: BGM 67/80/199    DRAIN/DEVICES: PIV SL x2   TELEMETRY RHYTHM: NA  SKIN: WNL  TESTS/PROCEDURES: n/a  D/C DAY/GOALS/PLACE: pending stable BGMs and BP, PT recommended return home with assist  OTHER IMPORTANT INFO: Pt pleasant, communicates basic needs, denies dizziness/pain, Greek speaking. Bg 67 at 0200-Glucose 15 mg gel given, re-check 80/199. SW following for safe disposition.

## 2023-05-19 NOTE — DISCHARGE SUMMARY
[facility]  Hospitalist Discharge Summary      Date of Admission:  5/13/2023  Date of Discharge:  5/19/2023  Discharging Provider: Matheus Harrison DO  Discharge Service: Hospitalist Service    Discharge Diagnoses   Profound hypoglycemia, resolved  Lactic acidosis, suspect related to above as well as metformin with CKD, resolved  Metabolic encephalopathy due to above, resolved  DM2 with retinopathy.  Hyperglycemia   HTN (benign essential).    Clinically Significant Risk Factors     # DMII: A1C = 14.5 % (Ref range: <5.7 %) within past 6 months       Follow-ups Needed After Discharge   Follow-up Appointments     Follow-up and recommended labs and tests       Follow up with primary care provider, Cordelia Clark, within 7 days to   evaluate medication change and for hospital follow- up.  The following   labs/tests are recommended: basic metabolic panel.             Unresulted Labs Ordered in the Past 30 Days of this Admission     No orders found from 4/13/2023 to 5/14/2023.      These results will be followed up by PCP    Discharge Disposition   Discharged to home  Condition at discharge: Stable    Hospital Course     Profound hypoglycemia, resolved  Lactic acidosis, suspect related to above as well as metformin with CKD, resolved  Metabolic encephalopathy due to above, resolved  DM2 with retinopathy.  Hyperglycemia   * Hgb A1C 11.1 3/2023.  Up to 14.5% this admission  * per PharmD med rec: Januvia and glipizide discontinued 3/21/23 and initiated on Jardiance.  Also on lantus 15 units daily, metformin 1000 mg bid  * Initial presentation as above. In the ER, patient was given IV fluids, IV dextrose with increased in the patient's glucose level to the 500s.  Patient's mental status subsequently improved significantly.  Patient's family reported that she had returned to near baseline.    * Of note is that the patient is on glargine which could greatly increase her risk of hypoglycemic  episodes; at the same time, A1c is markedly elevated suggesting she may have some lability in her glucose control.   * per family report 5/14, she had not eaten lunch the day she presented; dose not have access to her medications and these are managed by her daughter so less likely due to medication error  * D5W discontinued morning of 5/15 and gradually resumed on insulin   plan  - not able to do short acting insulin at home (daughter works and patient home alone)   - reduce lantus to 9 units on discharge  - PRN hypoglycemia protocol  - stop metformin  - can resume jardiance  - encourage PO fluids  - PT recommending return home with home health RN and PT which are ordered. Daughter in law and caretaker said she IS agreeable to home nurse     HTN (benign essential).  * soft pressures with associated lightheadedness 5/14, improved with 1L fluid bolus  * carvedilol held since admission, nifedipine and losartan dosing reduced due to report of lightheadedness with borderline positive orthostatic hypotension (18-point drop)  Plan  - continuing to hold her nifedipine, osartan, and coreg due to appropriate BP on discharge. She can follow-up with her PCP     CKD3.   * Baseline cr appears to be mostly in mid to upper 1 range.  * Cr 1.87 on admit.  - Cr stable 1.6-1.7  - Avoid nephrotoxic medications.  - repeat BMP in 1 week     HLD.  - Continue PTA lovastatin (pravastatin subbed)     Dementia, unclear severity.  - Re-orient as needed.  - Maintain normal day/night, sleep/wake cycles  - Minimize sedating medications as able     Chronic anemia  * Hgb 9.0 with follow-up hgb normal; possibly hemoconcentrated. No overt clinical signs of major bleeding  * Per chart review, baseline hgb about 9 g/dL  * iron studies negative for deficiency  - Continue outpatient darbepoetin     Constipation  - prn senna, colace, miralax       Consultations This Hospital Stay   CARE MANAGEMENT / SOCIAL WORK IP CONSULT  PHYSICAL THERAPY ADULT IP  CONSULT    Code Status   Full Code    Time Spent on this Encounter   I, Matheus Harrison DO, personally saw the patient today and spent greater than 30 minutes discharging this patient.       Matheus Harrison DO  [place] [address] [facility]  [address] [place] [address]  Phone: [phone]  ______________________________________________________________________    Physical Exam   Vital Signs: Temp: 99  F (37.2  C) Temp src: Oral BP: 119/51 Pulse: 56   Resp: 17 SpO2: 95 % O2 Device: None (Room air)    Weight: 95 lbs 4.8 oz  General Appearance:  Well nourished female in NAD  Respiratory: lungs CTAB, no wheezes or crackles  Cardiovascular: RRR, normal s1/s2 without murmur  GI: abdomen soft, normal bowel sounds  Skin: no edema   Other:  Awake and alert, cranial nerves grossly intact       Primary Care Physician   Cordelia Clark    Discharge Orders      Home Care Referral      Reason for your hospital stay    Low blood sugar  Multiple medication adjustments     Follow-up and recommended labs and tests     Follow up with primary care provider, Cordelia Clark, within 7 days to evaluate medication change and for hospital follow- up.  The following labs/tests are recommended: basic metabolic panel.     Activity    Your activity upon discharge: activity as tolerated     Diet    Follow this diet upon discharge: Orders Placed This Encounter      Moderate Consistent Carb (60 g CHO per Meal) Diet       Significant Results and Procedures   Most Recent 3 CBC's:Recent Labs   Lab Test 05/15/23  0925 05/13/23  1957 05/13/23  1951 02/28/23  1419   WBC 7.5  --  8.8 6.7   HGB 7.8* 11.9 9.0* 8.9*   MCV 65*  --  67* 65*     --  193 252     Most Recent 3 BMP's:Recent Labs   Lab Test 05/19/23  0912 05/19/23  0832 05/19/23  0630 05/17/23  1137 05/17/23  0814 05/15/23  0934 05/15/23  0925   NA  --  140  --   --  138  --  139   POTASSIUM  --  4.1  --   --  4.3  --  4.1   CHLORIDE  --  106   --   --  105  --  106   CO2  --  22  --   --  22  --  21*   BUN  --  29.1*  --   --  32.6*  --  29.3*   CR  --  1.43*  --   --  1.64*  --  1.77*   ANIONGAP  --  12  --   --  11  --  12   MANUEL  --  9.1  --   --  8.7*  --  8.3*   * 142* 107*   < > 291*   < > 278*    < > = values in this interval not displayed.     Most Recent 2 LFT's:Recent Labs   Lab Test 05/13/23 1951 02/28/23  1419   AST 24 13   ALT 10 9*   ALKPHOS 66 77   BILITOTAL <0.2 0.6   ,   Results for orders placed or performed during the hospital encounter of 05/13/23   Head CT w/o contrast    Narrative    EXAM: CT HEAD W/O CONTRAST  LOCATION: [facility]  DATE/TIME: 5/13/2023 8:31 PM CDT    INDICATION: AMS, HTN, hypoglycemia.  COMPARISON: Brain MRI 11/04/2022.  TECHNIQUE: Routine CT Head without IV contrast. Multiplanar reformats. Dose reduction techniques were used.    FINDINGS:  INTRACRANIAL CONTENTS: No intracranial hemorrhage, extraaxial collection, or mass effect. 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 Mild presumed chronic small vessel ischemic changes. Mild generalized volume loss. No hydrocephalus.   Calcified plaque distal vertebral arteries and carotid siphons.     VISUALIZED ORBITS/SINUSES/MASTOIDS: No intraorbital abnormality. No paranasal sinus mucosal disease. No middle ear or mastoid effusion.    BONES/SOFT TISSUES: No acute abnormality.      Impression    IMPRESSION:  1.  No CT evidence for acute intracranial process.  2.  Brain atrophy and presumed chronic microvascular ischemic changes as above.   XR Chest Port 1 View    Narrative    EXAM: XR CHEST PORT 1 VIEW  LOCATION: [facility]  DATE/TIME: 5/13/2023 8:05 PM CDT    INDICATION: AMS, ? aspiration  COMPARISON: 02/24/2023      Impression    IMPRESSION: Lungs are clear. Heart and pulmonary vascularity are normal. No signs of pneumonia or failure.       Discharge Medications   Current Discharge Medication List       CONTINUE these medications which have CHANGED    Details   insulin glargine (LANTUS PEN) 100 UNIT/ML pen Inject 9 Units Subcutaneous every morning (before breakfast)  Qty: 15 mL    Comments: If Lantus is not covered by insurance, may substitute Basaglar or Semglee or other insulin glargine product per insurance preference at same dose and frequency.    Associated Diagnoses: Severe diabetic hypoglycemia (H)         CONTINUE these medications which have NOT CHANGED    Details   aspirin (ASA) 81 MG chewable tablet Take 81 mg by mouth daily      brimonidine-timolol (COMBIGAN) 0.2-0.5 % ophthalmic solution Place 1 drop into both eyes 2 times daily      darbepoetin claudette (ARANESP, ALBUMIN FREE,) 60 MCG/0.3ML injection Inject 60 mcg Subcutaneous every 14 days      docusate sodium (COLACE) 100 MG capsule Take 100 mg by mouth 2 times daily      empagliflozin (JARDIANCE) 10 MG TABS tablet Take 10 mg by mouth daily      Ferrous Gluconate 324 (37.5 Fe) MG TABS Take 324 mg by mouth daily      levocetirizine (XYZAL) 5 MG tablet Take 5 mg by mouth every evening      lovastatin (MEVACOR) 20 MG tablet Take 20 mg by mouth At Bedtime      vitamin D3 (CHOLECALCIFEROL) 50 mcg (2000 units) tablet Take 1 tablet by mouth daily         STOP taking these medications       carvedilol (COREG) 6.25 MG tablet Comments:   Reason for Stopping:         losartan (COZAAR) 50 MG tablet Comments:   Reason for Stopping:         metFORMIN (GLUCOPHAGE) 1000 MG tablet Comments:   Reason for Stopping:         NIFEdipine ER OSMOTIC (PROCARDIA XL) 60 MG 24 hr tablet Comments:   Reason for Stopping:             Allergies   No Known Allergies

## 2023-05-19 NOTE — PROGRESS NOTES
Care Management Follow Up    Length of Stay (days): 6    Expected Discharge Date: 05/19/2023     Concerns to be Addressed:       Patient plan of care discussed at interdisciplinary rounds: Yes    Anticipated Discharge Disposition: Home, Home Care     Anticipated Discharge Services: Other (see comment) (Pending therapy recommendations and patient's willingness to participate)  Anticipated Discharge DME:      Patient/family educated on Medicare website which has current facility and service quality ratings:    Education Provided on the Discharge Plan:    Patient/Family in Agreement with the Plan: yes    Referrals Placed by CM/SW: Homecare  Private pay costs discussed: Not applicable    Additional Information:  Following for discharge planning.     Pt was accepted by [facility] for PT.   CC will follow for discharge and update of timing/fax orders.     CC received call from   Aurelia [phone] who notes she follows for [facility] plan.  She does yearly in home visits (completed one recently).  She gave family resources of in home help/hours approved which Pt declined due to lack of trust in outside people.  Resources also given on Adult  which was tried but also declined by family.  Aurelia has given resources on [facility] which Daughter/family are considering.  Aurelia updated of plan to home w/ [facility] PT.  She will follow. She does not need anything at discharge as she can pull from epic.     CC to follow for discharge plan.       Rizwana Kline RN

## 2023-05-19 NOTE — PLAN OF CARE
Goal Outcome Evaluation:      Plan of Care Reviewed With: patient, family          Outcome Evaluation: Pt chronically underweight but eating adequate meals via HT and from outside food. Getting meat/veg/rice meal daily from daughter.

## 2023-05-19 NOTE — PROVIDER NOTIFICATION
MD Notification    Notified Person: MD    Notified Person Name: Christy    Notification Date/Time: 5/19/2023, 1300    Notification Interaction: [employer] [facility] messaging     Purpose of Notification: Read - 1:08 pm  Hi BG was 352. Would you like me to give the additional 3 units now? Thanks!      Orders Received:  Yes     Comments:

## 2023-05-19 NOTE — PROGRESS NOTES
Care Management Discharge Note    Discharge Date: 05/19/2023       Discharge Disposition: Home, Home Care    Discharge Services: Other (see comment) (Pending therapy recommendations and patient's willingness to participate)    Discharge DME:      Discharge Transportation: family or friend will provide    Private pay costs discussed: Not applicable    Does the patient's insurance plan have a 3 day qualifying hospital stay waiver?  No    PAS Confirmation Code:    Patient/family educated on Medicare website which has current facility and service quality ratings:      Education Provided on the Discharge Plan:    Persons Notified of Discharge Plans: Bedside RN/MD  Patient/Family in Agreement with the Plan: yes    Handoff Referral Completed: No    Additional Information:  Pt discharge to home w/ HHC.   Per MD Pt is ready to discharge today. Now wants Home RN/PT    CC called [facility] ([facility]) and updated of discharge. Orders faxed via DOD.     Information added to AVS  Bedside RN to review AVS  Family to transport.         Rizwana Kline RN

## 2023-05-19 NOTE — PLAN OF CARE
Goal Outcome Evaluation:      Plan of Care Reviewed With: patient    Summary: severe hypoglycemia & altered mental status. Kyrgyz speaking, requires interpretor  DATE & TIME:  5/19/2023, 0526-7935   BEHAVIOR & AGGRESSION TOOL COLOR: Green, calm/cooperative  ABNL VS/O2: VSS on RA, Q4hr vital checks  MOBILITY: Ax1 GB/W  PAIN MANAGMENT: denies  DIET: Mod carb, tolerating; family brings food from home  BOWEL/BLADDER: Incontinent of urine at times, no BM  ABNL LAB/BG: WT=879, 176, 412, 352 (MD aware, insulin plan adjusted) Creatinine=1.43   DRAIN/DEVICES: PIV SL x2   TELEMETRY RHYTHM: NA  SKIN: WNL  TESTS/PROCEDURES: n/a  D/C DAY/GOALS/PLACE: pending stable BGMs and BP, PT recommended return home with assist  OTHER IMPORTANT INFO: Step daughter at bedside and updated on plan of care. SW following for safe disposition.

## 2023-05-20 VITALS
OXYGEN SATURATION: 97 % | RESPIRATION RATE: 17 BRPM | SYSTOLIC BLOOD PRESSURE: 163 MMHG | TEMPERATURE: 98.3 F | DIASTOLIC BLOOD PRESSURE: 66 MMHG | WEIGHT: 83.78 LBS | HEART RATE: 53 BPM | BODY MASS INDEX: 15.32 KG/M2

## 2023-05-20 LAB
GLUCOSE BLDC GLUCOMTR-MCNC: 254 MG/DL (ref 70–99)
GLUCOSE BLDC GLUCOMTR-MCNC: 261 MG/DL (ref 70–99)

## 2023-05-20 PROCEDURE — 99239 HOSP IP/OBS DSCHRG MGMT >30: CPT | Performed by: HOSPITALIST

## 2023-05-20 PROCEDURE — 250N000013 HC RX MED GY IP 250 OP 250 PS 637: Performed by: INTERNAL MEDICINE

## 2023-05-20 RX ORDER — INSULIN LISPRO 100 [IU]/ML
2 INJECTION, SOLUTION SUBCUTANEOUS
Qty: 5 ML | Refills: 0 | Status: SHIPPED | OUTPATIENT
Start: 2023-05-20 | End: 2023-08-09

## 2023-05-20 RX ADMIN — PRAVASTATIN SODIUM 20 MG: 20 TABLET ORAL at 08:24

## 2023-05-20 RX ADMIN — BRIMONIDINE TARTRATE, TIMOLOL MALEATE 1 DROP: 2; 5 SOLUTION/ DROPS OPHTHALMIC at 08:39

## 2023-05-20 ASSESSMENT — ACTIVITIES OF DAILY LIVING (ADL)
ADLS_ACUITY_SCORE: 32

## 2023-05-20 NOTE — PLAN OF CARE
Adequate for discharge. Will be starting novolog at home and will need to follow up with an endocrinologist. Home care nursing agency will assess patient after discharge as well. Daughter present at bedside during discharge education. All questions answered.

## 2023-05-20 NOTE — CONSULTS
Care Management Discharge Note    Discharge Date: 05/20/2023       Discharge Disposition: Home, Home Care    Discharge Services: Other (see comment) (Pending therapy recommendations and patient's willingness to participate)    Discharge DME:  n/a    Discharge Transportation: family or friend will provide    Private pay costs discussed: Not applicable    Does the patient's insurance plan have a 3 day qualifying hospital stay waiver?  No    PAS Confirmation Code:  n/a  Patient/family educated on Medicare website which has current facility and service quality ratings:  no    Education Provided on the Discharge Plan:  yes  Persons Notified of Discharge Plans: Patient's daughter Gloria by phone, bedside RN  Patient/Family in Agreement with the Plan: yes    Handoff Referral Completed: Yes Homecare [facility], PCP    Additional Information:  Writer called patient's Daughter Gloria by phone. Patient is cleared for discharge to home with recommendations for PCP, Endocrine and homecare RN/PT/OT through [facility].   Gloria is on her way to the hospital and is aware bedside will go through discharge orders.  Gloira is in communication with patients [facility] regarding community resources for adult  and plans on getting this started.  Gloria had no further questions at this time. Bedside RN paging provider for new meds to be printed out to our discharge RX per Bee with discharge RX they will need the printed med(s) to go down ASAP as they are closing bedside aware and was paging the provider.  Writer faxed orders for homecare and called Aurelia with update on discharge.  No further needs identified.    Karie Gillette RN, BSN, ACM   Care Transitions Specialist  [facility]  Care Transitions Specialist  [phone] [place] [phone]  [email or web address]  Office: [phone] Fax: [phone]  [facility]

## 2023-05-20 NOTE — DISCHARGE SUMMARY
[facility]  Hospitalist Discharge Summary      Date of Admission:  5/13/2023  Date of Discharge:  5/19/2023  Discharging Provider: Matheus Harrison DO  Discharge Service: Hospitalist Service    Discharge Diagnoses   Profound hypoglycemia, resolved  Lactic acidosis, suspect related to above as well as metformin with CKD, resolved  Metabolic encephalopathy due to above, resolved  DM2 with retinopathy.  Hyperglycemia   HTN (benign essential).    Clinically Significant Risk Factors     # DMII: A1C = 14.5 % (Ref range: <5.7 %) within past 6 months  # Moderate Malnutrition: based on nutrition assessment      Follow-ups Needed After Discharge   Follow-up Appointments     Follow-up and recommended labs and tests       Follow up with primary care provider, Cordelia Clark, within 7 days to   evaluate medication change and for hospital follow- up.  The following   labs/tests are recommended: basic metabolic panel.             Unresulted Labs Ordered in the Past 30 Days of this Admission     No orders found from 4/13/2023 to 5/14/2023.      These results will be followed up by PCP    Discharge Disposition   Discharged to home  Condition at discharge: Stable    Hospital Course     Profound hypoglycemia, resolved  Lactic acidosis, suspect related to above as well as metformin with CKD, resolved  Metabolic encephalopathy due to above, resolved  DM2 with retinopathy.  Hyperglycemia   * Hgb A1C 11.1 3/2023.  Up to 14.5% this admission  * per PharmD med rec: Januvia and glipizide discontinued 3/21/23 and initiated on Jardiance.  Also on lantus 15 units daily, metformin 1000 mg bid  * Initial presentation as above. In the ER, patient was given IV fluids, IV dextrose with increased in the patient's glucose level to the 500s.  Patient's mental status subsequently improved significantly.  Patient's family reported that she had returned to near baseline.    * Of note is that the patient is on glargine which  could greatly increase her risk of hypoglycemic episodes; at the same time, A1c is markedly elevated suggesting she may have some lability in her glucose control.   * per family report 5/14, she had not eaten lunch the day she presented; dose not have access to her medications and these are managed by her daughter so less likely due to medication error  * D5W discontinued morning of 5/15 and gradually resumed on insulin  * discharge cancelled on 5/19 due to hyperglycemia.  * noted the patient has volatile blood sugars, with episodes of hyperglycemia and hypoglycemia complicated by significant problems with dementia. Her daughter in law and primary care giver Gloria is away from the house working during the day, and so this limits how complicated the insulin regimen can be  * additionally, she does not often eat the hospital food and so drastic variability of oral intake on admission, as she is dependent on when family can bring her own food in  * our focus currently will be avoiding hypoglycemia in the short term  * long term, they may benefit from more intensive carb counting and sliding scale, but that would take significant family investment which they are not currently able to provide  * would advise    plan  - not able to do short acting insulin at home (daughter works and patient home alone)   - reduce lantus to 10 units on discharge  - 2 units of short acting with breakfast and dinner when at home (meals when daughter is present and sees her eat).  - PRN hypoglycemia protocol  - stop metformin given concern of metformin induced metabolic acidosis  - can resume jardiance  - encourage PO fluids  - ordered home RN to help with diabetes management. Perhaps overtime there will be more opportunity  - referral to endocrinology. She would be a good candidate for a device  - discussed with family that they need to be checking her blood sugars when she can in the morning while fasting and at night after  dinner     HTN (benign essential).  * soft pressures with associated lightheadedness 5/14, improved with 1L fluid bolus  * carvedilol held since admission, nifedipine and losartan dosing reduced due to report of lightheadedness with borderline positive orthostatic hypotension (18-point drop)  Plan  - continuing to hold her nifedipine, osartan, and coreg due to appropriate BP on discharge. She can follow-up with her PCP     CKD3.   * Baseline cr appears to be mostly in mid to upper 1 range.  * Cr 1.87 on admit.  - Cr stable 1.6-1.7  - Avoid nephrotoxic medications.  - repeat BMP in 1 week     HLD.  - Continue PTA lovastatin (pravastatin subbed)     Dementia, unclear severity.  - Re-orient as needed.  - Maintain normal day/night, sleep/wake cycles  - Minimize sedating medications as able     Chronic anemia  * Hgb 9.0 with follow-up hgb normal; possibly hemoconcentrated. No overt clinical signs of major bleeding  * Per chart review, baseline hgb about 9 g/dL  * iron studies negative for deficiency  - Continue outpatient darbepoetin     Constipation  - prn senna, colace, miralax       Consultations This Hospital Stay   CARE MANAGEMENT / SOCIAL WORK IP CONSULT  PHYSICAL THERAPY ADULT IP CONSULT    Code Status   Full Code    Time Spent on this Encounter   I, Matheus Harrison DO, personally saw the patient today and spent greater than 30 minutes discharging this patient.       Matheus Harrison DO  [place] [address] [facility]  [address] [place] [address]  Phone: [phone]  ______________________________________________________________________    Physical Exam   Vital Signs: Temp: 98.3  F (36.8  C) Temp src: Oral BP: (!) 163/66 Pulse: 53   Resp: 17 SpO2: 97 % O2 Device: None (Room air)    Weight: 83 lbs 12.4 oz  General Appearance:  Well nourished female in NAD  Respiratory: lungs CTAB, no wheezes or crackles  Cardiovascular: RRR, normal s1/s2 without murmur  GI: abdomen soft,  normal bowel sounds  Skin: no edema   Other:  Awake and alert, cranial nerves grossly intact       Primary Care Physician   Cordelia Clark    Discharge Orders      Home Care Referral      Adult Endocrinology [facility] Referral      Reason for your hospital stay    Low blood sugar  Multiple medication adjustments     Follow-up and recommended labs and tests     Follow up with primary care provider, Cordelia Clark, within 7 days to evaluate medication change and for hospital follow- up.  The following labs/tests are recommended: basic metabolic panel.     Activity    Your activity upon discharge: activity as tolerated     Diet    Follow this diet upon discharge: Orders Placed This Encounter      Moderate Consistent Carb (60 g CHO per Meal) Diet       Significant Results and Procedures   Most Recent 3 CBC's:  Recent Labs   Lab Test 05/15/23  0925 05/13/23  1957 05/13/23  1951 02/28/23  1419   WBC 7.5  --  8.8 6.7   HGB 7.8* 11.9 9.0* 8.9*   MCV 65*  --  67* 65*     --  193 252     Most Recent 3 BMP's:  Recent Labs   Lab Test 05/20/23  0820 05/20/23  0657 05/19/23 2058 05/19/23  0912 05/19/23  0832 05/17/23  1137 05/17/23  0814 05/15/23  0934 05/15/23  0925   NA  --   --   --   --  140  --  138  --  139   POTASSIUM  --   --   --   --  4.1  --  4.3  --  4.1   CHLORIDE  --   --   --   --  106  --  105  --  106   CO2  --   --   --   --  22  --  22  --  21*   BUN  --   --   --   --  29.1*  --  32.6*  --  29.3*   CR  --   --   --   --  1.43*  --  1.64*  --  1.77*   ANIONGAP  --   --   --   --  12  --  11  --  12   MANUEL  --   --   --   --  9.1  --  8.7*  --  8.3*   * 261* 309*   < > 142*   < > 291*   < > 278*    < > = values in this interval not displayed.     Most Recent 2 LFT's:  Recent Labs   Lab Test 05/13/23 1951 02/28/23  1419   AST 24 13   ALT 10 9*   ALKPHOS 66 77   BILITOTAL <0.2 0.6   ,   Results for orders placed or performed during the hospital encounter of 05/13/23   Head CT w/o contrast     Narrative    EXAM: CT HEAD W/O CONTRAST  LOCATION: [facility]  DATE/TIME: 5/13/2023 8:31 PM CDT    INDICATION: AMS, HTN, hypoglycemia.  COMPARISON: Brain MRI 11/04/2022.  TECHNIQUE: Routine CT Head without IV contrast. Multiplanar reformats. Dose reduction techniques were used.    FINDINGS:  INTRACRANIAL CONTENTS: No intracranial hemorrhage, extraaxial collection, or mass effect. 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 Mild presumed chronic small vessel ischemic changes. Mild generalized volume loss. No hydrocephalus.   Calcified plaque distal vertebral arteries and carotid siphons.     VISUALIZED ORBITS/SINUSES/MASTOIDS: No intraorbital abnormality. No paranasal sinus mucosal disease. No middle ear or mastoid effusion.    BONES/SOFT TISSUES: No acute abnormality.      Impression    IMPRESSION:  1.  No CT evidence for acute intracranial process.  2.  Brain atrophy and presumed chronic microvascular ischemic changes as above.   XR Chest Port 1 View    Narrative    EXAM: XR CHEST PORT 1 VIEW  LOCATION: [facility]  DATE/TIME: 5/13/2023 8:05 PM CDT    INDICATION: AMS, ? aspiration  COMPARISON: 02/24/2023      Impression    IMPRESSION: Lungs are clear. Heart and pulmonary vascularity are normal. No signs of pneumonia or failure.       Discharge Medications   Current Discharge Medication List      START taking these medications    Details   insulin lispro (HUMALOG ELSY KWIKPEN) 100 UNIT/ML (0.5 unit dial) KWIKPEN Inject 2 Units Subcutaneous 2 times daily (before meals)  Qty: 5 mL, Refills: 0    Associated Diagnoses: Severe diabetic hypoglycemia (H)         CONTINUE these medications which have CHANGED    Details   insulin glargine (LANTUS PEN) 100 UNIT/ML pen Inject 10 Units Subcutaneous every morning (before breakfast)  Qty: 15 mL    Comments: If Lantus is not covered by insurance, may substitute Basaglar or Semglee or other insulin glargine product  per insurance preference at same dose and frequency.    Associated Diagnoses: Severe diabetic hypoglycemia (H)         CONTINUE these medications which have NOT CHANGED    Details   aspirin (ASA) 81 MG chewable tablet Take 81 mg by mouth daily      brimonidine-timolol (COMBIGAN) 0.2-0.5 % ophthalmic solution Place 1 drop into both eyes 2 times daily      darbepoetin claudette (ARANESP, ALBUMIN FREE,) 60 MCG/0.3ML injection Inject 60 mcg Subcutaneous every 14 days      docusate sodium (COLACE) 100 MG capsule Take 100 mg by mouth 2 times daily      empagliflozin (JARDIANCE) 10 MG TABS tablet Take 10 mg by mouth daily      Ferrous Gluconate 324 (37.5 Fe) MG TABS Take 324 mg by mouth daily      levocetirizine (XYZAL) 5 MG tablet Take 5 mg by mouth every evening      lovastatin (MEVACOR) 20 MG tablet Take 20 mg by mouth At Bedtime      vitamin D3 (CHOLECALCIFEROL) 50 mcg (2000 units) tablet Take 1 tablet by mouth daily         STOP taking these medications       carvedilol (COREG) 6.25 MG tablet Comments:   Reason for Stopping:         losartan (COZAAR) 50 MG tablet Comments:   Reason for Stopping:         metFORMIN (GLUCOPHAGE) 1000 MG tablet Comments:   Reason for Stopping:         NIFEdipine ER OSMOTIC (PROCARDIA XL) 60 MG 24 hr tablet Comments:   Reason for Stopping:             Allergies   No Known Allergies

## 2023-05-20 NOTE — DISCHARGE INSTRUCTIONS
- Please follow up with an endocrinologist (diabetes specialist) due to rapidly fluctuating blood sugars (a referral was placed)  - Check blood sugars while fasting first thing in the morning and later in the day after a meal  - Keep a written diary or account of these blood sugar numbers including the number, the time of day the blood sugar was obtained. Take these blood sugars to your regular doctor to help them adjust diabetic medications  - Please give 2 units of the short acting insulin with meals that you witness Tieng take, most likely breakfast and dinner

## 2023-05-20 NOTE — PLAN OF CARE
Goal Outcome Evaluation:       Summary: severe hypoglycemia & altered mental status. Filipino speaking, requires interpretor  DATE & TIME:  5/19/2023-5/20/23 [id number]  BEHAVIOR & AGGRESSION TOOL COLOR: Green, calm/cooperative  ABNL VS/O2: VSS on RA, Q4hr vital checks  MOBILITY: Ax1 GB/W  PAIN MANAGMENT: denies  DIET: Mod carb, tolerating; family brings food from home  BOWEL/BLADDER: Incontinent of urine at times, no BM  ABNL LAB/BG: BG= 309, 261 MD notified but did not give any Insulin due to pt hx of hypoglycemia  DRAIN/DEVICES: PIV SL x2   TELEMETRY RHYTHM: NA  SKIN: WNL  TESTS/PROCEDURES: n/a  D/C DAY/GOALS/PLACE: pending stable BGMs and BP, PT recommended return home with assist  OTHER IMPORTANT INFO:  LUCRETIA following for safe disposition.

## 2023-05-23 ENCOUNTER — PATIENT OUTREACH (OUTPATIENT)
Dept: CARE COORDINATION | Facility: CLINIC | Age: 83
End: 2023-05-23
Payer: COMMERCIAL

## 2023-05-23 NOTE — PROGRESS NOTES
Clinic Care Coordination Contact  [facility]: Post-Discharge Note  SITUATION                                                      Admission:    Admission Date: 05/13/23   Reason for Admission: Altered mental status  Discharge:   Discharge Date: 05/20/23  Discharge Diagnosis: Profound hypoglycemia, resolved, Lactic acidosis, suspect related to above as well as metformin with CKD, resolved, Metabolic encephalopathy due to above, resolved, DM2 with retinopathy, Hyperglycemia, HTN (benign essential).    BACKGROUND                                                      Per hospital discharge summary and inpatient provider notes:    Ms. June Shultz is an 81 yo female with history including DM2; HTN; HLD; CKD; dementia; ACD; and glaucoma; who presents with AMS.  Please note that the history is obtained from speaking with the ER doctor, Dr. Glez and from review of the electronic medical record as there is no  available to me at this time and the patient's daughter is not here and not responding to phone call.    Patient was known well at 7 AM when the patient's daughter left for work.  However when the patient got home at around 6:30 PM patient was in a recliner and was breathing and snoring, but was unresponsive. Paramedics were called and when they arrived her glucose level was 27.  She was hypertensive in the 190s and bradycardic in the 40s.  They had difficulty getting an IV in.  She was given glucagon in route 5 minutes prior to arrival to the ER.  Subsequently brought to [facility] for further evaluation.     Patient was seen in the ER by Dr. Glez.  On initial evaluation, patient was afebrile, hypertensive up to the 190s/110s, tachycardic.  EKG showed sinus tachycardia without acute ischemic changes.  Labs notable for glucose level of 15; BMP showed sodium 134, creatinine 1.87, calcium 8.7; CBC showed white count normal, hemoglobin 9.0, platelets normal; VBG showed pH of 7.12, PCO2 87.  Chest x-ray was  "negative for acute findings.  Head CT was negative for acute findings.     In the ER, patient was given IV fluids, IV dextrose with increased in the patient's glucose level to the 500s.  Patient's mental status subsequently improved significantly.  Patient's family who are here said that she had returned to near baseline.     Per report, not change in diet or oral intake. Not clear there was recent medication changes.    ASSESSMENT      Discharge Assessment  How are you doing now that you are home?: \"This morning so was doing okay and eating okay. He diabetes was at 279 and the and the insulin at 1 in morning and 1 in afternoon.\"  How are your symptoms? (Red Flag symptoms escalate to triage hotline per guidelines): Improved  Do you feel your condition is stable enough to be safe at home until your provider visit?: Yes  Does the patient have their discharge instructions? : Yes  Does the patient have questions regarding their discharge instructions? : No  Were you started on any new medications or were there changes to any of your previous medications? : Yes  Does the patient have all of their medications?: Yes  Do you have questions regarding any of your medications? : No  Do you have all of your needed medical supplies or equipment (DME)?  (i.e. oxygen tank, CPAP, cane, etc.): Yes  Discharge follow-up appointment scheduled within 14 calendar days? : No  Is patient agreeable to assistance with scheduling? : Yes (Pt's daughterGloria will call pt's primary care clinic to schedule a PCP f/u appt for pt.)    Post-op ([facility] Only)  If the patient had a surgery or procedure, do they have any questions for a nurse?: No    PLAN                                                      Outpatient Plan:      Follow-up and recommended labs and tests  Follow up with primary care provider, Cordelia Clark, within 7 days to evaluate medication change and for hospital follow- up. The following labs/tests are recommended: basic " metabolic panel.    Future Appointments   Date Time Provider Department Center   12/7/2023 11:00 AM Dheeraj Samson MD [facility]         For any urgent concerns, please contact our 24 hour nurse triage line: [phone] ([phone])         SERENA Ramírez  [phone]  [facility]

## 2023-08-09 ENCOUNTER — APPOINTMENT (OUTPATIENT)
Dept: CT IMAGING | Facility: CLINIC | Age: 83
End: 2023-08-09
Attending: STUDENT IN AN ORGANIZED HEALTH CARE EDUCATION/TRAINING PROGRAM
Payer: COMMERCIAL

## 2023-08-09 ENCOUNTER — APPOINTMENT (OUTPATIENT)
Dept: MRI IMAGING | Facility: CLINIC | Age: 83
End: 2023-08-09
Attending: STUDENT IN AN ORGANIZED HEALTH CARE EDUCATION/TRAINING PROGRAM
Payer: COMMERCIAL

## 2023-08-09 ENCOUNTER — APPOINTMENT (OUTPATIENT)
Dept: INTERPRETER SERVICES | Facility: CLINIC | Age: 83
End: 2023-08-09
Payer: COMMERCIAL

## 2023-08-09 ENCOUNTER — HOSPITAL ENCOUNTER (OUTPATIENT)
Facility: CLINIC | Age: 83
Setting detail: OBSERVATION
Discharge: NURSING, REHAB OR LONG-TERM CARE | End: 2023-08-14
Attending: STUDENT IN AN ORGANIZED HEALTH CARE EDUCATION/TRAINING PROGRAM | Admitting: STUDENT IN AN ORGANIZED HEALTH CARE EDUCATION/TRAINING PROGRAM
Payer: COMMERCIAL

## 2023-08-09 DIAGNOSIS — S32.040A COMPRESSION FRACTURE OF L4 LUMBAR VERTEBRA, CLOSED, INITIAL ENCOUNTER (H): ICD-10-CM

## 2023-08-09 DIAGNOSIS — E11.649 SEVERE DIABETIC HYPOGLYCEMIA (H): ICD-10-CM

## 2023-08-09 DIAGNOSIS — R91.1 PULMONARY NODULE: ICD-10-CM

## 2023-08-09 DIAGNOSIS — S32.050A COMPRESSION FRACTURE OF L5 LUMBAR VERTEBRA, CLOSED, INITIAL ENCOUNTER (H): ICD-10-CM

## 2023-08-09 DIAGNOSIS — S32.020A CLOSED COMPRESSION FRACTURE OF L2 LUMBAR VERTEBRA, INITIAL ENCOUNTER (H): ICD-10-CM

## 2023-08-09 LAB
ALBUMIN SERPL BCG-MCNC: 3.7 G/DL (ref 3.5–5.2)
ALBUMIN UR-MCNC: 30 MG/DL
ALP SERPL-CCNC: 129 U/L (ref 35–104)
ALT SERPL W P-5'-P-CCNC: <5 U/L (ref 0–50)
ANION GAP SERPL CALCULATED.3IONS-SCNC: 11 MMOL/L (ref 7–15)
APAP SERPL-MCNC: <5 UG/ML (ref 10–30)
APPEARANCE UR: CLEAR
AST SERPL W P-5'-P-CCNC: 9 U/L (ref 0–45)
ATRIAL RATE - MUSE: 71 BPM
B-OH-BUTYR SERPL-SCNC: 0.3 MMOL/L
BASOPHILS # BLD AUTO: 0.1 10E3/UL (ref 0–0.2)
BASOPHILS NFR BLD AUTO: 1 %
BILIRUB SERPL-MCNC: 0.3 MG/DL
BILIRUB UR QL STRIP: NEGATIVE
BUN SERPL-MCNC: 26.3 MG/DL (ref 8–23)
CALCIUM SERPL-MCNC: 9.1 MG/DL (ref 8.8–10.2)
CHLORIDE SERPL-SCNC: 102 MMOL/L (ref 98–107)
CK SERPL-CCNC: 24 U/L (ref 26–192)
COLOR UR AUTO: ABNORMAL
CREAT SERPL-MCNC: 1.78 MG/DL (ref 0.51–0.95)
DEPRECATED HCO3 PLAS-SCNC: 24 MMOL/L (ref 22–29)
DIASTOLIC BLOOD PRESSURE - MUSE: NORMAL MMHG
EOSINOPHIL # BLD AUTO: 0.1 10E3/UL (ref 0–0.7)
EOSINOPHIL NFR BLD AUTO: 2 %
ERYTHROCYTE [DISTWIDTH] IN BLOOD BY AUTOMATED COUNT: 14.4 % (ref 10–15)
GFR SERPL CREATININE-BSD FRML MDRD: 28 ML/MIN/1.73M2
GLUCOSE BLDC GLUCOMTR-MCNC: 260 MG/DL (ref 70–99)
GLUCOSE BLDC GLUCOMTR-MCNC: 330 MG/DL (ref 70–99)
GLUCOSE SERPL-MCNC: 367 MG/DL (ref 70–99)
GLUCOSE UR STRIP-MCNC: >=1000 MG/DL
HCT VFR BLD AUTO: 27.8 % (ref 35–47)
HGB BLD-MCNC: 8.5 G/DL (ref 11.7–15.7)
HGB UR QL STRIP: NEGATIVE
IMM GRANULOCYTES # BLD: 0 10E3/UL
IMM GRANULOCYTES NFR BLD: 0 %
INTERPRETATION ECG - MUSE: NORMAL
KETONES UR STRIP-MCNC: NEGATIVE MG/DL
LACTATE SERPL-SCNC: 0.9 MMOL/L (ref 0.7–2)
LEUKOCYTE ESTERASE UR QL STRIP: NEGATIVE
LYMPHOCYTES # BLD AUTO: 1.4 10E3/UL (ref 0.8–5.3)
LYMPHOCYTES NFR BLD AUTO: 21 %
MCH RBC QN AUTO: 19.8 PG (ref 26.5–33)
MCHC RBC AUTO-ENTMCNC: 30.6 G/DL (ref 31.5–36.5)
MCV RBC AUTO: 65 FL (ref 78–100)
MONOCYTES # BLD AUTO: 0.6 10E3/UL (ref 0–1.3)
MONOCYTES NFR BLD AUTO: 9 %
NEUTROPHILS # BLD AUTO: 4.5 10E3/UL (ref 1.6–8.3)
NEUTROPHILS NFR BLD AUTO: 67 %
NITRATE UR QL: NEGATIVE
NRBC # BLD AUTO: 0 10E3/UL
NRBC BLD AUTO-RTO: 0 /100
P AXIS - MUSE: 68 DEGREES
PH UR STRIP: 6 [PH] (ref 5–7)
PLATELET # BLD AUTO: 225 10E3/UL (ref 150–450)
POTASSIUM SERPL-SCNC: 3.9 MMOL/L (ref 3.4–5.3)
PR INTERVAL - MUSE: 138 MS
PROT SERPL-MCNC: 6.8 G/DL (ref 6.4–8.3)
QRS DURATION - MUSE: 84 MS
QT - MUSE: 402 MS
QTC - MUSE: 436 MS
R AXIS - MUSE: -56 DEGREES
RBC # BLD AUTO: 4.29 10E6/UL (ref 3.8–5.2)
RBC URINE: 1 /HPF
SALICYLATES SERPL-MCNC: <0.3 MG/DL
SODIUM SERPL-SCNC: 137 MMOL/L (ref 136–145)
SP GR UR STRIP: 1.01 (ref 1–1.03)
SYSTOLIC BLOOD PRESSURE - MUSE: NORMAL MMHG
T AXIS - MUSE: 45 DEGREES
TSH SERPL DL<=0.005 MIU/L-ACNC: 1.43 UIU/ML (ref 0.3–4.2)
UROBILINOGEN UR STRIP-MCNC: NORMAL MG/DL
VENTRICULAR RATE- MUSE: 71 BPM
WBC # BLD AUTO: 6.8 10E3/UL (ref 4–11)
WBC URINE: <1 /HPF

## 2023-08-09 PROCEDURE — 250N000012 HC RX MED GY IP 250 OP 636 PS 637: Performed by: PHYSICIAN ASSISTANT

## 2023-08-09 PROCEDURE — 80179 DRUG ASSAY SALICYLATE: CPT | Performed by: STUDENT IN AN ORGANIZED HEALTH CARE EDUCATION/TRAINING PROGRAM

## 2023-08-09 PROCEDURE — 72148 MRI LUMBAR SPINE W/O DYE: CPT

## 2023-08-09 PROCEDURE — 250N000013 HC RX MED GY IP 250 OP 250 PS 637: Performed by: PHYSICIAN ASSISTANT

## 2023-08-09 PROCEDURE — 250N000011 HC RX IP 250 OP 636: Mod: JZ | Performed by: PHYSICIAN ASSISTANT

## 2023-08-09 PROCEDURE — 250N000013 HC RX MED GY IP 250 OP 250 PS 637: Performed by: STUDENT IN AN ORGANIZED HEALTH CARE EDUCATION/TRAINING PROGRAM

## 2023-08-09 PROCEDURE — 74176 CT ABD & PELVIS W/O CONTRAST: CPT

## 2023-08-09 PROCEDURE — 81001 URINALYSIS AUTO W/SCOPE: CPT | Performed by: STUDENT IN AN ORGANIZED HEALTH CARE EDUCATION/TRAINING PROGRAM

## 2023-08-09 PROCEDURE — 96361 HYDRATE IV INFUSION ADD-ON: CPT

## 2023-08-09 PROCEDURE — 85025 COMPLETE CBC W/AUTO DIFF WBC: CPT | Performed by: STUDENT IN AN ORGANIZED HEALTH CARE EDUCATION/TRAINING PROGRAM

## 2023-08-09 PROCEDURE — 99291 CRITICAL CARE FIRST HOUR: CPT | Mod: 25

## 2023-08-09 PROCEDURE — 99223 1ST HOSP IP/OBS HIGH 75: CPT | Performed by: PHYSICIAN ASSISTANT

## 2023-08-09 PROCEDURE — G0378 HOSPITAL OBSERVATION PER HR: HCPCS

## 2023-08-09 PROCEDURE — 70450 CT HEAD/BRAIN W/O DYE: CPT

## 2023-08-09 PROCEDURE — 93005 ELECTROCARDIOGRAM TRACING: CPT

## 2023-08-09 PROCEDURE — 83605 ASSAY OF LACTIC ACID: CPT | Performed by: STUDENT IN AN ORGANIZED HEALTH CARE EDUCATION/TRAINING PROGRAM

## 2023-08-09 PROCEDURE — 96374 THER/PROPH/DIAG INJ IV PUSH: CPT

## 2023-08-09 PROCEDURE — 84443 ASSAY THYROID STIM HORMONE: CPT | Performed by: STUDENT IN AN ORGANIZED HEALTH CARE EDUCATION/TRAINING PROGRAM

## 2023-08-09 PROCEDURE — 80053 COMPREHEN METABOLIC PANEL: CPT | Performed by: STUDENT IN AN ORGANIZED HEALTH CARE EDUCATION/TRAINING PROGRAM

## 2023-08-09 PROCEDURE — 80143 DRUG ASSAY ACETAMINOPHEN: CPT | Performed by: STUDENT IN AN ORGANIZED HEALTH CARE EDUCATION/TRAINING PROGRAM

## 2023-08-09 PROCEDURE — 82550 ASSAY OF CK (CPK): CPT | Performed by: PHYSICIAN ASSISTANT

## 2023-08-09 PROCEDURE — 82962 GLUCOSE BLOOD TEST: CPT

## 2023-08-09 PROCEDURE — 258N000003 HC RX IP 258 OP 636: Performed by: PHYSICIAN ASSISTANT

## 2023-08-09 PROCEDURE — 36415 COLL VENOUS BLD VENIPUNCTURE: CPT | Performed by: STUDENT IN AN ORGANIZED HEALTH CARE EDUCATION/TRAINING PROGRAM

## 2023-08-09 PROCEDURE — 82010 KETONE BODYS QUAN: CPT | Performed by: STUDENT IN AN ORGANIZED HEALTH CARE EDUCATION/TRAINING PROGRAM

## 2023-08-09 RX ORDER — NALOXONE HYDROCHLORIDE 0.4 MG/ML
0.2 INJECTION, SOLUTION INTRAMUSCULAR; INTRAVENOUS; SUBCUTANEOUS
Status: DISCONTINUED | OUTPATIENT
Start: 2023-08-09 | End: 2023-08-14 | Stop reason: HOSPADM

## 2023-08-09 RX ORDER — POLYETHYLENE GLYCOL 3350 17 G/17G
17 POWDER, FOR SOLUTION ORAL DAILY PRN
Status: DISCONTINUED | OUTPATIENT
Start: 2023-08-09 | End: 2023-08-14 | Stop reason: HOSPADM

## 2023-08-09 RX ORDER — ASPIRIN 81 MG/1
81 TABLET, CHEWABLE ORAL DAILY
Status: DISCONTINUED | OUTPATIENT
Start: 2023-08-09 | End: 2023-08-14 | Stop reason: HOSPADM

## 2023-08-09 RX ORDER — NICOTINE POLACRILEX 4 MG
15-30 LOZENGE BUCCAL
Status: DISCONTINUED | OUTPATIENT
Start: 2023-08-09 | End: 2023-08-14 | Stop reason: HOSPADM

## 2023-08-09 RX ORDER — LIDOCAINE 4 G/G
1-2 PATCH TOPICAL
Status: DISCONTINUED | OUTPATIENT
Start: 2023-08-10 | End: 2023-08-14 | Stop reason: HOSPADM

## 2023-08-09 RX ORDER — ONDANSETRON 2 MG/ML
4 INJECTION INTRAMUSCULAR; INTRAVENOUS EVERY 6 HOURS PRN
Status: DISCONTINUED | OUTPATIENT
Start: 2023-08-09 | End: 2023-08-14 | Stop reason: HOSPADM

## 2023-08-09 RX ORDER — NALOXONE HYDROCHLORIDE 0.4 MG/ML
0.4 INJECTION, SOLUTION INTRAMUSCULAR; INTRAVENOUS; SUBCUTANEOUS
Status: DISCONTINUED | OUTPATIENT
Start: 2023-08-09 | End: 2023-08-14 | Stop reason: HOSPADM

## 2023-08-09 RX ORDER — BISACODYL 5 MG
10 TABLET, DELAYED RELEASE (ENTERIC COATED) ORAL DAILY PRN
Status: DISCONTINUED | OUTPATIENT
Start: 2023-08-09 | End: 2023-08-14 | Stop reason: HOSPADM

## 2023-08-09 RX ORDER — HYDROMORPHONE HCL IN WATER/PF 6 MG/30 ML
0.2 PATIENT CONTROLLED ANALGESIA SYRINGE INTRAVENOUS
Status: DISCONTINUED | OUTPATIENT
Start: 2023-08-09 | End: 2023-08-14 | Stop reason: HOSPADM

## 2023-08-09 RX ORDER — BISACODYL 10 MG
10 SUPPOSITORY, RECTAL RECTAL DAILY PRN
Status: DISCONTINUED | OUTPATIENT
Start: 2023-08-09 | End: 2023-08-14 | Stop reason: HOSPADM

## 2023-08-09 RX ORDER — BISACODYL 5 MG
5 TABLET, DELAYED RELEASE (ENTERIC COATED) ORAL DAILY PRN
Status: DISCONTINUED | OUTPATIENT
Start: 2023-08-09 | End: 2023-08-14 | Stop reason: HOSPADM

## 2023-08-09 RX ORDER — ONDANSETRON 4 MG/1
4 TABLET, ORALLY DISINTEGRATING ORAL EVERY 6 HOURS PRN
Status: DISCONTINUED | OUTPATIENT
Start: 2023-08-09 | End: 2023-08-14 | Stop reason: HOSPADM

## 2023-08-09 RX ORDER — DEXTROSE MONOHYDRATE 25 G/50ML
25-50 INJECTION, SOLUTION INTRAVENOUS
Status: DISCONTINUED | OUTPATIENT
Start: 2023-08-09 | End: 2023-08-14 | Stop reason: HOSPADM

## 2023-08-09 RX ORDER — INSULIN LISPRO 100 [IU]/ML
2 INJECTION, SOLUTION INTRAVENOUS; SUBCUTANEOUS
Status: ON HOLD | COMMUNITY
End: 2023-09-29

## 2023-08-09 RX ORDER — LIDOCAINE 40 MG/G
CREAM TOPICAL
Status: DISCONTINUED | OUTPATIENT
Start: 2023-08-09 | End: 2023-08-14 | Stop reason: HOSPADM

## 2023-08-09 RX ORDER — ACETAMINOPHEN 500 MG
1000 TABLET ORAL ONCE
Status: COMPLETED | OUTPATIENT
Start: 2023-08-09 | End: 2023-08-09

## 2023-08-09 RX ORDER — ACETAMINOPHEN 325 MG/1
975 TABLET ORAL EVERY 8 HOURS
Status: DISCONTINUED | OUTPATIENT
Start: 2023-08-09 | End: 2023-08-14 | Stop reason: HOSPADM

## 2023-08-09 RX ORDER — SODIUM CHLORIDE 9 MG/ML
INJECTION, SOLUTION INTRAVENOUS CONTINUOUS
Status: DISCONTINUED | OUTPATIENT
Start: 2023-08-09 | End: 2023-08-10

## 2023-08-09 RX ORDER — AMOXICILLIN 250 MG
2 CAPSULE ORAL 2 TIMES DAILY
Status: DISCONTINUED | OUTPATIENT
Start: 2023-08-09 | End: 2023-08-14 | Stop reason: HOSPADM

## 2023-08-09 RX ORDER — AMOXICILLIN 250 MG
1 CAPSULE ORAL 2 TIMES DAILY
Status: DISCONTINUED | OUTPATIENT
Start: 2023-08-09 | End: 2023-08-14 | Stop reason: HOSPADM

## 2023-08-09 RX ADMIN — INSULIN ASPART 3 UNITS: 100 INJECTION, SOLUTION INTRAVENOUS; SUBCUTANEOUS at 18:01

## 2023-08-09 RX ADMIN — ACETAMINOPHEN 975 MG: 325 TABLET, FILM COATED ORAL at 16:55

## 2023-08-09 RX ADMIN — ASPIRIN 81 MG 81 MG: 81 TABLET ORAL at 16:55

## 2023-08-09 RX ADMIN — SODIUM CHLORIDE: 9 INJECTION, SOLUTION INTRAVENOUS at 16:48

## 2023-08-09 RX ADMIN — ACETAMINOPHEN 1000 MG: 500 TABLET, FILM COATED ORAL at 08:29

## 2023-08-09 RX ADMIN — SENNOSIDES AND DOCUSATE SODIUM 1 TABLET: 50; 8.6 TABLET ORAL at 22:00

## 2023-08-09 RX ADMIN — HYDROMORPHONE HYDROCHLORIDE 0.2 MG: 0.2 INJECTION, SOLUTION INTRAMUSCULAR; INTRAVENOUS; SUBCUTANEOUS at 18:40

## 2023-08-09 ASSESSMENT — ACTIVITIES OF DAILY LIVING (ADL)
ADLS_ACUITY_SCORE: 35
ADLS_ACUITY_SCORE: 33
ADLS_ACUITY_SCORE: 35
ADLS_ACUITY_SCORE: 39
ADLS_ACUITY_SCORE: 35
ADLS_ACUITY_SCORE: 39
ADLS_ACUITY_SCORE: 35

## 2023-08-09 NOTE — PHARMACY-ADMISSION MEDICATION HISTORY
Pharmacist Admission Medication History    Admission medication history is complete. The information provided in this note is only as accurate as the sources available at the time of the update.    Medication reconciliation/reorder completed by provider prior to medication history? No    Information Source(s): Family member and CareEverywhere/SureScripts via phone. Spoke with daughter Gloria 1299 8/9    Pertinent Information:   1) Gloria admits to not always giving medications daily such as lovastatin and Xyzal    Changes made to PTA medication list:  Added: None  Deleted: None  Changed: changed colace to daily and Humalog Jr to Humalog Kwikpen as shown per Surescripts    Allergies reviewed with patient and updates made in EHR: yes    Medication History Completed By: Isabel Parra PharmD 8/9/2023 1:33 PM    Prior to Admission medications    Medication Sig Last Dose Taking? Auth Provider Long Term End Date   aspirin (ASA) 81 MG chewable tablet Take 81 mg by mouth daily 8/8/2023 Yes Unknown, Entered By History     brimonidine-timolol (COMBIGAN) 0.2-0.5 % ophthalmic solution Place 1 drop into both eyes 2 times daily 8/8/2023 Yes Unknown, Entered By History     docusate sodium (COLACE) 100 MG capsule Take 100 mg by mouth daily 8/8/2023 at AM Yes Unknown, Entered By History     empagliflozin (JARDIANCE) 10 MG TABS tablet Take 10 mg by mouth daily 8/8/2023 at AM Yes Unknown, Entered By History     Ferrous Gluconate 324 (37.5 Fe) MG TABS Take 324 mg by mouth daily 8/8/2023 Yes Unknown, Entered By History     insulin glargine (LANTUS PEN) 100 UNIT/ML pen Inject 10 Units Subcutaneous every morning (before breakfast) 8/8/2023 at AM Yes Matheus Harrison, DO Yes    insulin lispro (HUMALOG KWIKPEN) 100 UNIT/ML (1 unit dial) KWIKPEN Inject 2 Units Subcutaneous 2 times daily (before meals) 8/8/2023 Yes Unknown, Entered By History Yes    levocetirizine (XYZAL) 5 MG tablet Take 5 mg by mouth every evening 8/8/2023 Yes  Unknown, Entered By History     lovastatin (MEVACOR) 20 MG tablet Take 20 mg by mouth At Bedtime 8/8/2023 Yes Unknown, Entered By History Yes    vitamin D3 (CHOLECALCIFEROL) 50 mcg (2000 units) tablet Take 1 tablet by mouth daily 8/8/2023 Yes Unknown, Entered By History     darbepoetin claudette (ARANESP, ALBUMIN FREE,) 60 MCG/0.3ML injection Inject 60 mcg Subcutaneous every 14 days ~3 weeks ago  Unknown, Entered By History

## 2023-08-09 NOTE — PROGRESS NOTES
RECEIVING UNIT ED HANDOFF REVIEW    ED Nurse Handoff Report was reviewed by: Mishel Brooke, RN on August 9, 2023 at 2:32 PM

## 2023-08-09 NOTE — H&P
[facility]  History and Physical - Hospitalist Service       Date of Admission:  8/9/2023  PRIMARY CARE PROVIDER:    Cordelia Clark    Assessment & Plan   June MYLES Do is a 82 year old female admitted on 8/9/2023.    Past medical history significant for HLP, DM2, CKD stage 3, OA, Chronic Anemia, B12 Def, Iron Def, Urinary incontinence, Memory issues, History of CVA who was registered to observation due to acute back pain.      Patient presented to the ED due to ongoing back pain and found to have had a urinary incontinence and potential altered mental status.  Discussed with emergency department physician Dr. Pringle who obtained information from the patient and her daughter.  Appears patient's been experiencing low back pain for the last several days but it significantly worsened on day of presentation.  It also appears patient had urinated in the bed though patient could not answer whether or not she lost bladder control.  The daughter had mentioned that the patient was confused but also stated that this has been occuring on-off for the last few months.      Work-up in the ED included a CMP that revealed a creatinine of 1.78 with a GFR 28, BUN of 26.3, alkaline phosphatase of 129, glucose of 367 otherwise within normal limits.  CBC with differential revealed a hemoglobin of 8.5, hematocrit of 27.8, MCV of 65, MCH of 19.8, MCHC of 30.6 otherwise within normal limits.  TSH level was within normal limits at 1.43.  Lactic acid level is within normal limits at 0.9.  Quantitative ketones were within normal limits at 0.3.  Urine analysis was unremarkable except for greater than 1000 glucose and protein albumin of 30.  Salicylate level was less than 0.3.  EKG showed sinus rhythm with frequent PVCs and left axis deviation.  Head CT without contrast was negative for acute intracranial process but did state there was an unchanged chronic left basal ganglia/corona radiata region infarct and brain  atrophy and presumed chronic small vessel ischemic changes.  Abdomen/pelvis CT without contrast was negative for acute findings and incidentally found age-indeterminate endplate fractures of L2, L4 and L5 (favored remote) and small bilateral pulmonary nodules.  A lumbar spine MRI has been ordered and yet to be completed.    Acute back pain  Incidental Subacute L2, L4, L5 compression fractures  - Neurosurgery consult requested.    - Follow up on Lumbar spine MRI.    - Schedule APAP 975 mg every 8 hours.    - PRN PO oxycodone and PRN IV dilaudid available.    - PT consult requested.    - SW/CM consult requested.      Urinary incontinence  Possible loss of bladder control  *Concern whether patient lost bladder control as such Lumbar spine MRI was ordered in the ED.    - Monitor closely.    - Seizure precautions.    - Neuro checks every 8 hours.    - Check total CK.      Altered mental status  Memory issues  *Unclear if this is progressive memory issues or acute change.  Work-up thus far has been negative.    - Monitor.    - IV fluids with NS at 75 ml/hr.    - Recommend making referral to outpatient Neurology.      HLP  - Hold PTA Mevacor 20 mg at bedtime.  Resume at discharge.      DM2  A1c of 14.5% from 5/13/23.    - Resumed on PTA Lantus but reduced dose to 8 units (PTA dose of 10 units every morning).    - Hold PTA Jardiance 10 mg/d.  Resume at discharge.    - Medium intensity sliding scale insulin.    - Glucose     CKD stage 3  Creatinine baseline ranges between 1.55-1.9.   - IV fluids as above.    - BMP tomorrow morning.       OA  - Pain management as above.      Chronic Anemia (microcytic hypochromic)  B12 Def  Iron Def  HGB ranges between 8.5-9.1.    - CBC ordered for the morning.   - Hold PTA supplements and resume at discharge.       Incidental pulmonary nodule  - Recommend follow up with pulmonary nodule clinic (referral will need to be made at discharge).      History of CVA   Unchanged chronic left basal  ganglia/corona radiata region infarct noted on head CT.    - Resumed on PTA ASA 81 mg/d.    - Hold PTA statin and resume at discharge.        Clinically Significant Risk Factors Present on Admission                # Drug Induced Platelet Defect: home medication list includes an antiplatelet medication       # DMII: A1C = 14.5 % (Ref range: <5.7 %) within past 6 months                 Diet: Combination diet.    DVT Prophylaxis: Pneumatic Compression Devices  Sotelo Catheter: Not present  Lines: None     Cardiac Monitoring: None  Code Status: FULL CODE confirmed with the patient's daughter.           Disposition Plan   Observation status.  Anticipate patient will likely be able to return home following assessment by consulting services and as long as pain is adequately controlled and patient has returned to baseline.    The patient's care was discussed with the Patient, Patient's Family, and Dr. Pringle .    The patient has been discussed with Dr. Wilson, who agrees with the assessment and plan at this time.    Romaine Null PA-C  [facility]  Securely message with the Vocera Web Console (learn more here)  Text page via [employer] Paging/Directory    ______________________________________________________________________    Chief Complaint   Back pain, altered mental status     History is obtained from the patient and EMR.      History of Present Illness   June MYLES Do is a 82 year old female with a past medical history significant for HLP, DM2, CKD stage 3, OA, Chronic Anemia, B12 Def, Iron Def, Urinary incontinence, Memory issues, History of CVA who was registered to observation due to acute back pain.      Patient presented to the ED due to ongoing back pain and found to have had a urinary incontinence and potential altered mental status.  Discussed with emergency department physician Dr. Pringle who obtained information from the patient and her daughter.  Appears patient's been experiencing low  back pain for the last several days but it significantly worsened on day of presentation.  It also appears patient had urinated in the bed though patient could not answer whether or not she lost bladder control.  The daughter had mentioned that the patient was confused but also stated that this has been occuring on-off for the last few months.      Work-up in the ED included a CMP that revealed a creatinine of 1.78 with a GFR 28, BUN of 26.3, alkaline phosphatase of 129, glucose of 367 otherwise within normal limits.  CBC with differential revealed a hemoglobin of 8.5, hematocrit of 27.8, MCV of 65, MCH of 19.8, MCHC of 30.6 otherwise within normal limits.  TSH level was within normal limits at 1.43.  Lactic acid level is within normal limits at 0.9.  Quantitative ketones were within normal limits at 0.3.  Urine analysis was unremarkable except for greater than 1000 glucose and protein albumin of 30.  Salicylate level was less than 0.3.  EKG showed sinus rhythm with frequent PVCs and left axis deviation.  Head CT without contrast was negative for acute intracranial process but did state there was an unchanged chronic left basal ganglia/corona radiata region infarct and brain atrophy and presumed chronic small vessel ischemic changes.  Abdomen/pelvis CT without contrast was negative for acute findings and incidentally found age-indeterminate endplate fractures of L2, L4 and L5 (favored remote) and small bilateral pulmonary nodules.  A lumbar spine MRI has been ordered and yet to be completed.    Patient was seen in the ED where she was resting in bed comfortably upon arrival with the lights turned off.  Patient was interviewed and assessed with the aid of a professional Telugu .  Attempted to review medical history and home medications but patient stated she is unsure of what medical issues she has or any of her home medications.    When asked why patient was brought into the ED she was unable to  answer this question.  She indicated that she was asleep and awoke and found herself in the ED.  Review of system questions were asked and patient denied having any symptoms.    Patient's daughter was called after assessment of the patient.  She indicated the patient's had a worsening memory issues that have been intermittent for the last several months.  She mentioned that she complained of her back hurting and was found to have urinated in bed.  Discussed CODE STATUS with the patient's daughter and she like to be full code.    Past Medical History    I have reviewed this patient's medical history and updated it with pertinent information if needed.   Past Medical History:   Diagnosis Date    Diabetes (H)    HLP, DM2, CKD stage 3, OA, Chronic Anemia, B12 Def, Iron Def, Urinary incontinence, Memory issues, History of CVA    Prior to Admission Medications   Prior to Admission Medications   Prescriptions Last Dose Informant Patient Reported? Taking?   Ferrous Gluconate 324 (37.5 Fe) MG TABS   Yes No   Sig: Take 324 mg by mouth daily   aspirin (ASA) 81 MG chewable tablet   Yes No   Sig: Take 81 mg by mouth daily   brimonidine-timolol (COMBIGAN) 0.2-0.5 % ophthalmic solution   Yes No   Sig: Place 1 drop into both eyes 2 times daily   darbepoetin claudette (ARANESP, ALBUMIN FREE,) 60 MCG/0.3ML injection   Yes No   Sig: Inject 60 mcg Subcutaneous every 14 days   docusate sodium (COLACE) 100 MG capsule   Yes No   Sig: Take 100 mg by mouth 2 times daily   empagliflozin (JARDIANCE) 10 MG TABS tablet   Yes No   Sig: Take 10 mg by mouth daily   insulin glargine (LANTUS PEN) 100 UNIT/ML pen   No No   Sig: Inject 10 Units Subcutaneous every morning (before breakfast)   insulin lispro (HUMALOG ELSY KWIKPEN) 100 UNIT/ML (0.5 unit dial) KWIKPEN   No No   Sig: Inject 2 Units Subcutaneous 2 times daily (before meals)   levocetirizine (XYZAL) 5 MG tablet   Yes No   Sig: Take 5 mg by mouth every evening   lovastatin (MEVACOR) 20 MG tablet    Yes No   Sig: Take 20 mg by mouth At Bedtime   vitamin D3 (CHOLECALCIFEROL) 50 mcg (2000 units) tablet   Yes No   Sig: Take 1 tablet by mouth daily      Facility-Administered Medications: None     Allergies   No Known Allergies    Physical Exam   BP (!) 155/64   Pulse 54   Temp 98.3  F (36.8  C) (Oral)   Resp 14   SpO2 97%     Constitutional: Asleep but awakened easily.  She remained awake throughout assessment and cooperative.  She was in no distress.    ENT: Normocephalic, without obvious abnormality, atraumatic, oral pharynx with moist mucus membranes, tonsils without erythema or exudates.  Eyes extra occular movements intact.  Normal sclera.    Neck: Supple, symmetrical, trachea midline, no adenopathy.  Pulmonary: No increased work of breathing, fair air exchange, clear to auscultation bilaterally, no crackles or wheezing.  Cardiovascular: Regular rate and rhythm, normal S1 and S2, no S3 or S4, and no murmur noted.  GI: Normal bowel sounds, soft, non-distended, non-tender.    Skin/Integumen: Visualized skin appeared clear.  Neuro: CN II-XII grossly intact.  Lower extremities strength, coordination and sensation intact bilaterally.   strength of upper extremities is even and intact though remained of upper extremities strength seems weak.    Psych:  Oriented to self. Normal affect.  Extremities: No lower extremity edema noted, and calves are non-tender to palpation bilaterally.     Medical Decision Making       Please see A&P for additional details of medical decision making.  Greater than 75 MINUTES SPENT BY ME on the date of service doing chart review, history, exam, documentation & further activities per the note.         Data   Data reviewed today: I reviewed all medications, new labs and imaging results over the last 24 hours. I personally reviewed the EKG tracing showing sinus rhythm with PVCs .      I have personally reviewed the following data over the past 24 hrs:    6.8  \   8.5 (L)   / 225      137 102 26.3 (H) /  367 (H)   3.9 24 1.78 (H) \     ALT: <5 AST: 9 AP: 129 (H) TBILI: 0.3   ALB: 3.7 TOT PROTEIN: 6.8 LIPASE: N/A     TSH: 1.43 T4: N/A A1C: N/A     Procal: N/A CRP: N/A Lactic Acid: 0.9         Imaging results reviewed over the past 24 hrs:   Recent Results (from the past 24 hour(s))   CT Abdomen Pelvis w/o Contrast    Narrative    CT ABDOMEN PELVIS W/O CONTRAST 8/9/2023 9:07 AM    CLINICAL HISTORY: Abdominal pain.  abdominal pain periumbilical   TECHNIQUE: CT scan of the abdomen and pelvis was performed without IV  contrast. Multiplanar reformats were obtained. Dose reduction  techniques were used.  CONTRAST: None.    COMPARISON: None.    FINDINGS:   LOWER CHEST: Heart size is within normal limits. No pericardial  effusion. Partially imaged coronary artery calcifications. There is a  4 mm solid pulmonary nodule of the right middle lobe (series 3, image  7). Additional 3 mm solid nodule in the lingula (series 3, image 10).    HEPATOBILIARY: No significant mass or bile duct dilatation. No  calcified gallstones.     PANCREAS: No significant mass, duct dilatation, or inflammatory  change.    SPLEEN: Normal size.    ADRENAL GLANDS: No significant nodules.    KIDNEYS/BLADDER: No significant mass, stones, or hydronephrosis. Left  greater than right mild renal atrophy. Distended urinary bladder.    BOWEL: No obstruction or inflammatory change. Moderate colonic stool  burden.    VASCULATURE: No abdominal aortic aneurysm. Moderate burden of calcific  atherosclerosis.    PELVIC ORGANS: No adnexal mass.    OTHER: No free air or free fluid.    MUSCULOSKELETAL: Multilevel degenerative changes of the spine.  Age-indeterminate superior endplate fracture at L5 and inferior  endplate fracture at L2 and L4 with mild height loss, favoring remote.      Impression    IMPRESSION:   1.  No acute findings within the abdomen or pelvis by noncontrast CT.  2.  Age-indeterminate endplate fractures at L2, L4, and L5,  favored  remote.  3.  Small bilateral pulmonary nodules. CT follow-up recommendations  below.    REFERENCE:  Guidelines for Management of Incidental Pulmonary Nodules Detected on  CT Images: From the Fleischner Society 2017.   Guidelines apply to incidental nodules in patients who are 35 years or  older.  Guidelines do not apply to lung cancer screening, patients with  immunosuppression, or patients with known primary cancer.    MULTIPLE NODULES  Nodule size <6 mm  Low-risk patients: No follow-up needed.  High-risk patients: Optional follow-up at 12 months.    TERRY ROBLES MD         SYSTEM ID:  [id number]   Head CT w/o contrast    Narrative    CT SCAN OF THE HEAD WITHOUT CONTRAST   8/9/2023 9:07 AM     HISTORY: Altered mental status.    TECHNIQUE:  Axial images of the head and coronal reformations without  IV contrast material. Radiation dose for this scan was reduced using  automated exposure control, adjustment of the mA and/or kV according  to patient size, or iterative reconstruction technique.    COMPARISON: CT head 5/13/2023.    FINDINGS: Unchanged chronic infarct in the left basal ganglia/corona  radiata region. Elsewhere, mild patchy nonspecific hypoattenuation of  the cerebral white matter appears similar to prior, likely  representing chronic small vessel ischemic changes. Mild to moderate  generalized cerebral volume loss and mild generalized cerebral volume  loss. No CT findings of large transcortical acute or subacute infarct.  No acute intracranial hemorrhage, extra-axial fluid collection, or  mass effect. The ventricles are normal in size and configuration.  Scattered intracranial atherosclerotic calcifications are noted.    The visualized portions of the sinuses and mastoids appear normal. The  bony calvarium and bones of the skull base appear intact.       Impression    IMPRESSION:  1. No CT findings of acute intracranial process.  2. Unchanged chronic left basal ganglia/corona radiata region  infarct.  3. Brain atrophy and presumed chronic small vessel ischemic changes,  as described.

## 2023-08-09 NOTE — ED NOTES
[facility]  ED Nurse Handoff Report    ED Chief complaint: Generalized Weakness and Altered Mental Status      ED Diagnosis:   Final diagnoses:   Pulmonary nodule   Closed compression fracture of L2 lumbar vertebra, initial encounter (H)   Compression fracture of L4 lumbar vertebra, closed, initial encounter (H)   Compression fracture of L5 lumbar vertebra, closed, initial encounter (H)       Code Status: Full Code    Allergies: No Known Allergies    Patient Story: Pt is a 82 year old female who presents to the ED with generalized weakness and altered mental status. The daughter of the patient reports that the patient has been experiencing low back pain for the last few days but today is significantly worse. She proclaims that this back pain is exacerbated by movement. She states that this morning there was urine in the patient's bed. She reports that the loss of bladder control is new. She states that the patient is confused today but this has been an intermittent issue for months. She denies the patient experiencing fever, trauma, or history of back surgeries.     The patient reports that she is here today because she fainted at home and has abdominal pain that radiates to her back. She states that this abdominal pain started yesterday morning. She reports that this morning when she urinated on herself she was unaware that she was urinating.    Focused Assessment:  Pt is awake, alert but slightly confused to place, and time    Treatments and/or interventions provided: Labs, CT, MRI    Patient's response to treatments and/or interventions: Pt tolerated well.     To be done/followed up on inpatient unit:  MRI pending     Does this patient have any cognitive concerns?: Forgetful    Activity level - Baseline/Home:  Independent  Activity Level - Current:   Unknown    Patient's Preferred language: Icelandic   Needed?: Yes    Isolation: None  Infection: Not Applicable  Patient tested for  COVID 19 prior to admission: NO  Bariatric?: No    Vital Signs:   Vitals:    08/09/23 1124 08/09/23 1139 08/09/23 1154 08/09/23 1201   BP:       Pulse:       Resp:       Temp:       TempSrc:       SpO2: 97% 98% 99% 97%       Cardiac Rhythm:     Was the PSS-3 completed:   No   What interventions are required if any?               Family Comments: Step daughter come briefly and left   OBS brochure/video discussed/provided to patient/family: No              Name of person given brochure if not patient:               Relationship to patient:     For the majority of the shift this patient's behavior was Green.   Behavioral interventions performed were .    ED NURSE PHONE NUMBER: *[phone]

## 2023-08-09 NOTE — ED PROVIDER NOTES
History     Chief Complaint:  Generalized Weakness and Altered Mental Status       The history is provided by the patient. The history is limited by a language barrier. A  was used.      June MYLES Do is a 82 year old female with history of type 2 diabetes mellitus, hypoglycemia, CKD stage 3 who presents to the ED with generalized weakness and altered mental status. The daughter of the patient reports that the patient has been experiencing low back pain for the last few days but today is significantly worse. She proclaims that this back pain is exacerbated by movement. She states that this morning there was urine in the patient's bed. She reports that the loss of bladder control is new. She states that the patient is confused today but this has been an intermittent issue for months. She denies the patient experiencing fever, trauma, or history of back surgeries. The patient reports that she is here today because she fainted at home and has abdominal pain that radiates to her back. She states that this abdominal pain started yesterday morning. She reports that this morning when she urinated on herself she was unaware that she was urinating.  She denies nausea, vomiting, bowel problems, urinary problems, fever, numbness, tingling, chest pain, fall, difficulty breathing, or history of back pain.    Independent Historian:   Daughter - They report supplemental history.    Review of External Notes:   I reviewed discharge summary from 05/10/23 of diabetic complications.       Medications:    Aspirin 81 MG   Carvedilol  Cholecalciferol   Docusate sodium   Lantus Solostar pen   Levocetirizine  Losartan  Lovastatin  Metformin   Nifedipine XL   Empagliflozin  Ferrous gluconate   Darbepoetin claudette   Cyanocobalamin    Past Medical History:    Severe diabetic hypoglycemia (H)  Vitamin B12 deficiency   Microcytic hypochromic anemia   Urinary incontinence   Type 2 diabetes mellitus with complication   Memory  loss   Stage 3 chronic kidney disease   Suspected glaucoma of both eyes  Primary osteoarthritis involving multiple joints   Benign neoplasm of colon   Essential hypertension     Physical Exam   Patient Vitals for the past 24 hrs:   BP Temp Temp src Pulse Resp SpO2   08/09/23 1201 -- -- -- -- -- 97 %   08/09/23 1154 -- -- -- -- -- 99 %   08/09/23 1139 -- -- -- -- -- 98 %   08/09/23 1124 -- -- -- -- -- 97 %   08/09/23 1109 (!) 155/64 -- -- 54 -- --   08/09/23 0824 (!) 169/66 -- -- -- -- 97 %   08/09/23 0752 -- -- -- -- -- 98 %   08/09/23 0742 -- -- -- -- -- 95 %   08/09/23 0736 -- 98.3  F (36.8  C) Oral 66 14 96 %   08/09/23 0730 (!) 185/75 -- -- -- -- 98 %        Physical Exam  GENERAL: Patient well-appearing.   HEAD: Atraumatic.  NECK: No rigidity  CV: RRR, no murmurs rubs or gallops  PULM: CTAB with good aeration; no retractions, rales, rhonchi, or wheezing  ABD: Soft, nontender, nondistended, no guarding  DERM: No rash. Skin warm and dry  EXTREMITY: Moving all extremities without difficulty. No calf tenderness or peripheral edema  VASCULAR: Symmetric pulses bilaterally  BACK: Perispinal tenderness in low back no skin changes or midline tenderness   NEURO: CN 2-12 fully intact. Strength 5/5 throughout.  RECTAL: Good rectal tone.     Emergency Department Course   ECG  ECG results from 08/09/23   EKG 12-lead, tracing only     Value    Systolic Blood Pressure     Diastolic Blood Pressure     Ventricular Rate 71    Atrial Rate 71    WV Interval 138    QRS Duration 84        QTc 436    P Axis 68    R AXIS -56    T Axis 45    Interpretation ECG      Sinus rhythm with frequent Premature ventricular complexes  Left axis deviation  Cannot rule out Anterior infarct (cited on or before 13-MAY-2023)  Abnormal ECG  When compared with ECG of 13-MAY-2023 20:01, and 02/28/23  Premature ventricular complexes are now Present  Questionable change in initial forces of Anterior leads  ST no longer depressed in Inferior leads  QT  has shortened  Confirmed by GENERATED REPORT, COMPUTER ([id number]),  Carla Arroyo ([id number]) on 8/9/2023 7:46:52 AM         Imaging:  Head CT w/o contrast   Preliminary Result   IMPRESSION:   1. No CT findings of acute intracranial process.   2. 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   3. Brain atrophy and presumed chronic small vessel ischemic changes,   as described.      CT Abdomen Pelvis w/o Contrast   Final Result   IMPRESSION:    1.  No acute findings within the abdomen or pelvis by noncontrast CT.   2.  Age-indeterminate endplate fractures at L2, L4, and L5, favored   remote.   3.  Small bilateral pulmonary nodules. CT follow-up recommendations   below.      REFERENCE:   Guidelines for Management of Incidental Pulmonary Nodules Detected on   CT Images: From the Fleischner Society 2017.    Guidelines apply to incidental nodules in patients who are 35 years or   older.   Guidelines do not apply to lung cancer screening, patients with   immunosuppression, or patients with known primary cancer.      MULTIPLE NODULES   Nodule size <6 mm   Low-risk patients: No follow-up needed.   High-risk patients: Optional follow-up at 12 months.      TERRY ROBLES MD            SYSTEM ID:  [id number]      Lumbar spine MRI w/o contrast    (Results Pending)      Report per radiology    Laboratory:  Labs Ordered and Resulted from Time of ED Arrival to Time of ED Departure   COMPREHENSIVE METABOLIC PANEL - Abnormal       Result Value    Sodium 137      Potassium 3.9      Chloride 102      Carbon Dioxide (CO2) 24      Anion Gap 11      Urea Nitrogen 26.3 (*)     Creatinine 1.78 (*)     Calcium 9.1      Glucose 367 (*)     Alkaline Phosphatase 129 (*)     AST 9      ALT <5      Protein Total 6.8      Albumin 3.7      Bilirubin Total 0.3      GFR Estimate 28 (*)    ROUTINE UA WITH MICROSCOPIC REFLEX TO CULTURE - Abnormal    Color Urine Straw      Appearance Urine Clear      Glucose Urine >=1000 (*)      Bilirubin Urine Negative      Ketones Urine Negative      Specific Gravity Urine 1.011      Blood Urine Negative      pH Urine 6.0      Protein Albumin Urine 30 (*)     Urobilinogen Urine Normal      Nitrite Urine Negative      Leukocyte Esterase Urine Negative      RBC Urine 1      WBC Urine <1     ACETAMINOPHEN LEVEL - Abnormal    Acetaminophen <5.0 (*)    CBC WITH PLATELETS AND DIFFERENTIAL - Abnormal    WBC Count 6.8      RBC Count 4.29      Hemoglobin 8.5 (*)     Hematocrit 27.8 (*)     MCV 65 (*)     MCH 19.8 (*)     MCHC 30.6 (*)     RDW 14.4      Platelet Count 225      % Neutrophils 67      % Lymphocytes 21      % Monocytes 9      % Eosinophils 2      % Basophils 1      % Immature Granulocytes 0      NRBCs per 100 WBC 0      Absolute Neutrophils 4.5      Absolute Lymphocytes 1.4      Absolute Monocytes 0.6      Absolute Eosinophils 0.1      Absolute Basophils 0.1      Absolute Immature Granulocytes 0.0      Absolute NRBCs 0.0     TSH WITH FREE T4 REFLEX - Normal    TSH 1.43     KETONE BETA-HYDROXYBUTYRATE QUANTITATIVE, RAPID - Normal    Ketone (Beta-Hydroxybutyrate) Quantitative 0.30     LACTIC ACID WHOLE BLOOD - Normal    Lactic Acid 0.9     SALICYLATE LEVEL - Normal    Salicylate <0.3        Emergency Department Course & Assessments:           Interventions:  Medications   acetaminophen (TYLENOL) tablet 1,000 mg (1,000 mg Oral $Given 8/9/23 0829)        Independent Interpretation (X-rays, CTs, rhythm strip):  CT head no bleed    Assessments/Consultations/Discussion of Management or Tests:  ED Course as of 08/09/23 1333   Wed Aug 09, 2023   0735 I spoke with daughter of the patient.   0742 I obtained history and examined the patient as noted above.    0759 I performed a rectal exam on the patient with a female co-worker at my side.    1150 I spoke with Alexander Null PA-C, hospitalist, who agreed to admit the patient.        Social Determinants of Health affecting care:   None    Disposition:  The patient was  admitted to the hospital under the care of Alexander Null PA-C.     Impression & Plan        Medical Decision Making:  Symptoms most consistent with back pain secondary to compression fractures in the lumbar spine.    Vital signs notable for hypertension which she has history of.    Neurologic exam is otherwise grossly unremarkable.    Chronic conditions complicating-diabetes.    Back Pain DDx: dislocation, neurovascular injury, compartment syndrome, however evaluation not consistent with these etiologies.    There was also initial concern for altered mental status.  I discussed this with patient's daughter over the phone and she states this is been waxing waning for months.      Patient was able to answer questions well with the help of  services.  Do not see focal deficits to suggest stroke.    Started broad workup due to the reported intermittent altered mental status and that workup was grossly unremarkable.  Creatinine is slightly elevated 1.78 but she has waxed and waned at this level.  Glucose is elevated 367 but do not think this is HHS.  Patient has mild anemia 8.5 which is actually proved from prior.    CT abdomen/pelvis showing pulmonary nodules which was listed in her diagnoses.  Otherwise no acute intra-abdominal process only the compression fractures.    CT head unremarkable.    Due to the reported potential bladder incontinence, ordered MRI.  Seems less likely a good cord syndrome as she has good rectal tone.  Her strength is equal in bilateral lower extremities and she denies any numbness or tingling.      Patient unable to sit due to the pain therefore consult hospitalist team.  While lying flat she appears comfortable however.    MRI is pending upon transfer of care to hospitalist team.    Critical Care time:  was 0 minutes for this patient excluding procedures.    Diagnosis:    ICD-10-CM    1. Pulmonary nodule  R91.1       2. Closed compression fracture of L2 lumbar vertebra, initial  encounter (H)  S32.020A       3. Compression fracture of L4 lumbar vertebra, closed, initial encounter (H)  S32.040A       4. Compression fracture of L5 lumbar vertebra, closed, initial encounter (H)  S32.050A            Discharge Medications:  New Prescriptions    No medications on file          Scribe Disclosure:  Kell SOUSA, am serving as a scribe at 9:05 AM on 8/9/2023 to document services personally performed by John Pringle MD based on my observations and the provider's statements to me.     8/9/2023   John Pringle MD Foss, Kevin, MD  08/09/23 8875

## 2023-08-09 NOTE — ED NOTES
Bed: ED26  Expected date:   Expected time:   Means of arrival:   Comments:  511  82 F uti/confused  [id number]

## 2023-08-09 NOTE — PROGRESS NOTES
Observation Goals:    -diagnostic tests and consults completed and resulted - Not met  -vital signs normal or at patient baseline - Met  -tolerating oral intake to maintain hydration - yet to assess   -adequate pain control on oral analgesics - In progress  -returns to baseline functional status - Not Met  -safe disposition plan has been identified - Not Met    Nurse to notify provider when observation goals have been met and patient is ready for discharge.

## 2023-08-10 ENCOUNTER — APPOINTMENT (OUTPATIENT)
Dept: PHYSICAL THERAPY | Facility: CLINIC | Age: 83
End: 2023-08-10
Attending: PHYSICIAN ASSISTANT
Payer: COMMERCIAL

## 2023-08-10 LAB
ANION GAP SERPL CALCULATED.3IONS-SCNC: 11 MMOL/L (ref 7–15)
BUN SERPL-MCNC: 36.4 MG/DL (ref 8–23)
CALCIUM SERPL-MCNC: 8.6 MG/DL (ref 8.8–10.2)
CHLORIDE SERPL-SCNC: 105 MMOL/L (ref 98–107)
CREAT SERPL-MCNC: 1.93 MG/DL (ref 0.51–0.95)
DEPRECATED HCO3 PLAS-SCNC: 21 MMOL/L (ref 22–29)
ERYTHROCYTE [DISTWIDTH] IN BLOOD BY AUTOMATED COUNT: 14.5 % (ref 10–15)
GFR SERPL CREATININE-BSD FRML MDRD: 26 ML/MIN/1.73M2
GLUCOSE BLDC GLUCOMTR-MCNC: 105 MG/DL (ref 70–99)
GLUCOSE BLDC GLUCOMTR-MCNC: 134 MG/DL (ref 70–99)
GLUCOSE BLDC GLUCOMTR-MCNC: 252 MG/DL (ref 70–99)
GLUCOSE BLDC GLUCOMTR-MCNC: 301 MG/DL (ref 70–99)
GLUCOSE BLDC GLUCOMTR-MCNC: 351 MG/DL (ref 70–99)
GLUCOSE SERPL-MCNC: 290 MG/DL (ref 70–99)
HBA1C MFR BLD: 12.9 %
HCT VFR BLD AUTO: 26 % (ref 35–47)
HGB BLD-MCNC: 7.7 G/DL (ref 11.7–15.7)
MCH RBC QN AUTO: 19.2 PG (ref 26.5–33)
MCHC RBC AUTO-ENTMCNC: 29.6 G/DL (ref 31.5–36.5)
MCV RBC AUTO: 65 FL (ref 78–100)
PLATELET # BLD AUTO: 222 10E3/UL (ref 150–450)
POTASSIUM SERPL-SCNC: 4.2 MMOL/L (ref 3.4–5.3)
RBC # BLD AUTO: 4.01 10E6/UL (ref 3.8–5.2)
SODIUM SERPL-SCNC: 137 MMOL/L (ref 136–145)
WBC # BLD AUTO: 4.9 10E3/UL (ref 4–11)

## 2023-08-10 PROCEDURE — 82962 GLUCOSE BLOOD TEST: CPT

## 2023-08-10 PROCEDURE — 258N000003 HC RX IP 258 OP 636: Performed by: STUDENT IN AN ORGANIZED HEALTH CARE EDUCATION/TRAINING PROGRAM

## 2023-08-10 PROCEDURE — 99232 SBSQ HOSP IP/OBS MODERATE 35: CPT | Performed by: STUDENT IN AN ORGANIZED HEALTH CARE EDUCATION/TRAINING PROGRAM

## 2023-08-10 PROCEDURE — 99203 OFFICE O/P NEW LOW 30 MIN: CPT | Performed by: PHYSICIAN ASSISTANT

## 2023-08-10 PROCEDURE — 80048 BASIC METABOLIC PNL TOTAL CA: CPT | Performed by: PHYSICIAN ASSISTANT

## 2023-08-10 PROCEDURE — 97116 GAIT TRAINING THERAPY: CPT | Mod: GP

## 2023-08-10 PROCEDURE — 96361 HYDRATE IV INFUSION ADD-ON: CPT

## 2023-08-10 PROCEDURE — 250N000013 HC RX MED GY IP 250 OP 250 PS 637: Performed by: PHYSICIAN ASSISTANT

## 2023-08-10 PROCEDURE — 36415 COLL VENOUS BLD VENIPUNCTURE: CPT | Performed by: PHYSICIAN ASSISTANT

## 2023-08-10 PROCEDURE — 250N000013 HC RX MED GY IP 250 OP 250 PS 637: Performed by: STUDENT IN AN ORGANIZED HEALTH CARE EDUCATION/TRAINING PROGRAM

## 2023-08-10 PROCEDURE — 83036 HEMOGLOBIN GLYCOSYLATED A1C: CPT | Performed by: STUDENT IN AN ORGANIZED HEALTH CARE EDUCATION/TRAINING PROGRAM

## 2023-08-10 PROCEDURE — 97161 PT EVAL LOW COMPLEX 20 MIN: CPT | Mod: GP

## 2023-08-10 PROCEDURE — 97530 THERAPEUTIC ACTIVITIES: CPT | Mod: GP

## 2023-08-10 PROCEDURE — L1499 SPINAL ORTHOSIS NOS: HCPCS

## 2023-08-10 PROCEDURE — 258N000003 HC RX IP 258 OP 636: Performed by: PHYSICIAN ASSISTANT

## 2023-08-10 PROCEDURE — 250N000012 HC RX MED GY IP 250 OP 636 PS 637: Performed by: STUDENT IN AN ORGANIZED HEALTH CARE EDUCATION/TRAINING PROGRAM

## 2023-08-10 PROCEDURE — G0378 HOSPITAL OBSERVATION PER HR: HCPCS

## 2023-08-10 PROCEDURE — 85027 COMPLETE CBC AUTOMATED: CPT | Performed by: PHYSICIAN ASSISTANT

## 2023-08-10 RX ORDER — SODIUM CHLORIDE 9 MG/ML
INJECTION, SOLUTION INTRAVENOUS CONTINUOUS
Status: ACTIVE | OUTPATIENT
Start: 2023-08-10 | End: 2023-08-11

## 2023-08-10 RX ORDER — PRAVASTATIN SODIUM 20 MG
20 TABLET ORAL AT BEDTIME
Status: DISCONTINUED | OUTPATIENT
Start: 2023-08-10 | End: 2023-08-14 | Stop reason: HOSPADM

## 2023-08-10 RX ADMIN — LIDOCAINE 2 PATCH: 560 PATCH PERCUTANEOUS; TOPICAL; TRANSDERMAL at 08:51

## 2023-08-10 RX ADMIN — SODIUM CHLORIDE: 9 INJECTION, SOLUTION INTRAVENOUS at 05:36

## 2023-08-10 RX ADMIN — INSULIN ASPART 5 UNITS: 100 INJECTION, SOLUTION INTRAVENOUS; SUBCUTANEOUS at 12:15

## 2023-08-10 RX ADMIN — PRAVASTATIN SODIUM 20 MG: 20 TABLET ORAL at 21:59

## 2023-08-10 RX ADMIN — ASPIRIN 81 MG 81 MG: 81 TABLET ORAL at 08:50

## 2023-08-10 RX ADMIN — EMPAGLIFLOZIN 10 MG: 10 TABLET, FILM COATED ORAL at 16:35

## 2023-08-10 RX ADMIN — OXYCODONE HYDROCHLORIDE 2.5 MG: 5 TABLET ORAL at 17:42

## 2023-08-10 RX ADMIN — ACETAMINOPHEN 975 MG: 325 TABLET, FILM COATED ORAL at 16:35

## 2023-08-10 RX ADMIN — SODIUM CHLORIDE: 9 INJECTION, SOLUTION INTRAVENOUS at 17:39

## 2023-08-10 RX ADMIN — ACETAMINOPHEN 975 MG: 325 TABLET, FILM COATED ORAL at 08:50

## 2023-08-10 RX ADMIN — INSULIN ASPART 4 UNITS: 100 INJECTION, SOLUTION INTRAVENOUS; SUBCUTANEOUS at 08:50

## 2023-08-10 RX ADMIN — SENNOSIDES AND DOCUSATE SODIUM 1 TABLET: 50; 8.6 TABLET ORAL at 08:51

## 2023-08-10 RX ADMIN — SENNOSIDES AND DOCUSATE SODIUM 1 TABLET: 50; 8.6 TABLET ORAL at 21:59

## 2023-08-10 RX ADMIN — OXYCODONE HYDROCHLORIDE 2.5 MG: 5 TABLET ORAL at 21:59

## 2023-08-10 RX ADMIN — INSULIN GLARGINE 10 UNITS: 100 INJECTION, SOLUTION SUBCUTANEOUS at 10:24

## 2023-08-10 RX ADMIN — ACETAMINOPHEN 975 MG: 325 TABLET, FILM COATED ORAL at 00:04

## 2023-08-10 ASSESSMENT — ACTIVITIES OF DAILY LIVING (ADL)
ADLS_ACUITY_SCORE: 42
ADLS_ACUITY_SCORE: 39
ADLS_ACUITY_SCORE: 45
ADLS_ACUITY_SCORE: 39
ADLS_ACUITY_SCORE: 42
ADLS_ACUITY_SCORE: 42
ADLS_ACUITY_SCORE: 41
ADLS_ACUITY_SCORE: 42
DEPENDENT_IADLS:: CLEANING;COOKING;LAUNDRY;SHOPPING;MEAL PREPARATION;MEDICATION MANAGEMENT;MONEY MANAGEMENT;TRANSPORTATION
ADLS_ACUITY_SCORE: 41
ADLS_ACUITY_SCORE: 42
ADLS_ACUITY_SCORE: 40
ADLS_ACUITY_SCORE: 40

## 2023-08-10 NOTE — PLAN OF CARE
Goal Outcome Evaluation:      Observation goals  PRIOR TO DISCHARGE        Comments: -diagnostic tests and consults completed and resulted- not met  -vital signs normal or at patient baseline- partially met  -tolerating oral intake to maintain hydration- met  -adequate pain control on oral analgesics- met  -returns to baseline functional status- not met  -safe disposition plan has been identified- not met  Nurse to notify provider when observation goals have been met and patient is ready for discharge.

## 2023-08-10 NOTE — PROGRESS NOTES
08/10/23 1553   Appointment Info   Signing Clinician's Name / Credentials (PT) Kwame Chaparro DPT   Rehab Comments (PT) LSO for comfort   Quick Adds   Quick Adds Certification       Present yes   Language Vocera Greenlandic    Living Environment   People in Home child(thalia), adult   Current Living Arrangements apartment   Home Accessibility no concerns   Transportation Anticipated family or friend will provide   Living Environment Comments Reports that she lives with her daughter. Reports daughter is gone most of the day at work from the morning until 7-8 PM.   Self-Care   Usual Activity Tolerance moderate   Current Activity Tolerance moderate   Equipment Currently Used at Home cane, straight;walker, rolling  (Per report has a FWW but Pt states only has a SEC)   Fall history within last six months no   Activity/Exercise/Self-Care Comment Reports typically independent w/ mobility w/ SEC. Gets help from daughter w/ meals and cleaning. Per Pt's RN Pt's bed at home is reportedly on the floor.   General Information   Onset of Illness/Injury or Date of Surgery 08/09/23   Referring Physician Romaine Null PA-C   Patient/Family Therapy Goals Statement (PT) Get better   Pertinent History of Current Problem (include personal factors and/or comorbidities that impact the POC) June MYLES Do is a 82 year old female admitted on 8/9/2023 past medical history significant for HLP, DM2, CKD stage 3, OA, Chronic Anemia, B12 Def, Iron Def, Urinary incontinence, Memory issues, History of CVA who was registered to observation due to acute back pain.   Existing Precautions/Restrictions fall   Cognition   Affect/Mental Status (Cognition) flat/blunted affect;sad/depressed affect   Orientation Status (Cognition) oriented x 3   Follows Commands (Cognition) follows one-step commands;75-90% accuracy;repetition of directions required   Pain Assessment   Patient Currently in Pain Yes, see Vital Sign  flowsheet  (Reports pain to her LBP)   Range of Motion (ROM)   ROM Comment WFLs for mobility and transfers no formal testing completed   Strength (Manual Muscle Testing)   Strength Comments Generalized weakness d/t pain   Bed Mobility   Comment, (Bed Mobility) Supine to L sidelying w/ CGA   Transfers   Comment, (Transfers) Sit to stand w/ FWW and CGA   Gait/Stairs (Locomotion)   Comment, (Gait/Stairs) 10 ft w/ FWW and CGA   Balance   Balance Comments No overt LOB noted   Clinical Impression   Criteria for Skilled Therapeutic Intervention Yes, treatment indicated   PT Diagnosis (PT) Impaired ambulation   Influenced by the following impairments Impaired strength, balance, activity tolerance   Functional limitations due to impairments Impaired ADLs, IADLs and functional mobility   Clinical Presentation (PT Evaluation Complexity) Stable/Uncomplicated   Clinical Presentation Rationale Clinical judgment   Clinical Decision Making (Complexity) low complexity   Planned Therapy Interventions (PT) gait training;home exercise program;ROM (range of motion);strengthening;stretching;transfer training;patient/family education;progressive activity/exercise   Risk & Benefits of therapy have been explained evaluation/treatment results reviewed;care plan/treatment goals reviewed;risks/benefits reviewed;current/potential barriers reviewed;participants voiced agreement with care plan;participants included   PT Total Evaluation Time   PT Eval, Low Complexity Minutes (43272) 10   Therapy Certification   Start of care date 08/10/23   Certification date from 08/10/23   Certification date to 08/20/23   Medical Diagnosis Compression fx   Physical Therapy Goals   PT Frequency 6x/week   PT Predicted Duration/Target Date for Goal Attainment 08/20/23   PT Goals Bed Mobility;Transfers;Gait   PT: Bed Mobility Independent;Supine to/from sit   PT: Transfers Modified independent;Sit to/from stand   PT: Gait Modified independent;150 feet;Rolling walker    Interventions   Interventions Quick Adds Gait Training;Therapeutic Activity   Therapeutic Activity   Therapeutic Activities: dynamic activities to improve functional performance Minutes (08650) 15   Symptoms Noted During/After Treatment Increased pain   Treatment Detail/Skilled Intervention Pt supine in bed at start of session. Pt agreeable to PT. Pt transfering L sidelying to sitting at EOB w/ Min A x1. Unable to push self up d/t pain on multiple attempts. Once sitting up at EOB Pt w/ good balance. Needing assistance w/ placing LSO brace. Reports pain levels are mild sitting EOB. Pt completing sit to stand from bed height x2 w/ FWW and CGA progressing to SBA. Sit to stand from chair height x1 w/ FWW and SBA. Sit to stand from SEC x2 from bed height w/ SBA. Good balance noted. Sit to supine at end of session w/ SBA w/ log roll technique. Reports increased pain w/ transfer. Extra time for room set up and interpreting.   Gait Training   Gait Training Minutes (46221) 15   Symptoms Noted During/After Treatment (Gait Training) increased pain   Treatment Detail/Skilled Intervention Pt ambulating ~125 ft x2 w/ FWW and CGA progressing to SBA. Pt also ambulating ~125 ft w/ SEC and SBA. No overt LOB noted. Pt w/ slow cipriano throughout. Educated on keeping FWW closer to body. Adjusted walker height.   PT Discharge Planning   PT Plan Bed mobility, transfers, ambulation distance w/ FWW vs SEC, balance, strengthening   PT Discharge Recommendation (DC Rec) Transitional Care Facility;home with assist;home with home care physical therapy   PT Rationale for DC Rec Pt below baseline mobility. Reportedly lives in an apartment w/ daughter. Unclear if uses SEC vs FWW at home. Per RN report Pts mattress on floor at home. Pt's daughter reportedly gone most of the day at work. At this time would recommend TCU at [place] to improve upon functional mobility and strengthening as Pt unable to place brace herself or get OOB. Also limited by pain.  If were to DC home would recommend 24/7 assist for mobility w/ HHPT.   PT Brief overview of current status SBA w/ SEC vs FWW, Min A bed mobility   Total Session Time   Timed Code Treatment Minutes 30   Total Session Time (sum of timed and untimed services) 40   [facility]  OUTPATIENT PHYSICAL THERAPY EVALUATION  PLAN OF TREATMENT FOR OUTPATIENT REHABILITATION  (COMPLETE FOR INITIAL CLAIMS ONLY)  Patient's Last Name, First Name, M.I.  YOB: 1940  June Shultz                        Provider's Name  [facility] Medical Record No.  [id number]                             Onset Date:  08/09/23   Start of Care Date:  08/10/23   Type:     _X_PT   ___OT   ___SLP Medical Diagnosis:  Compression fx              PT Diagnosis:  Impaired ambulation Visits from SOC:  1     See note for plan of treatment, functional goals and certification details    I CERTIFY THE NEED FOR THESE SERVICES FURNISHED UNDER        THIS PLAN OF TREATMENT AND WHILE UNDER MY CARE     (Physician co-signature of this document indicates review and certification of the therapy plan).

## 2023-08-10 NOTE — PLAN OF CARE
Observation Goals:    -diagnostic tests and consults completed and resulted: Not met  -vital signs normal or at patient baseline: Met  -tolerating oral intake to maintain hydration: not met  -adequate pain control on oral analgesics: not met  -returns to baseline functional status: not met  -safe disposition plan has been identified: not met     Nurse to notify provider when observation goals have been met and patient is ready for discharge.

## 2023-08-10 NOTE — PROGRESS NOTES
[facility]    Medicine Progress Note - Hospitalist Service    Date of Admission:  8/9/2023    Assessment & Plan    June MYLES Do is a 82 year old female with PMHx of HLP, DM2, CKD stage 3, OA, Chronic Anemia, B12 Def, Iron Def, Urinary incontinence, Memory issues, History of CVA who was registered to observation due to acute back pain.       # Acute back pain  # Incidental Subacute L2, L4, L5 compression fractures   # Falls  Incidental findings of L2, L4, L5 fractures on imaging.  Lumbar spine MRI ordered on admission indicating recent L2 inferior endplate compression with mild height loss.  CT head without acute findings.  - Appreciate input from neurosurgery.  Recommending conservative management with LSO brace  - Orthotics consult placed for brace  - Recommend Tylenol for pain control.  As needed oxycodone   - PT consult  - Social work consult for dispo planning.  Per patient report to nursing she is concerned about returning home due to level of supervision and cares.  - Likely discharge tomorrow pending PT and SW input     #DM type II, very poorly controlled  Recent A1c in May up to 14.5.  Blood glucose persistently running high 200s to 300s here.  Patient on empagliflozin 10 mg daily plus Lantus 10 units daily plus 2 units Novolog before meals.  - Restart PTA Jardiance 10 mg daily  - Increase Lantus back to home dose of 10 units daily 2 AM but likely needs up titration tomorrow.  - Resume home Novolog 2 units before meals  - Medium intensity sliding scale for now  - Gave okay for one-time 5 unit dose at lunch for preprandial 350.  - Hypoglycemia protocol    #Urinary incontinence  This does not appear to be new.  CK normal.  UA notable for greater than thousand glucose, which is likely result of being on Jardiance.  No signs of infection.  - Can discontinue neurochecks.  -Diabetes management above  - Continue use of depends    # CKD stage IIIa/b  Baseline Cr 1.5-1.7 since November.  Suspect likely due to uncontrolled diabetes   - Cr up to 1.93 today  - DM management as above     # Altered mental status  # Memory issues  Some degree of baseline cognitive impairment, reviewed PCP note from 3/2023 through [facility].  Patient difficult to understand at times as she uses a mix of English and [place].  Per RN conversation with patient utilizing  patient reports she feels unsafe to return home at this time due to lack of support at home.  - Monitor.    - IV fluids with NS at 75 ml/hr overnight   - Keep previously scheduled neurology appointment for 10/4/2023     Incidental pulmonary nodule  - Recommend follow up with pulmonary nodule clinic (referral will need to be made at discharge).      OA- Pain management as above.       Chronic Anemia (microcytic hypochromic)  B12 Def  Iron Def  HGB ranges between 8.5-9.1.    - Hold PTA supplements and resume at discharge.           History of CVA   Unchanged chronic left basal ganglia/corona radiata region infarct noted on head CT.    - Resumed on PTA ASA 81 mg/d.    - Resume PTA Lovastatin            Diet: Combination Diet Moderate Consistent Carb (60 g CHO per Meal) Diet; No Caffeine Diet, Low Saturated Fat Na <2400mg Diet    DVT Prophylaxis: Pneumatic Compression Devices  Sotelo Catheter: Not present  Lines: None     Cardiac Monitoring: None  Code Status: Full Code      Disposition Plan      Expected Discharge Date: 08/11/2023        Discharge Comments: NRS consulted. PT.        The patient's care was discussed with the Attending Physician, Dr. Balderas, Bedside Nurse, Care Coordinator/, and Patient.    David Rocha PA-C  Hospitalist Service  [facility]  Securely message with [employer] (more info)  Text page via [employer] Paging/Directory     Clinically Significant Risk Factors Present on Admission                # Drug Induced Platelet Defect: home medication list includes an antiplatelet medication       # DMII: A1C  = 14.5 % (Ref range: <5.7 %) within past 6 months            ______________________________________________________________________    Interval History   Response to questioning but is difficult to understand.  Currently mumbling at a combination of English reviewing images.    Did speak to patient's nurse.  Per conversations with nurse throughout the day.  Interpretation has been difficult due to her combination of being immitis and English.  However, patient was able to communicate that she is hesitant to return home at this time due to lack of support at home in the setting of recent compression fractures recurrent falls.    Data reviewed today: I reviewed all medications, new labs and imaging results over the last 24 hours. Please see discussion of these results in the A/P.    Physical Exam     Vital Signs: Temp: 97.8  F (36.6  C) Temp src: Oral BP: (!) 140/58 Pulse: 64     Resp: 18 SpO2: 99 % O2 Device: None (Room air)    Weight: 0 lbs 0 oz    Constitutional: awake, alert, cooperative, in no acute distress. Responds to questioning but frequently mumbling in a mix of English and Belgian.  Eyes: EOM, PERRLA. Sclera clear, conjunctiva normal. Anicteric   HENT: Normocephalic, without obvious abnormality, atraumatic.   Respiratory: No increased work of breathing. CTAB  Cards: RRR, no murmurs   GI: Soft, non-tender without rebound or guarding.   Musculoskeletal:  Full range of motion noted.    Neurologic: Cranial nerves II-XII are grossly intact.   Neuropsychiatric: General: normal, calm and normal eye contact. Normal affect          Data   Recent Labs   Lab 08/10/23  1150 08/10/23  0849 08/10/23  0636 08/10/23  0622 08/09/23  1654 08/09/23  0742   WBC  --   --   --  4.9  --  6.8   HGB  --   --   --  7.7*  --  8.5*   MCV  --   --   --  65*  --  65*   PLT  --   --   --  222  --  225   NA  --   --   --  137  --  137   POTASSIUM  --   --   --  4.2  --  3.9   CHLORIDE  --   --   --  105  --  102   CO2  --   --   --   21*  --  24   BUN  --   --   --  36.4*  --  26.3*   CR  --   --   --  1.93*  --  1.78*   ANIONGAP  --   --   --  11  --  11   MANUEL  --   --   --  8.6*  --  9.1   * 301* 252* 290*   < > 367*   ALBUMIN  --   --   --   --   --  3.7   PROTTOTAL  --   --   --   --   --  6.8   BILITOTAL  --   --   --   --   --  0.3   ALKPHOS  --   --   --   --   --  129*   ALT  --   --   --   --   --  <5   AST  --   --   --   --   --  9    < > = values in this interval not displayed.       Recent Results (from the past 24 hour(s))   Lumbar spine MRI w/o contrast    Narrative    MRI LUMBAR SPINE WITHOUT CONTRAST   8/9/2023 4:25 PM     HISTORY: Low back pain - loss of control of bladder.    TECHNIQUE: Multiplanar multisequence MRI of the lumbar spine without  contrast.    COMPARISON: Abdomen and pelvis CT 8/9/2023.     FINDINGS:  Recent fracture involving the L2 inferior endplate with linear T1  hypointense signal and T2 hyperintense signal with probable subtle  correlate in retrospect on the same day of abdomen and pelvis CT.    Mild height loss involving the L4 and L5 vertebral bodies with  nonspecific T1 hypointense T2 hyperintense signal. Abnormal T2  hyperintense signal within the L4-5 disc.    Alignment is significant for multilevel subtle grade 1  spondylolisthesis. Conus medullaris is unremarkable terminating at the  level of the L1-2 disc. Cauda equina is unremarkable. Nonspecific T2  hyperintense lesions within the left kidney which are incompletely  characterized but statistically likely benign cysts. Bilateral  sacroiliac joint degenerative change. Mild bilateral paraspinal  muscular edema/fluid stranding, presumably posttraumatic.    Segmental Analysis:     T12-L1:  Disc height maintained. No herniation. Moderate right and  mild left facet arthropathy. No foraminal or spinal canal stenosis.     L1-L2:  Disc height maintained. No herniation. Mild bilateral facet  arthropathy. No foraminal or spinal canal stenosis.       L2-L3:  Mild disc height loss. Minimal bulge. Mild bilateral facet  arthropathy. No right foraminal stenosis. Mild if any left foraminal  stenosis. No spinal canal stenosis.      L3-L4:  Disc height maintained. T2 hyperintense signal within the  anterior disc, presumably degenerative fissure. Mild to moderate  bilateral facet arthropathy. No foraminal or spinal canal stenosis.    L4-L5:  Disc height maintained. Disc bulge with bilateral foraminal  components. Abnormal T2 hyperintense signal within the disc. Mild to  moderate bilateral facet arthropathy. Moderate bilateral foraminal  stenosis. Mild if any spinal canal stenosis.    L5-S1:  Disc height maintained. Right central annular fissure.  Moderate bilateral facet arthropathy. No right foraminal stenosis.  Mild left foraminal stenosis. No spinal canal stenosis.      Impression    IMPRESSION:    1. Recent L2 inferior endplate compression fracture with mild height  loss.  2. Mild compression deformities at L4 and L5 with associated marrow  edema which is favored to be related to recent fracture and/or  degeneration, however infection cannot be excluded.  3. Abnormal signal within the L4-5 disc probably  degenerative/posttraumatic, however infection cannot be excluded.  4. Mild bilateral paraspinal muscular edema/fluid stranding,  presumably posttraumatic.  5. Multilevel degenerative change.    BREANN ENAMORADO MD         SYSTEM ID:  [id number]

## 2023-08-10 NOTE — CARE PLAN
Orientation/Cognitive: A/O x2, forgetful to time/situation  Observation Goals (Met/ Not Met): not met  Mobility Level/Assist Equipment: A1 GB/W  Fall Risk (Y/N): yes  Behavior Concerns: None  Pain Management: hai. Tylenol, IV dilaudid x1 after ambulating to bathroom, reporting increased pain  Tele/VS/O2: vitally stable on RA  ABNL Lab/BG: see results  Diet: mod carb, cardiac  Bowel/Bladder: has been continent since arriving to unit  Skin Concerns: none  Drains/Devices: PIV infusing NS 75 ml/hr  Tests/Procedures for next shift: neurosurgery consult, PT, SW/CM  Anticipated DC date & active delays: TBD  Patient Stated Goal for Today: none stated.

## 2023-08-10 NOTE — PLAN OF CARE
Observation Goals:     -diagnostic tests and consults completed and resulted: Not met  -vital signs normal or at patient baseline: Met  -tolerating oral intake to maintain hydration: Not met  -adequate pain control on oral analgesics: Met  -returns to baseline functional status: not met  -safe disposition plan has been identified: not met     Nurse to notify provider when observation goals have been met and patient is ready for discharge.

## 2023-08-10 NOTE — PLAN OF CARE
Observation Goals:     -diagnostic tests and consults completed and resulted: Not met, PT/neurosurgery eval pending  -vital signs normal or at patient baseline: Met  -tolerating oral intake to maintain hydration: met  -adequate pain control on oral analgesics: Met  -returns to baseline functional status:Improving since admission  -safe disposition plan has been identified: not met     Nurse to notify provider when observation goals have been met and patient is ready for discharge.

## 2023-08-10 NOTE — PLAN OF CARE
Observation Goals:     -diagnostic tests and consults completed and resulted: Not met, PT eval pending  -vital signs normal or at patient baseline: Met  -tolerating oral intake to maintain hydration: met  -adequate pain control on oral analgesics: Met  -returns to baseline functional status:Improving since admission  -safe disposition plan has been identified: not met     Nurse to notify provider when observation goals have been met and patient is ready for discharge.

## 2023-08-10 NOTE — PLAN OF CARE
Goal Outcome Evaluation:    Summary: Closed compression fracture of L5 lumbar vertebra  Date/ Time: 8.9.23-8.10.23, 1900-730  Orientation/Cognitive: A/O x2, disoriented to time and situation  Observation Goals (Met/ Not Met): Not Met  Mobility Level/Assist Equipment: A1, GBW  Fall Risk (Y/N): Y  Behavior Concerns: None.  Pain Management: scheduled tylenol  Tele/VS/O2: Elevated BP running in AM  ABNL Lab/BG: , 252  Diet: Mod carb diet, no caffeine  Bowel/Bladder: incontinent at times  Skin Concerns: None.  Drains/Devices: L PIV cont. Infusion NaCL 0.9% at 75 ml/hr  Tests/Procedures for next shift: Neuro checks, Neuro consult, BMP, CBC with PLT  Anticipated DC date & active delays: TBD pending Neuro consult, PT and SW consult  Patient Stated Goal for Today: Rest

## 2023-08-10 NOTE — CONSULTS
Care Management Initial Consult    General Information  Assessment completed with: Children, Gloria  Type of CM/SW Visit: Initial Assessment    Primary Care Provider verified and updated as needed: Yes   Readmission within the last 30 days: no previous admission in last 30 days      Reason for Consult: discharge planning  Advance Care Planning: Advance Care Planning Reviewed: no concerns identified          Communication Assessment  Patient's communication style: spoken language (non-English)    Hearing Difficulty or Deaf: no        Cognitive  Cognitive/Neuro/Behavioral: .WDL except  Level of Consciousness: alert  Arousal Level: opens eyes spontaneously  Orientation: disoriented to, time, situation  Mood/Behavior: calm, cooperative  Best Language: 0 - No aphasia  Speech: other (see comments) (soft,  intermitantly has difficulty understanding patient.)    Living Environment:   People in home: child(thalia), adult  Gloria- Daughter  Current living Arrangements: apartment      Able to return to prior arrangements:         Family/Social Support:  Care provided by: self, child(thalia)  Provides care for: no one, unable/limited ability to care for self                Description of Support System:           Current Resources:   Patient receiving home care services: No     Community Resources: None  Equipment currently used at home:    Supplies currently used at home: None    Employment/Financial:  Employment Status: retired        Financial Concerns: No concerns identified           Does the patient's insurance plan have a 3 day qualifying hospital stay waiver?  No    Lifestyle & Psychosocial Needs:  Social Determinants of Health     Tobacco Use: Low Risk  (5/13/2023)    Patient History     Smoking Tobacco Use: Never     Smokeless Tobacco Use: Never     Passive Exposure: Not on file   Alcohol Use: Not on file   Financial Resource Strain: Not on file   Food Insecurity: Not on file   Transportation Needs: Not on file    Physical Activity: Not on file   Stress: Not on file   Social Connections: Not on file   Intimate Partner Violence: Not on file   Depression: Not on file   Housing Stability: Not on file       Functional Status:  Prior to admission patient needed assistance:   Dependent ADLs:: Ambulation-cane, Bathing, Dressing, Grooming  Dependent IADLs:: Cleaning, Cooking, Laundry, Shopping, Meal Preparation, Medication Management, Money Management, Transportation       Mental Health Status:          Chemical Dependency Status:                Values/Beliefs:  Spiritual, Cultural Beliefs, Spiritism Practices, Values that affect care: no               Additional Information:  Writer called Daughter -Gloria and introduced self and role in discharge planning. Pt lives in an apartment with her daughter. Gloria takes care off all her mother's needs. Pt gets around with a cane or a walker at times. Gloria drives her mother to all needed appointments. Waiting for PT recs, and will update daughter once they are made and proceed with discharge plans.        Yojana Bran, RN, BSN, Care Coordinator

## 2023-08-10 NOTE — CONSULTS
Neurosurgery Consult    HPI    Ms. Shultz is a 82-year-old female who presented to the emergency department due to ongoing back pain without have urinary incontinence symptoms altered mental status.    Patient is seen today with the aid of a breeze .  Patient reports back pain, difficult for her to exactly point to where it is coming from.  Appears to be more diffuse in her midline low back.  She denies radicular symptoms.  Lumbar MRI performed yesterday.    Unclear if there is any traumatic component to her back pain, but there does not appear to be any..    Medical history  Type 2 diabetes  Chronic kidney disease  Chronic anemia  Urinary incontinence  Memory issue  History of CVA    Social history  Use of the [place]       B/P: 135/70, T: 97.5, P: 63, R: 18       Exam    Alert, follows commands, no acute distress  Moving bilateral lower extremities with 5 out of 5 strength, including hip flexion knee flexion extension, ankle dorsiflexion plantarflexion.  Lumbar spine appears to be diffusely tender to palpation  Upper extremities with appropriate strength    Imaging    Lumbar MRI demonstrates    IMPRESSION:    1. Recent L2 inferior endplate compression fracture with mild height  loss.  2. Mild compression deformities at L4 and L5 with associated marrow  edema which is favored to be related to recent fracture and/or  degeneration, however infection cannot be excluded.  3. Abnormal signal within the L4-5 disc probably  degenerative/posttraumatic, however infection cannot be excluded.  4. Mild bilateral paraspinal muscular edema/fluid stranding,  presumably posttraumatic.  5. Multilevel degenerative change.    Assessment    Lumbar compression fractures noted L2, L4, L5    Plan:      We will offer the patient a lumbar brace LSO for comfort.  Can be worn as needed.  Does not need to be worn when lying down.  Okay to place it when lying down or standing up, only needed for comfort.    We recommend  patient follow-up with our clinic in 6 weeks with lumbar x-rays AP and lateral prior.  Our clinic will contact the patient to arrange this follow-up.    Neurosurgery will sign off    Discussed with Dr. Gonzalez

## 2023-08-10 NOTE — PLAN OF CARE
Goal Outcome Evaluation:      Plan of Care Reviewed With: patient    Overall Patient Progress: improvingOverall Patient Progress: improving    Summary: Closed compression fracture of L5 lumbar vertebra  Date/ Time: 8.10.23, [id number]  Orientation/Cognitive: A/O x2, disoriented to time and situation  Observation Goals (Met/ Not Met): Not Met  Mobility Level/Assist Equipment: A1, GBW addendum: new back brace when OOB  Fall Risk (Y/N): Y  Behavior Concerns: None.  Pain Management: scheduled tylenol  Tele/VS/O2: vitally stable ex HTN  ABNL Lab/BG: , extra 5 units insulin administered with lunch per order.   Diet: Mod carb diet, no caffeine  Bowel/Bladder: continent throughout the day.   Skin Concerns: None.  Drains/Devices: L PIV cont. Infusion NaCL 0.9% at 75 ml/hr  Tests/Procedures for next shift: PT consult  Anticipated DC date & active delays: TBD, pending PT and SW consult  Patient Stated Goal for Today: pain control

## 2023-08-11 LAB
ANION GAP SERPL CALCULATED.3IONS-SCNC: 12 MMOL/L (ref 7–15)
BUN SERPL-MCNC: 30.1 MG/DL (ref 8–23)
CALCIUM SERPL-MCNC: 8.7 MG/DL (ref 8.8–10.2)
CHLORIDE SERPL-SCNC: 106 MMOL/L (ref 98–107)
CREAT SERPL-MCNC: 1.62 MG/DL (ref 0.51–0.95)
DEPRECATED HCO3 PLAS-SCNC: 21 MMOL/L (ref 22–29)
GFR SERPL CREATININE-BSD FRML MDRD: 32 ML/MIN/1.73M2
GLUCOSE BLDC GLUCOMTR-MCNC: 102 MG/DL (ref 70–99)
GLUCOSE BLDC GLUCOMTR-MCNC: 122 MG/DL (ref 70–99)
GLUCOSE BLDC GLUCOMTR-MCNC: 193 MG/DL (ref 70–99)
GLUCOSE BLDC GLUCOMTR-MCNC: 250 MG/DL (ref 70–99)
GLUCOSE BLDC GLUCOMTR-MCNC: 79 MG/DL (ref 70–99)
GLUCOSE SERPL-MCNC: 131 MG/DL (ref 70–99)
HGB BLD-MCNC: 8.1 G/DL (ref 11.7–15.7)
MAGNESIUM SERPL-MCNC: 1.8 MG/DL (ref 1.7–2.3)
PHOSPHATE SERPL-MCNC: 3.5 MG/DL (ref 2.5–4.5)
POTASSIUM SERPL-SCNC: 3.7 MMOL/L (ref 3.4–5.3)
SODIUM SERPL-SCNC: 139 MMOL/L (ref 136–145)

## 2023-08-11 PROCEDURE — 96375 TX/PRO/DX INJ NEW DRUG ADDON: CPT

## 2023-08-11 PROCEDURE — 36415 COLL VENOUS BLD VENIPUNCTURE: CPT | Performed by: NURSE PRACTITIONER

## 2023-08-11 PROCEDURE — 96376 TX/PRO/DX INJ SAME DRUG ADON: CPT

## 2023-08-11 PROCEDURE — 258N000003 HC RX IP 258 OP 636: Performed by: STUDENT IN AN ORGANIZED HEALTH CARE EDUCATION/TRAINING PROGRAM

## 2023-08-11 PROCEDURE — G0378 HOSPITAL OBSERVATION PER HR: HCPCS

## 2023-08-11 PROCEDURE — 84100 ASSAY OF PHOSPHORUS: CPT | Performed by: NURSE PRACTITIONER

## 2023-08-11 PROCEDURE — 82962 GLUCOSE BLOOD TEST: CPT

## 2023-08-11 PROCEDURE — 99233 SBSQ HOSP IP/OBS HIGH 50: CPT | Performed by: NURSE PRACTITIONER

## 2023-08-11 PROCEDURE — 250N000013 HC RX MED GY IP 250 OP 250 PS 637: Performed by: STUDENT IN AN ORGANIZED HEALTH CARE EDUCATION/TRAINING PROGRAM

## 2023-08-11 PROCEDURE — 85018 HEMOGLOBIN: CPT | Performed by: NURSE PRACTITIONER

## 2023-08-11 PROCEDURE — 250N000011 HC RX IP 250 OP 636: Mod: JZ | Performed by: NURSE PRACTITIONER

## 2023-08-11 PROCEDURE — 82310 ASSAY OF CALCIUM: CPT | Performed by: NURSE PRACTITIONER

## 2023-08-11 PROCEDURE — 250N000013 HC RX MED GY IP 250 OP 250 PS 637: Performed by: PHYSICIAN ASSISTANT

## 2023-08-11 PROCEDURE — 83735 ASSAY OF MAGNESIUM: CPT | Performed by: NURSE PRACTITIONER

## 2023-08-11 PROCEDURE — 250N000011 HC RX IP 250 OP 636: Mod: JZ | Performed by: PHYSICIAN ASSISTANT

## 2023-08-11 RX ORDER — HYDRALAZINE HYDROCHLORIDE 20 MG/ML
10 INJECTION INTRAMUSCULAR; INTRAVENOUS EVERY 6 HOURS PRN
Status: DISCONTINUED | OUTPATIENT
Start: 2023-08-11 | End: 2023-08-14 | Stop reason: HOSPADM

## 2023-08-11 RX ORDER — METHOCARBAMOL 500 MG/1
500 TABLET, FILM COATED ORAL 3 TIMES DAILY PRN
Status: DISCONTINUED | OUTPATIENT
Start: 2023-08-11 | End: 2023-08-14 | Stop reason: HOSPADM

## 2023-08-11 RX ADMIN — ACETAMINOPHEN 975 MG: 325 TABLET, FILM COATED ORAL at 23:10

## 2023-08-11 RX ADMIN — OXYCODONE HYDROCHLORIDE 2.5 MG: 5 TABLET ORAL at 06:41

## 2023-08-11 RX ADMIN — HYDROMORPHONE HYDROCHLORIDE 0.2 MG: 0.2 INJECTION, SOLUTION INTRAMUSCULAR; INTRAVENOUS; SUBCUTANEOUS at 12:54

## 2023-08-11 RX ADMIN — INSULIN ASPART 1 UNITS: 100 INJECTION, SOLUTION INTRAVENOUS; SUBCUTANEOUS at 17:32

## 2023-08-11 RX ADMIN — EMPAGLIFLOZIN 10 MG: 10 TABLET, FILM COATED ORAL at 08:33

## 2023-08-11 RX ADMIN — ASPIRIN 81 MG 81 MG: 81 TABLET ORAL at 08:33

## 2023-08-11 RX ADMIN — PRAVASTATIN SODIUM 20 MG: 20 TABLET ORAL at 21:46

## 2023-08-11 RX ADMIN — SODIUM CHLORIDE: 9 INJECTION, SOLUTION INTRAVENOUS at 06:42

## 2023-08-11 RX ADMIN — INSULIN GLARGINE 10 UNITS: 100 INJECTION, SOLUTION SUBCUTANEOUS at 09:09

## 2023-08-11 RX ADMIN — SENNOSIDES AND DOCUSATE SODIUM 2 TABLET: 50; 8.6 TABLET ORAL at 08:33

## 2023-08-11 RX ADMIN — LIDOCAINE 2 PATCH: 560 PATCH PERCUTANEOUS; TOPICAL; TRANSDERMAL at 08:35

## 2023-08-11 RX ADMIN — BISACODYL 10 MG: 10 SUPPOSITORY RECTAL at 17:36

## 2023-08-11 RX ADMIN — ACETAMINOPHEN 975 MG: 325 TABLET, FILM COATED ORAL at 08:33

## 2023-08-11 RX ADMIN — OXYCODONE HYDROCHLORIDE 2.5 MG: 5 TABLET ORAL at 11:53

## 2023-08-11 RX ADMIN — HYDRALAZINE HYDROCHLORIDE 10 MG: 20 INJECTION INTRAMUSCULAR; INTRAVENOUS at 11:54

## 2023-08-11 RX ADMIN — ACETAMINOPHEN 975 MG: 325 TABLET, FILM COATED ORAL at 00:49

## 2023-08-11 RX ADMIN — INSULIN ASPART 3 UNITS: 100 INJECTION, SOLUTION INTRAVENOUS; SUBCUTANEOUS at 13:48

## 2023-08-11 RX ADMIN — POLYETHYLENE GLYCOL 3350 17 G: 17 POWDER, FOR SOLUTION ORAL at 14:04

## 2023-08-11 RX ADMIN — ACETAMINOPHEN 975 MG: 325 TABLET, FILM COATED ORAL at 17:26

## 2023-08-11 ASSESSMENT — ACTIVITIES OF DAILY LIVING (ADL)
ADLS_ACUITY_SCORE: 40
ADLS_ACUITY_SCORE: 38
ADLS_ACUITY_SCORE: 38
ADLS_ACUITY_SCORE: 40
ADLS_ACUITY_SCORE: 38
ADLS_ACUITY_SCORE: 40

## 2023-08-11 NOTE — PROGRESS NOTES
[facility]    Medicine Progress Note - Hospitalist Service    Date of Admission:  8/9/2023    Assessment & Plan   June KENNEDY Do is an 82 year old female with past medical history significant for CVA, HLD, type 2 DM, CKD stage III, osteoarthritis, chronic anemia, urinary incontinence, and cognitive impairment admitted for acute back pain and found to have subacute L2, L4, and L5 compression fractures.        #Acute back pain  #Incidental subacute L2, L4, L5 compression fractures  #Recent falls  #Deconditioning  Incidental findings of L2, L4, L5 fractures on imaging.  Lumbar spine MRI ordered on admission with recent L2 inferior endplate compression with mild height loss.  CT head without any acute findings.  Case discussed with neurosurgery, recommend conservative management with LSO brace at this time.  -Appreciate physical therapy consult  -Patient likely would be best served with TCU stay  -Pain control: Continue scheduled acetaminophen 975 every 8 hours, scheduled lidocaine patches, and oxycodone 2.5 mg every 4 hours as needed; will add low-dose Robaxin 500 mg 3 times daily as needed as well  -Fall precautions    #Abdominal pain  Onset this afternoon.  Patient has been constipated.  Would like to try to have a bowel movement.  -Continue scheduled senna  -Will add as needed MiraLAX and suppository  -We will check magnesium level  -If no BM in next 24 to 48 hours, would check AXR  -Monitor closely    #Elevated blood pressure  Unclear etiology, at this point likely 2/2 acute pain.  SBP's 170-190 today.  Ordered IV hydralazine 10 mg IV every 6 hours as needed for SBP greater than 170.  Currently getting 2.5 mg of oxycodone every 4 hours as needed for acute pain.  -Monitor blood pressure per unit routine  -Low threshold for hypertensive urgency work-up pending trends given history of diabetes and CKD  -Continue IV hydralazine as needed as above    # Recent PVCs   # Irregular rhythm on  auscultation   Currently asymptomatic. EKG on 8/9 with sinus rhythm with PVCs, normal QTc.   - Check Mag, Phos    #Type 2 diabetes, poorly controlled  Hemoglobin A1c12.9% this admission.  Up to 14.5% in 5/2023.  Blood glucose consistently high 200s-300s here.  On empagliflozin 10 mg daily plus Lantus 10 units daily +2 units of NovoLog before meals.  Last 24 hour BG as follows:   Recent Labs   Lab 08/11/23  1236 08/11/23  0905 08/11/23  0834 08/11/23  0156 08/10/23  2117 08/10/23  1714   * 131* 102* 79 134* 105*   -Continue PTA Jardiance  -Continue Lantus 10 units daily  -Continue home NovoLog 2 units before meals  -Check blood glucose AC/at bedtime  -MSSI  -Hypoglycemia protocol in place    #Urinary incontinence  Chronic.  CK normal.  UA notable for greater than 1000 glucose, likely 2/2 therapy with Jardiance.  No signs of infection at this time, low threshold to repeat UA/UC if any clinical changes.    #CKD stage III  Baseline creatinine 1.5-1.7 since 11/2022.  Likely 2/2 poorly controlled diabetes. Cr 1.62 today.   -Continue to monitor renal function  -Avoid/minimize nephrotoxic agents  -Needs improved long-term control of diabetes    #Altered mental status  #Cognitive impairment, memory issues  Per PCP note from 3/2023 (Donald), patient has some degree of baseline cognitive impairment.  Per chart review, patient had verbalized via  that she feels unsafe to return to her home due to lack of support.  -PT evaluation today, recommendation for TCU versus home with home care, which would need to be 24/7  -Follow-up with neurology as currently scheduled on 10/4/2023    #Incidental pulmonary nodule  Currently asymptomatic.  - Will need Pulmonary nodule clinic referral prior to discharge     #Osteoarthritis  Pain control as above.     #Chronic microcytic hypochromic anemia  #B12 deficiency, iron deficiency  -Check CBC, B12, folate levels     #History of CVA  Unchanged chronic left basal ganglia/corona  radiata region infarct noted on head CT.  -Continue PTA aspirin 81 mg  -Continue PTA lovastatin         Diet: Combination Diet Moderate Consistent Carb (60 g CHO per Meal) Diet; No Caffeine Diet, Low Saturated Fat Na <2400mg Diet    DVT Prophylaxis: Pneumatic Compression Devices  Sotelo Catheter: Not present  Lines: None     Cardiac Monitoring: None  Code Status: Full Code      Clinically Significant Risk Factors Present on Admission                # Drug Induced Platelet Defect: home medication list includes an antiplatelet medication       # DMII: A1C = 12.9 % (Ref range: <5.7 %) within past 6 months            Disposition Plan     Expected Discharge Date: 08/11/2023      Destination: home with family;home with help/services  Discharge Comments: NRS consulted. PT.        The patient's care was discussed with the Attending Physician, Dr. Juana Balderas .    [facility]  Hospitalist Service  [facility]  Securely message with [employer] (more info)  Text page via [employer] Paging/Directory   ______________________________________________________________________    Interval History   Tieng is resting in bed.  She reports some abdominal pain.  Has not had a bowel movement today.  She denies chest pain, shortness of breath, dizziness, and fevers.    Physical Exam   Vital Signs: Temp: 97.4  F (36.3  C) Temp src: Oral BP: (!) 177/73 Pulse: 66   Resp: 18 SpO2: 98 % O2 Device: None (Room air)    Weight: 102 lbs 4.7 oz    GENERAL: Alert and alert well nourished, well developed.  8 body habitus. No acute distress.    HEENT: Normocephalic, atraumatic. Anicteric sclera. Mucous membranes moist.   CV: Irregular. S1, S2. No murmurs appreciated.   RESPIRATORY: Effort normal on room air. Lungs CTAB with no wheezing, rales, or rhonchi.   GI: Abdomen soft and non distended, bowel sounds present x all 4 quadrants. No tenderness, rebound, or guarding.   NEUROLOGICAL: No focal deficits. Follows commands.   Strength equal in upper and lower extremities.   MUSCULOSKELETAL: No joint swelling or tenderness. Moves all extremities.   EXTREMITIES: No gross deformities. No peripheral edema.   SKIN: Grossly warm, dry, and intact. No jaundice. No rashes.       Medical Decision Making       50 MINUTES SPENT BY ME on the date of service doing chart review, history, exam, documentation & further activities per the note.      Data     I have personally reviewed the following data over the past 24 hrs:    N/A  \   N/A   / N/A     139 106 30.1 (H) /  250 (H)   3.7 21 (L) 1.62 (H) \     TSH: N/A T4: N/A A1C: N/A       Imaging results reviewed over the past 24 hrs:   No results found for this or any previous visit (from the past 24 hour(s)).

## 2023-08-11 NOTE — PROGRESS NOTES
Summary: Admitted for acute back pain and found to have subacute L2, L4, and L5 compression fractures.   Orientation/Cognitive: A/O x2, disoriented to time and situation.   Observation Goals (Met/ Not Met): Not Met  Mobility Level/Assist Equipment: A1 Gb/W.   Fall Risk (Y/N): Yes  Behavior Concerns: Green; confused  Pain Management: Julisa Tyl, PRN oxy , IV dilaudid,heat pack applied  Tele/VS/O2: Elevated BP, PRN IV Hydralazine given w/ relief, other VSS on RA.  ABNL Lab/BG: Creat 1.62,hgb 8.1  Diet: Mod CHO, no caffeine  Bowel/Bladder:Incontinent of bladder. Constipated, senna, miralax and bisacodyl supp given w/ a result of large, hard stool.   Skin Concerns: WDL  Drains/Devices:LSO brace when OOB  Tests/Procedures for next shift: None   Anticipated DC date & active delays: TBD, SW following for placement  Patient Stated Goal for Today: Pain control

## 2023-08-11 NOTE — PLAN OF CARE
Summary: Closed compression fracture of L5, acute back pain    Orientation/Cognitive: A/O x2, disoriented to time and situation; bed jumper  Observation Goals (Met/ Not Met): Not Met  Mobility Level/Assist Equipment: A1 Gb/W  Fall Risk (Y/N): Yes  Behavior Concerns: Green; confused  Pain Management: Julisa Tyl, PRN oxy x1 given, heat pack applied  Tele/VS/O2: VSS ex HTN on RA  ABNL Lab/BG: BG 79  Diet: Mod CHO, no caffeine  Bowel/Bladder: Cont to B/B  Skin Concerns: WDL  Drains/Devices: NS 75 mL/hr, LSO brace OOB  Tests/Procedures for next shift: SW following for placement  Anticipated DC date & active delays: TBD   Patient Stated Goal for Today: Sleep    Observation goals  PRIOR TO DISCHARGE        Comments:   -diagnostic tests and consults completed and resulted: not met  -vital signs normal or at patient baseline: partially met  -tolerating oral intake to maintain hydration: met  -adequate pain control on oral analgesics: met  -returns to baseline functional status: not met  -safe disposition plan has been identified: not met  Nurse to notify provider when observation goals have been met and patient is ready for discharge.

## 2023-08-11 NOTE — PROGRESS NOTES
Observation goals  PRIOR TO DISCHARGE       Comments:      -diagnostic tests and consults completed and resulted: Partially  met. Pending placement.      -vital signs normal or at patient baseline: Met     -tolerating oral intake to maintain hydration: met     -adequate pain control on oral analgesics: met     -returns to baseline functional status: not met     -safe disposition plan has been identified: not met  Nurse to notify provider when observation goals have been met and patient is ready for discharge.

## 2023-08-11 NOTE — PROGRESS NOTES
Observation goals  PRIOR TO DISCHARGE        Comments:   -diagnostic tests and consults completed and resulted: not met  -vital signs normal or at patient baseline: partially met  -tolerating oral intake to maintain hydration: met  -adequate pain control on oral analgesics: met  -returns to baseline functional status: not met  -safe disposition plan has been identified: not met  Nurse to notify provider when observation goals have been met and patient is ready for discharge.

## 2023-08-11 NOTE — PLAN OF CARE
Goal Outcome Evaluation:    Observation goals  PRIOR TO DISCHARGE        Comments: -diagnostic tests and consults completed and resulted- not met  -vital signs normal or at patient baseline- not met  -tolerating oral intake to maintain hydration- met  -adequate pain control on oral analgesics- partially met  -returns to baseline functional status- not met  -safe disposition plan has been identified-not met  Nurse to notify provider when observation goals have been met and patient is ready for discharge.

## 2023-08-11 NOTE — PROGRESS NOTES
Care Management Follow Up    Length of Stay (days): 0    Expected Discharge Date: 08/11/2023     Concerns to be Addressed: all concerns addressed in this encounter     Patient plan of care discussed at interdisciplinary rounds: Yes    Education Provided on the Discharge Plan:    Patient/Family in Agreement with the Plan:      Referrals Placed by CM/SW:    Private pay costs discussed: transportation costs    Additional Information:  LUCRETIA consulted with Anisha from [facility] who said their billing department ran the patient's insurance and she would have 100% coverage at a TCU for up to 180 days. They do not have bed availability though.     CNP reported to LUCRETIA that patient is not yet medially ready, she has uncontrolled pain and concerns to address regarding her hemoglobin and blood pressure. Patient may be ready in a couple of days.     LUCRETIA spoke to patient's daughter Gloria at [phone] to provide education on skilled nursing facilities and a TCU stay. She is agreeable and would like referrals to be made near [place] and [place]. She prefers a facility with a memory care bed, SW will send referrals but let her know the location may be farther and they may not have availability. When the time comes, patient's daughter can leave work early to provide transportation.     ADD: 1600  Patient was accepted to [facility] [place], operated by [facility] TCU with a memory care bed, which would be available on Monday 8/14. They report needing time to get an  to help with patient's admission meeting. They request that family be present as well at an admission meeting to help with translation.     LUCRETIA received a call from [facility] saying they are going to check with their business office on coverage for a TCU stay and then they can consider placement for next week.     Angelica Amato, MAYA, LGSW   Social Work   [facility]

## 2023-08-11 NOTE — PROGRESS NOTES
Observation goals  PRIOR TO DISCHARGE       Comments:     -diagnostic tests and consults completed and resulted: Partially  met    -vital signs normal or at patient baseline: Not met    -tolerating oral intake to maintain hydration: met    -adequate pain control on oral analgesics: met    -returns to baseline functional status: not met    -safe disposition plan has been identified: not met  Nurse to notify provider when observation goals have been met and patient is ready for discharge.

## 2023-08-11 NOTE — PROGRESS NOTES
Orientation/Cognitive: A&Ox2. Disoriented to time and place.  Observation Goals (Met/ Not Met): Not Met  Mobility Level/Assist Equipment: A-1 GB/walker  Fall Risk (Y/N): Y  Behavior Concerns: Green- Needs Uzbek interpretor  Pain Management: PRN Oxycodone Q4H  Tele/VS/O2: VSS on RA except hypertensive to 170's  ABNL Lab/BG: BG- 134, Cr- 1.93, Hgb 7.7  Diet: Mod carb., 2g Na- no caffeine  Bowel/Bladder: Incont. Bladder at times  Skin Concerns: scattered bruising  Drains/Devices: NS- 75ml/hr  Tests/Procedures for next shift: pending placement  Anticipated DC date & active delays: TBD  Patient Stated Goal for Today: None

## 2023-08-11 NOTE — UTILIZATION REVIEW
Concurrent stay review; Secondary Review Determination - [facility]        Under the authority of the Utilization Management Committee, the utilization review process indicated a secondary review on the above patient.  The review outcome is based on review of the medical records, discussions with staff, and applying clinical experience noted on the date of the review.        (x) Observation/outpatient Status Appropriate - Concurrent stay review       RATIONALE FOR DETERMINATION:   82-year-old female with a history of multiple medical conditions was admitted for observation due to acute back pain. Imaging revealed incidental subacute compression fractures at L2, L4, and L5. Conservative management with an LSO brace was recommended by neurosurgery. For pain control, Tylenol and occasional oxycodone are advised. There are concerns regarding her safe return home, with ongoing assessments from physical therapy and social work.   Patient delayed discharge is related to disposition, there is no medical necessity for inpatient admission at the time of this review. If there is a change in patient status, please resend for review.    The information on this document is developed by the utilization review team in order for the business office to ensure compliance.  This only denotes the appropriateness of proper admission status and does not reflect the quality of care rendered.       The definitions of Inpatient Status and Observation Status used in making the determination above are those provided in the CMS Coverage Manual, Chapter 1 and Chapter 6, section 70.4.       Sincerely,    Nasim Hughes MD

## 2023-08-12 LAB
ANION GAP SERPL CALCULATED.3IONS-SCNC: 12 MMOL/L (ref 7–15)
BUN SERPL-MCNC: 28.5 MG/DL (ref 8–23)
CALCIUM SERPL-MCNC: 8.7 MG/DL (ref 8.8–10.2)
CHLORIDE SERPL-SCNC: 106 MMOL/L (ref 98–107)
CREAT SERPL-MCNC: 1.69 MG/DL (ref 0.51–0.95)
DEPRECATED HCO3 PLAS-SCNC: 22 MMOL/L (ref 22–29)
ELLIPTOCYTES BLD QL SMEAR: SLIGHT
ERYTHROCYTE [DISTWIDTH] IN BLOOD BY AUTOMATED COUNT: 14.5 % (ref 10–15)
FOLATE SERPL-MCNC: 12.7 NG/ML (ref 4.6–34.8)
FRAGMENTS BLD QL SMEAR: SLIGHT
GFR SERPL CREATININE-BSD FRML MDRD: 30 ML/MIN/1.73M2
GLUCOSE BLDC GLUCOMTR-MCNC: 119 MG/DL (ref 70–99)
GLUCOSE BLDC GLUCOMTR-MCNC: 154 MG/DL (ref 70–99)
GLUCOSE BLDC GLUCOMTR-MCNC: 211 MG/DL (ref 70–99)
GLUCOSE BLDC GLUCOMTR-MCNC: 212 MG/DL (ref 70–99)
GLUCOSE BLDC GLUCOMTR-MCNC: 265 MG/DL (ref 70–99)
GLUCOSE SERPL-MCNC: 111 MG/DL (ref 70–99)
HCT VFR BLD AUTO: 26.1 % (ref 35–47)
HGB BLD-MCNC: 8 G/DL (ref 11.7–15.7)
MCH RBC QN AUTO: 19.3 PG (ref 26.5–33)
MCHC RBC AUTO-ENTMCNC: 30.7 G/DL (ref 31.5–36.5)
MCV RBC AUTO: 63 FL (ref 78–100)
PLAT MORPH BLD: ABNORMAL
PLATELET # BLD AUTO: 283 10E3/UL (ref 150–450)
POTASSIUM SERPL-SCNC: 3.9 MMOL/L (ref 3.4–5.3)
RBC # BLD AUTO: 4.15 10E6/UL (ref 3.8–5.2)
RBC MORPH BLD: ABNORMAL
SODIUM SERPL-SCNC: 140 MMOL/L (ref 136–145)
VIT B12 SERPL-MCNC: 313 PG/ML (ref 232–1245)
WBC # BLD AUTO: 5.7 10E3/UL (ref 4–11)

## 2023-08-12 PROCEDURE — 250N000013 HC RX MED GY IP 250 OP 250 PS 637: Performed by: NURSE PRACTITIONER

## 2023-08-12 PROCEDURE — 80048 BASIC METABOLIC PNL TOTAL CA: CPT | Performed by: NURSE PRACTITIONER

## 2023-08-12 PROCEDURE — G0378 HOSPITAL OBSERVATION PER HR: HCPCS

## 2023-08-12 PROCEDURE — 36415 COLL VENOUS BLD VENIPUNCTURE: CPT | Performed by: NURSE PRACTITIONER

## 2023-08-12 PROCEDURE — 82962 GLUCOSE BLOOD TEST: CPT

## 2023-08-12 PROCEDURE — 82746 ASSAY OF FOLIC ACID SERUM: CPT | Performed by: NURSE PRACTITIONER

## 2023-08-12 PROCEDURE — 250N000013 HC RX MED GY IP 250 OP 250 PS 637: Performed by: STUDENT IN AN ORGANIZED HEALTH CARE EDUCATION/TRAINING PROGRAM

## 2023-08-12 PROCEDURE — 99231 SBSQ HOSP IP/OBS SF/LOW 25: CPT | Performed by: NURSE PRACTITIONER

## 2023-08-12 PROCEDURE — 82607 VITAMIN B-12: CPT | Performed by: NURSE PRACTITIONER

## 2023-08-12 PROCEDURE — 85014 HEMATOCRIT: CPT | Performed by: NURSE PRACTITIONER

## 2023-08-12 PROCEDURE — 250N000013 HC RX MED GY IP 250 OP 250 PS 637: Performed by: PHYSICIAN ASSISTANT

## 2023-08-12 RX ADMIN — INSULIN GLARGINE 10 UNITS: 100 INJECTION, SOLUTION SUBCUTANEOUS at 08:17

## 2023-08-12 RX ADMIN — LIDOCAINE 2 PATCH: 560 PATCH PERCUTANEOUS; TOPICAL; TRANSDERMAL at 08:16

## 2023-08-12 RX ADMIN — ASPIRIN 81 MG 81 MG: 81 TABLET ORAL at 08:15

## 2023-08-12 RX ADMIN — SENNOSIDES AND DOCUSATE SODIUM 2 TABLET: 50; 8.6 TABLET ORAL at 08:15

## 2023-08-12 RX ADMIN — METHOCARBAMOL 500 MG: 500 TABLET ORAL at 17:25

## 2023-08-12 RX ADMIN — PRAVASTATIN SODIUM 20 MG: 20 TABLET ORAL at 21:44

## 2023-08-12 RX ADMIN — INSULIN ASPART 1 UNITS: 100 INJECTION, SOLUTION INTRAVENOUS; SUBCUTANEOUS at 08:17

## 2023-08-12 RX ADMIN — ACETAMINOPHEN 975 MG: 325 TABLET, FILM COATED ORAL at 08:15

## 2023-08-12 RX ADMIN — INSULIN ASPART 2 UNITS: 100 INJECTION, SOLUTION INTRAVENOUS; SUBCUTANEOUS at 17:21

## 2023-08-12 RX ADMIN — INSULIN ASPART 2 UNITS: 100 INJECTION, SOLUTION INTRAVENOUS; SUBCUTANEOUS at 12:57

## 2023-08-12 RX ADMIN — EMPAGLIFLOZIN 10 MG: 10 TABLET, FILM COATED ORAL at 08:15

## 2023-08-12 RX ADMIN — BISACODYL 10 MG: 10 SUPPOSITORY RECTAL at 18:36

## 2023-08-12 RX ADMIN — SENNOSIDES AND DOCUSATE SODIUM 2 TABLET: 50; 8.6 TABLET ORAL at 20:27

## 2023-08-12 RX ADMIN — POLYETHYLENE GLYCOL 3350 17 G: 17 POWDER, FOR SOLUTION ORAL at 08:14

## 2023-08-12 RX ADMIN — ACETAMINOPHEN 975 MG: 325 TABLET, FILM COATED ORAL at 16:08

## 2023-08-12 ASSESSMENT — ACTIVITIES OF DAILY LIVING (ADL)
ADLS_ACUITY_SCORE: 47
ADLS_ACUITY_SCORE: 42
ADLS_ACUITY_SCORE: 47
ADLS_ACUITY_SCORE: 47
ADLS_ACUITY_SCORE: 43
ADLS_ACUITY_SCORE: 47
ADLS_ACUITY_SCORE: 42
ADLS_ACUITY_SCORE: 42

## 2023-08-12 NOTE — PLAN OF CARE
Goal Outcome Evaluation:  Orientation/Cognitive: A&O x2, disoriented to time and situation  Observation Goals (Met/ Not Met): Not Met  Mobility Level/Assist Equipment: Ax1 Gb/W, LSO brace when OOB   Fall Risk (Y/N): Yes  Behavior Concerns: Green; confused  Pain Management: Julisa Tylenol, heat pack applied  Tele/VS/O2: Tele = SR,  VSS on RA   ABNL Lab/BG: BG = 193, 119  Diet: Mod CHO, no caffeine  Bowel/Bladder: Incontinent of bladder   Skin Concerns: WDL  Drains/Devices: PIV SL   Tests/Procedures for next shift: SW following for placement  Anticipated DC date & active delays: TBD   Patient Stated Goal for Today: Sleep      Observation goals  PRIOR TO DISCHARGE       Comments:   -diagnostic tests and consults completed and resulted- Partially met   -vital signs normal or at patient baseline- Met   -tolerating oral intake to maintain hydration- Met   -adequate pain control on oral analgesics- Met   -returns to baseline functional status- Not met   -safe disposition plan has been identified- Not met

## 2023-08-12 NOTE — PROGRESS NOTES
Observation goals  PRIOR TO DISCHARGE       Comments:      -diagnostic tests and consults completed and resulted: Partially  met. Pending placement.      -vital signs normal or at patient baseline: Partially Met, slightly elevated BP.      -tolerating oral intake to maintain hydration: met     -adequate pain control on oral analgesics: met     -returns to baseline functional status: not met     -safe disposition plan has been identified: not met  Nurse to notify provider when observation goals have been met and patient is ready for discharge.

## 2023-08-12 NOTE — PROGRESS NOTES
[facility]    Medicine Progress Note - Hospitalist Service    Date of Admission:  8/9/2023    Assessment & Plan   June Shultz is a Libyan language speaking 82-year-old female with PMH of urinary incontinence, CKD stage III, underlying cognitive impairment, memory issues, osteoarthritis, chronic microcytic hypochromic anemia, history of CVA involving chronic left basal ganglia/corona and deconditioning who was admitted on August 9, 2023 with acute back pain, recent falls, abdominal pain and recent PVC.  Patient is seen today for a follow-up medical management.    Acute back pain  Incidental subacute L 2, L4 and L5 compression fractures  Recent falls  Deconditioning  -MRI of lumbar spine  -IMPRESSION:    1. Recent L2 inferior endplate compression fracture with mild height  loss.  2. Mild compression deformities at L4 and L5 with associated marrow  edema which is favored to be related to recent fracture and/or  degeneration, however infection cannot be excluded.  3. Abnormal signal within the L4-5 disc probably  degenerative/posttraumatic, however infection cannot be excluded.  4. Mild bilateral paraspinal muscular edema/fluid stranding,  presumably posttraumatic.  5. Multilevel degenerative change  -Head CT scan without contrast  IMPRESSION:  1. No CT findings of acute intracranial process.  2. 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  3. Brain atrophy and presumed chronic small vessel ischemic changes,  as described.  -Continue with fall precaution  -Continue with current pain management not reported at this visit  -Avoid narcotic.  -Per physical therapy recommendation TCU   -Per care manager discharge on Monday due to placement issue.  -Hospital medicine service will continue to follow-up.    Abdominal pain most likely secondary to constipation  -Currently improved.  -Continue with bowel program.    Elevated blood pressure without underlying diagnosis of  hypertension  -Consider due to pain and hospitalization.  -Better at this time at 148/66 with a heart rate of 65.  -Continue with pain management and reassurance.  -Continue with as needed hydralazine per current parameters.  -Unlikely she will need medication, after discharge.  -Continue to follow-up.    Recent PVC  Irregular rhythm  EKG sinus rhythm with PVC  Normal magnesium  -Currently with heart rate within normal range.  -Remained asymptomatic.  -Discontinue telemetry  -Continue to follow-up.    Diabetes type 2 insulin-dependent poorly controlled with A1c at 12.9% noted 3 months ago was 14.5%.  -Blood glucose running between 102-250  -Continue with current sliding scale insulin  -Added parameter, hold for blood glucose < 100 for the  2 unit insulin bid.  -Recommend for patient to follow-up with her primary doctor for better management of diabetes type 2.  Noted improvement recently.    History of altered mental status none noted today.  Cognitive impairment/memory loss  -Per chart review noted by primary doctor.  -PT evaluation -Recommend TCU     Osteoarthritis  History of CVA  Chronic microcytic hypochromic anemia  B12 deficiency, iron deficiency  -Laboratory data reviewed today and noted folic acid 12.7  -Vitamin B12 at 313  -Hemoglobin 8.0 today baseline  -No sign of bleeding  -For history of CVA fall precaution ongoing.  -Continue to follow-up.         Diet: Combination Diet Moderate Consistent Carb (60 g CHO per Meal) Diet; No Caffeine Diet, Low Saturated Fat Na <2400mg Diet    DVT Prophylaxis: Pneumatic Compression Devices and Ambulate every shift  Sotelo Catheter: Not present  Lines: None     Cardiac Monitoring: None  Code Status: Full Code      Clinically Significant Risk Factors Present on Admission                # Drug Induced Platelet Defect: home medication list includes an antiplatelet medication       # DMII: A1C = 12.9 % (Ref range: <5.7 %) within past 6 months            Disposition Plan TCU  when facility is available.     Expected Discharge Date: 08/14/2023      Destination: home with family;home with help/services  Discharge Comments: PT- TCU  SW- referals sent out    pt was accepted [facility] TCU with memory care for 8/14  [facility] no [facility] beds   [facility]        The patient's care was discussed with the Attending Physician, Dr. Jesse Hathaway,, Bedside Nurse, Patient, and interdisciplinary care Team.  Patient plan of care discussed with Dr. Jesse Hathaway.    NORMAN [facility]  Hospitalist Service  [facility]  Securely message with [employer] (more info)  Text page via [facility] Paging/Directory   ______________________________________________________________________    Interval History   Do, June is a pleasant 82 years old Ukrainian language speaking female seen in the room sitting in a regular chair finishing up her breakfast.  Patient denies any discomfort with her limited English usage.  Attempted to utilize  line, but line was unavailable.  Per nursing report, patient has been ambulating in the unit without any problem.  Constipation has been an issue and she was given bowel medication yesterday and had a bowel movement.  Patient denies any other medical issues.  We will try again  line later, if patient needs to be reassessed again.    Physical Exam   Vital Signs: Temp: 97.5  F (36.4  C) Temp src: Oral BP: (!) 148/66 Pulse: 65   Resp: 18 SpO2: 98 % O2 Device: None (Room air)    Weight: 104 lbs 4.44 oz    Constitutional: awake, alert, cooperative, no apparent distress, and appears stated age  Eyes: pupils equal, round and reactive to light, sclera clear, and conjunctiva normal  ENT: normocepalic, without obvious abnormality  Respiratory: No increased work of breathing, good air exchange, clear to auscultation bilaterally, no crackles or wheezing  Cardiovascular: S1-S2 intact.  No pitting edema noted lower extremity.  GI: Bowel sound  positive nontender nondistended.  Skin: no bruising or bleeding  Musculoskeletal: no lower extremity pitting edema present  Neurologic: Alert and oriented.  Limited English language uses    Medical Decision Making       25 MINUTES SPENT BY ME on the date of service doing chart review, history, exam, documentation & further activities per the note.      Data     I have personally reviewed the following data over the past 24 hrs:    5.7  \   8.0 (L)   / 283     140 106 28.5 (H) /  154 (H)   3.9 22 1.69 (H) \       Imaging results reviewed over the past 24 hrs:   No results found for this or any previous visit (from the past 24 hour(s)).

## 2023-08-12 NOTE — PROGRESS NOTES
Observation goals  PRIOR TO DISCHARGE       Comments:   -diagnostic tests and consults completed and resulted- Partially met   -vital signs normal or at patient baseline- Met   -tolerating oral intake to maintain hydration- Met   -adequate pain control on oral analgesics- Met   -returns to baseline functional status- Not met   -safe disposition plan has been identified- Not met

## 2023-08-12 NOTE — PROGRESS NOTES
Summary: Admitted for acute back pain and found to have subacute L2, L4, and L5 compression fractures.   Orientation/Cognitive: Disoriented to time and situation.   Observation Goals (Met/ Not Met): Not Met  Mobility Level/Assist Equipment: A1 /G//W.   Fall Risk (Y/N): Yes  Behavior Concerns: Green; confused  Pain Management: Julisa Tyl and PRN Robaxin.   Tele/VS/O2: Slightly elevated BP, other VSS on RA  ABNL Lab/BG: Creat 1.69, hgb 8.0, last   Diet: Mod CHO/Cardiac , no caffeine  Bowel/Bladder:Incontinent of bladder. Constipated, senna, miralax  and bisacodyl supp given, no results yet.Last BM yesterday, hard stool  Skin Concerns: WDL  Drains/Devices:LSO brace when OOB  Tests/Procedures for next shift: None   Anticipated DC date & active delays: TBD, SW following for placement  Patient Stated Goal for Today: Pain control

## 2023-08-13 LAB
ANION GAP SERPL CALCULATED.3IONS-SCNC: 12 MMOL/L (ref 7–15)
BUN SERPL-MCNC: 32.3 MG/DL (ref 8–23)
CALCIUM SERPL-MCNC: 9 MG/DL (ref 8.8–10.2)
CHLORIDE SERPL-SCNC: 106 MMOL/L (ref 98–107)
CREAT SERPL-MCNC: 1.87 MG/DL (ref 0.51–0.95)
DEPRECATED HCO3 PLAS-SCNC: 21 MMOL/L (ref 22–29)
GFR SERPL CREATININE-BSD FRML MDRD: 27 ML/MIN/1.73M2
GLUCOSE BLDC GLUCOMTR-MCNC: 168 MG/DL (ref 70–99)
GLUCOSE BLDC GLUCOMTR-MCNC: 190 MG/DL (ref 70–99)
GLUCOSE BLDC GLUCOMTR-MCNC: 256 MG/DL (ref 70–99)
GLUCOSE BLDC GLUCOMTR-MCNC: 306 MG/DL (ref 70–99)
GLUCOSE BLDC GLUCOMTR-MCNC: 313 MG/DL (ref 70–99)
GLUCOSE BLDC GLUCOMTR-MCNC: 326 MG/DL (ref 70–99)
GLUCOSE BLDC GLUCOMTR-MCNC: 351 MG/DL (ref 70–99)
GLUCOSE SERPL-MCNC: 180 MG/DL (ref 70–99)
POTASSIUM SERPL-SCNC: 4 MMOL/L (ref 3.4–5.3)
SODIUM SERPL-SCNC: 139 MMOL/L (ref 136–145)

## 2023-08-13 PROCEDURE — 80048 BASIC METABOLIC PNL TOTAL CA: CPT | Performed by: NURSE PRACTITIONER

## 2023-08-13 PROCEDURE — 250N000013 HC RX MED GY IP 250 OP 250 PS 637: Performed by: PHYSICIAN ASSISTANT

## 2023-08-13 PROCEDURE — 99231 SBSQ HOSP IP/OBS SF/LOW 25: CPT | Performed by: NURSE PRACTITIONER

## 2023-08-13 PROCEDURE — G0378 HOSPITAL OBSERVATION PER HR: HCPCS

## 2023-08-13 PROCEDURE — 82962 GLUCOSE BLOOD TEST: CPT

## 2023-08-13 PROCEDURE — 36415 COLL VENOUS BLD VENIPUNCTURE: CPT | Performed by: NURSE PRACTITIONER

## 2023-08-13 PROCEDURE — 250N000013 HC RX MED GY IP 250 OP 250 PS 637: Performed by: STUDENT IN AN ORGANIZED HEALTH CARE EDUCATION/TRAINING PROGRAM

## 2023-08-13 PROCEDURE — 250N000013 HC RX MED GY IP 250 OP 250 PS 637: Performed by: NURSE PRACTITIONER

## 2023-08-13 RX ADMIN — ACETAMINOPHEN 975 MG: 325 TABLET, FILM COATED ORAL at 16:29

## 2023-08-13 RX ADMIN — ACETAMINOPHEN 975 MG: 325 TABLET, FILM COATED ORAL at 08:12

## 2023-08-13 RX ADMIN — INSULIN ASPART 4 UNITS: 100 INJECTION, SOLUTION INTRAVENOUS; SUBCUTANEOUS at 12:12

## 2023-08-13 RX ADMIN — SENNOSIDES AND DOCUSATE SODIUM 2 TABLET: 50; 8.6 TABLET ORAL at 19:48

## 2023-08-13 RX ADMIN — INSULIN ASPART 5 UNITS: 100 INJECTION, SOLUTION INTRAVENOUS; SUBCUTANEOUS at 17:28

## 2023-08-13 RX ADMIN — ACETAMINOPHEN 975 MG: 325 TABLET, FILM COATED ORAL at 00:22

## 2023-08-13 RX ADMIN — SENNOSIDES AND DOCUSATE SODIUM 2 TABLET: 50; 8.6 TABLET ORAL at 08:11

## 2023-08-13 RX ADMIN — BISACODYL 10 MG: 10 SUPPOSITORY RECTAL at 18:46

## 2023-08-13 RX ADMIN — METHOCARBAMOL 500 MG: 500 TABLET ORAL at 16:29

## 2023-08-13 RX ADMIN — INSULIN ASPART 2 UNITS: 100 INJECTION, SOLUTION INTRAVENOUS; SUBCUTANEOUS at 08:14

## 2023-08-13 RX ADMIN — EMPAGLIFLOZIN 10 MG: 10 TABLET, FILM COATED ORAL at 08:11

## 2023-08-13 RX ADMIN — LIDOCAINE 2 PATCH: 560 PATCH PERCUTANEOUS; TOPICAL; TRANSDERMAL at 08:12

## 2023-08-13 RX ADMIN — POLYETHYLENE GLYCOL 3350 17 G: 17 POWDER, FOR SOLUTION ORAL at 08:13

## 2023-08-13 RX ADMIN — INSULIN GLARGINE 10 UNITS: 100 INJECTION, SOLUTION SUBCUTANEOUS at 08:14

## 2023-08-13 RX ADMIN — PRAVASTATIN SODIUM 20 MG: 20 TABLET ORAL at 21:54

## 2023-08-13 RX ADMIN — ASPIRIN 81 MG 81 MG: 81 TABLET ORAL at 08:11

## 2023-08-13 ASSESSMENT — ACTIVITIES OF DAILY LIVING (ADL)
ADLS_ACUITY_SCORE: 43
ADLS_ACUITY_SCORE: 47
ADLS_ACUITY_SCORE: 43
ADLS_ACUITY_SCORE: 47
ADLS_ACUITY_SCORE: 43
ADLS_ACUITY_SCORE: 46
ADLS_ACUITY_SCORE: 47
ADLS_ACUITY_SCORE: 46

## 2023-08-13 NOTE — PLAN OF CARE
Goal Outcome Evaluation:  Orientation/Cognitive: A&O x2, disoriented to time and situation  Observation Goals (Met/ Not Met): Not Met  Mobility Level/Assist Equipment: Ax1 Gb/W, LSO brace when OOB   Fall Risk (Y/N): Yes  Behavior Concerns: Green; confused  Pain Management: Julisa Tylenol, heat pack applied  Tele/VS/O2: VSS on RA except HTN   ABNL Lab/BG: BG = 265, 168  Diet: Mod CHO, no caffeine  Bowel/Bladder: Incontinent of bladder   Skin Concerns: WDL  Drains/Devices: PIV SL   Tests/Procedures for next shift: SW following for placement  Anticipated DC date & active delays: TBD   Patient Stated Goal for Today: Sleep          Observation goals  PRIOR TO DISCHARGE       Comments:   -diagnostic tests and consults completed and resulted- Partially met   -vital signs normal or at patient baseline- Met   -tolerating oral intake to maintain hydration- Met   -adequate pain control on oral analgesics- Met   -returns to baseline functional status- Not met   -safe disposition plan has been identified- Not met

## 2023-08-13 NOTE — PROGRESS NOTES
[facility]    Medicine Progress Note - Hospitalist Service    Date of Admission:  8/9/2023    Assessment & Plan   June Shultz is a Cameroonian language speaking 82-year-old female with PMH of urinary incontinence, CKD stage III, underlying cognitive impairment, memory issues, osteoarthritis, chronic microcytic hypochromic anemia, history of CVA involving chronic left basal ganglia/corona and deconditioning who was admitted on August 9, 2023 with acute back pain, recent falls, abdominal pain and recent PVC.  Patient is seen today for a follow-up medical management.    Acute back pain  Incidental subacute L 2, L4 and L5 compression fractures  Recent falls  Deconditioning  -MRI of lumbar spine  -IMPRESSION:    1. Recent L2 inferior endplate compression fracture with mild height  loss.  2. Mild compression deformities at L4 and L5 with associated marrow  edema which is favored to be related to recent fracture and/or  degeneration, however infection cannot be excluded.  3. Abnormal signal within the L4-5 disc probably  degenerative/posttraumatic, however infection cannot be excluded.  4. Mild bilateral paraspinal muscular edema/fluid stranding,  presumably posttraumatic.  5. Multilevel degenerative change  -Head CT scan without contrast  IMPRESSION:  1. No CT findings of acute intracranial process.  2. 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  3. Brain atrophy and presumed chronic small vessel ischemic changes,  as described.  -Continue with fall precaution  -Continue with current pain management not reported at this visit  -Avoid narcotic.  -Currently pain is controlled.  -Continue to use back brace.  -Per physical therapy recommendation TCU   -Per care manager discharge on Monday, due to placement issue.  -Hospital medicine service to follow-up in AM.    Chronic kidney disease stage III with creatinine baseline between 1.55 and 1.9  -Reviewed BMP this morning and  noted creatinine at 1.87.  -Push oral fluid.  -Hospital medicine to follow-up in AM.    Abdominal pain most likely secondary to constipation  -Currently improved.  -Continue with bowel program.    Elevated blood pressure without underlying diagnosis of hypertension  -Consider due to pain and hospitalization.  -Much better this morning with blood pressure 139/68 with a heart rate of 65.  -Continue with pain management and reassurance.  -Continue with as needed hydralazine per current parameters.  -Unlikely she will need medication, after discharge.    Recent PVC  Irregular rhythm  EKG sinus rhythm with PVC  Normal magnesium  -Currently with heart rate within normal range.  -Remained asymptomatic.  -Patient to follow-up with her primary doctor, after discharge.    Diabetes type 2 insulin-dependent poorly controlled with A1c at 12.9% noted 3 months ago was 14.5%.  -Blood glucose running between 102-250  -Continue with current sliding scale insulin  -Added parameter, hold for blood glucose < 100 for the  2 unit insulin bid.  -Recommend for patient to follow-up with her primary doctor for better management of diabetes type 2.  Noted improvement recently.    History of altered mental status none noted today.  Cognitive impairment/memory loss  -Per chart review noted by primary doctor.  -PT evaluation -Recommend TCU     Osteoarthritis  History of CVA  Chronic microcytic hypochromic anemia  B12 deficiency, iron deficiency  -Laboratory data reviewed today and noted folic acid 12.7  -Vitamin B12 at 313  -Hemoglobin 8.0 today baseline  -No sign of bleeding  -History of CVA fall precaution ongoing.  -Continue to follow-up.         Diet: Combination Diet Moderate Consistent Carb (60 g CHO per Meal) Diet; No Caffeine Diet, Low Saturated Fat Na <2400mg Diet    DVT Prophylaxis: Pneumatic Compression Devices and Ambulate every shift  Sotelo Catheter: Not present  Lines: None     Cardiac Monitoring: None  Code Status: Full Code       Clinically Significant Risk Factors Present on Admission                # Drug Induced Platelet Defect: home medication list includes an antiplatelet medication       # DMII: A1C = 12.9 % (Ref range: <5.7 %) within past 6 months            Disposition Plan  TCU awaiting placement.     Expected Discharge Date: 08/14/2023      Destination: home with family;home with help/services  Discharge Comments: PT- TCU  SW- referals sent out    pt was accepted [facility] TCU with memory care for 8/14  ML - no available beds  [facility] ?        The patient's care was discussed with the Attending Physician, Dr. Jesse Hathaway, Bedside Nurse, Patient, and interdisciplinary care Team.    NORMAN [facility]  Hospitalist Service  [facility]  Securely message with [employer] (more info)  Text page via [employer] Paging/Directory   ______________________________________________________________________    Interval History   Do, June is a pleasant 82 years old Mongolian language speaking female seen in the room sitting in a regular chair eating breakfast.  Via the video  line for Mongolian language utilized to communicate with patient this morning.  Patient denies any ongoing pain, shortness of breath, chest pain, dizziness, nausea or vomiting.  She states her back pain is still there, but better with the back brace.  She denies ongoing abdominal pain.  Patient is medically stable and awaiting placement recommended by physical therapy TCU discharge.      Physical Exam   Vital Signs: Temp: 98.3  F (36.8  C) Temp src: Oral BP: 139/68 Pulse: 65   Resp: 18 SpO2: 97 % O2 Device: None (Room air)    Weight: 104 lbs 4.44 oz    Constitutional: awake, alert, cooperative, no apparent distress, and appears stated age  Eyes: lids and lashes normal, pupils equal, round and reactive to light, sclera clear, and conjunctiva normal  Respiratory: No increased work of breathing, good air exchange, clear  to auscultation bilaterally, no crackles or wheezing  Cardiovascular: S1-S2 intact.  No pitting edema noted lower extremity.  Circulation is intact.  GI: Bowel sound positive nontender nondistended.  Skin: no rashes  Musculoskeletal: no lower extremity pitting edema present  Neurologic: No agitation or confusion noted.  Via the , she answered questions appropriately.    Medical Decision Making       25 MINUTES SPENT BY ME on the date of service doing chart review, history, exam, documentation & further activities per the note.      Data   Recent Labs   Lab 08/13/23  1238 08/13/23  1211 08/13/23  0732 08/13/23  0643 08/12/23  0729 08/12/23  0613 08/11/23  1732 08/11/23  1319 08/11/23  1236 08/11/23  0905 08/10/23  0636 08/10/23  0622 08/09/23  1654 08/09/23  0742   WBC  --   --   --   --   --  5.7  --   --   --   --   --  4.9  --  6.8   HGB  --   --   --   --   --  8.0*  --  8.1*  --   --   --  7.7*  --  8.5*   MCV  --   --   --   --   --  63*  --   --   --   --   --  65*  --  65*   PLT  --   --   --   --   --  283  --   --   --   --   --  222  --  225   NA  --   --   --  139  --  140  --   --   --  139  --  137  --  137   POTASSIUM  --   --   --  4.0  --  3.9  --   --   --  3.7  --  4.2  --  3.9   CHLORIDE  --   --   --  106  --  106  --   --   --  106  --  105  --  102   CO2  --   --   --  21*  --  22  --   --   --  21*  --  21*  --  24   BUN  --   --   --  32.3*  --  28.5*  --   --   --  30.1*  --  36.4*  --  26.3*   CR  --   --   --  1.87*  --  1.69*  --   --   --  1.62*  --  1.93*  --  1.78*   ANIONGAP  --   --   --  12  --  12  --   --   --  12  --  11  --  11   MANUEL  --   --   --  9.0  --  8.7*  --   --   --  8.7*  --  8.6*  --  9.1   * 313* 190* 180*   < > 111*   < >  --    < > 131*   < > 290*   < > 367*   ALBUMIN  --   --   --   --   --   --   --   --   --   --   --   --   --  3.7   PROTTOTAL  --   --   --   --   --   --   --   --   --   --   --   --   --  6.8   BILITOTAL  --   --   --   --    --   --   --   --   --   --   --   --   --  0.3   ALKPHOS  --   --   --   --   --   --   --   --   --   --   --   --   --  129*   ALT  --   --   --   --   --   --   --   --   --   --   --   --   --  <5   AST  --   --   --   --   --   --   --   --   --   --   --   --   --  9    < > = values in this interval not displayed.

## 2023-08-13 NOTE — PROGRESS NOTES
Summary: Admitted for acute back pain and found to have subacute L2, L4, and L5 compression fractures.   Orientation/Cognitive: Disoriented to time and situation.   Observation Goals (Met/ Not Met): Not Met  Mobility Level/Assist Equipment: A1 /G//W.   Fall Risk (Y/N): Yes  Behavior Concerns: Green  Pain Management: Julisa Tyl and PRN Robaxin.   Tele/VS/O2: Slightly elevated BP, other VSS on RA  ABNL Lab/BG: Creat 1.87, hgb 8.0, elevated BG as the family is bringing in food from home and giving to patient.Last   Diet: Mod CHO/Cardiac , no caffeine  Bowel/Bladder:Incontinent of bladder. Senna, miralax  and bisacodyl supp given, no results yet.Last BM on 8/12  Skin Concerns: WDL  Drains/Devices:LSO brace when OOB  Tests/Procedures for next shift: None   Anticipated DC date & active delays: TBD, SW following for placement  Patient Stated Goal for Today: Pain control

## 2023-08-14 VITALS
RESPIRATION RATE: 16 BRPM | TEMPERATURE: 98.5 F | HEART RATE: 71 BPM | SYSTOLIC BLOOD PRESSURE: 145 MMHG | BODY MASS INDEX: 16.15 KG/M2 | DIASTOLIC BLOOD PRESSURE: 67 MMHG | WEIGHT: 88.3 LBS | OXYGEN SATURATION: 97 %

## 2023-08-14 LAB
GLUCOSE BLDC GLUCOMTR-MCNC: 156 MG/DL (ref 70–99)
GLUCOSE BLDC GLUCOMTR-MCNC: 170 MG/DL (ref 70–99)
GLUCOSE BLDC GLUCOMTR-MCNC: 177 MG/DL (ref 70–99)

## 2023-08-14 PROCEDURE — G0378 HOSPITAL OBSERVATION PER HR: HCPCS

## 2023-08-14 PROCEDURE — 250N000013 HC RX MED GY IP 250 OP 250 PS 637: Performed by: PHYSICIAN ASSISTANT

## 2023-08-14 PROCEDURE — 82962 GLUCOSE BLOOD TEST: CPT

## 2023-08-14 PROCEDURE — 99239 HOSP IP/OBS DSCHRG MGMT >30: CPT | Performed by: STUDENT IN AN ORGANIZED HEALTH CARE EDUCATION/TRAINING PROGRAM

## 2023-08-14 PROCEDURE — 250N000013 HC RX MED GY IP 250 OP 250 PS 637: Performed by: STUDENT IN AN ORGANIZED HEALTH CARE EDUCATION/TRAINING PROGRAM

## 2023-08-14 RX ORDER — PRAVASTATIN SODIUM 20 MG
20 TABLET ORAL AT BEDTIME
DISCHARGE
Start: 2023-08-14

## 2023-08-14 RX ORDER — OXYCODONE HYDROCHLORIDE 5 MG/1
2.5 TABLET ORAL EVERY 4 HOURS PRN
Qty: 30 TABLET | Refills: 0 | Status: ON HOLD | OUTPATIENT
Start: 2023-08-14 | End: 2023-09-29

## 2023-08-14 RX ORDER — ACETAMINOPHEN 325 MG/1
975 TABLET ORAL EVERY 8 HOURS
DISCHARGE
Start: 2023-08-14

## 2023-08-14 RX ORDER — AMOXICILLIN 250 MG
1 CAPSULE ORAL DAILY
DISCHARGE
Start: 2023-08-14

## 2023-08-14 RX ADMIN — EMPAGLIFLOZIN 10 MG: 10 TABLET, FILM COATED ORAL at 08:29

## 2023-08-14 RX ADMIN — LIDOCAINE 2 PATCH: 560 PATCH PERCUTANEOUS; TOPICAL; TRANSDERMAL at 08:30

## 2023-08-14 RX ADMIN — SENNOSIDES AND DOCUSATE SODIUM 2 TABLET: 50; 8.6 TABLET ORAL at 08:29

## 2023-08-14 RX ADMIN — ASPIRIN 81 MG 81 MG: 81 TABLET ORAL at 08:29

## 2023-08-14 RX ADMIN — INSULIN GLARGINE 10 UNITS: 100 INJECTION, SOLUTION SUBCUTANEOUS at 08:32

## 2023-08-14 RX ADMIN — ACETAMINOPHEN 975 MG: 325 TABLET, FILM COATED ORAL at 00:20

## 2023-08-14 RX ADMIN — INSULIN ASPART 1 UNITS: 100 INJECTION, SOLUTION INTRAVENOUS; SUBCUTANEOUS at 12:50

## 2023-08-14 RX ADMIN — INSULIN ASPART 1 UNITS: 100 INJECTION, SOLUTION INTRAVENOUS; SUBCUTANEOUS at 08:32

## 2023-08-14 RX ADMIN — ACETAMINOPHEN 975 MG: 325 TABLET, FILM COATED ORAL at 08:29

## 2023-08-14 ASSESSMENT — ACTIVITIES OF DAILY LIVING (ADL)
ADLS_ACUITY_SCORE: 46
ADLS_ACUITY_SCORE: 47
ADLS_ACUITY_SCORE: 46
ADLS_ACUITY_SCORE: 47
ADLS_ACUITY_SCORE: 46

## 2023-08-14 NOTE — DISCHARGE SUMMARY
[facility]  Hospitalist Discharge Summary      Date of Admission:  8/9/2023  Date of Discharge:  8/14/2023  Discharging Provider: Pollo Barillas MD  Discharge Service: Hospitalist Service    Discharge Diagnoses     Acute back pain  Incidental subacute L 2, L4 and L5 compression fractures  Recent falls  Deconditioning    Clinically Significant Risk Factors     # DMII: A1C = 12.9 % (Ref range: <5.7 %) within past 6 months       Follow-ups Needed After Discharge   Follow-up Appointments     Follow Up and recommended labs and tests      Follow up with Nursing home physician.  No follow up labs or test are   needed.            Unresulted Labs Ordered in the Past 30 Days of this Admission       No orders found from 7/10/2023 to 8/10/2023.          Discharge Disposition   Discharged to rehabilitation facility  Condition at discharge: Stable    Hospital Course      June Shultz is a [place]itanian language speaking 82-year-old female with PMH of urinary incontinence, CKD stage III, underlying cognitive impairment, memory issues, osteoarthritis, chronic microcytic hypochromic anemia, history of CVA involving chronic left basal ganglia/corona and deconditioning who was admitted on August 9, 2023 with acute back pain, recent falls, abdominal pain and recent PVC.  Patient is seen today for a follow-up medical management.     Acute back pain  Incidental subacute L 2, L4 and L5 compression fractures  Recent falls  Deconditioning  -MRI of lumbar spine  -IMPRESSION:    1. Recent L2 inferior endplate compression fracture with mild height  loss.  2. Mild compression deformities at L4 and L5 with associated marrow  edema which is favored to be related to recent fracture and/or  degeneration, however infection cannot be excluded.  3. Abnormal signal within the L4-5 disc probably  degenerative/posttraumatic, however infection cannot be excluded.  4. Mild bilateral paraspinal muscular edema/fluid stranding,  presumably  posttraumatic.  5. Multilevel degenerative change  -Head CT scan without contrast  IMPRESSION:  1. No CT findings of acute intracranial process.  2. 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  3. Brain atrophy and presumed chronic small vessel ischemic changes,  as described.  -Continue with fall precautions  -Continue with current pain management not reported at this visit  -Avoid narcotics as able  -Currently pain is controlled.  -Continue to use back brace.  -Per physical therapy recommendation TCU      Chronic kidney disease stage III with creatinine baseline between 1.55 and 1.9  -Hospital medicine to follow-up in AM -> Cr stable     Abdominal pain most likely secondary to constipation  -Currently improved.       Elevated blood pressure without underlying diagnosis of hypertension  -Consider due to pain and hospitalization.  -Much better this morning with blood pressure 139/68 with a heart rate of 65.     Recent PVC  Irregular rhythm  EKG sinus rhythm with PVC  Normal magnesium  -Currently with heart rate within normal range.  -Remained asymptomatic.  -Patient to follow-up with her primary doctor, after discharge.     Diabetes type 2 insulin-dependent poorly controlled with A1c at 12.9% noted 3 months ago was 14.5%.  -Blood glucose running between 102-250  -Recommend for patient to follow-up with her primary doctor for better management of diabetes type 2.  Noted improvement recently.  -Continue PTA Lantus     History of altered mental status none noted today.  Cognitive impairment/memory loss  -Per chart review noted by primary doctor.  -PT evaluation -> Recommend TCU      Osteoarthritis  History of CVA  Chronic microcytic hypochromic anemia  B12 deficiency, iron deficiency  -Laboratory data reviewed today and noted folic acid 12.7  -Vitamin B12 at 313  -Hemoglobin baseline  -No sign of bleeding  -History of CVA fall precaution ongoing.       Consultations This Hospital Stay    NEUROSURGERY IP CONSULT  CARE MANAGEMENT / SOCIAL WORK IP CONSULT  PHYSICAL THERAPY ADULT IP CONSULT  ORTHOSIS BRACE IP CONSULT  PHYSICAL THERAPY ADULT IP CONSULT    Code Status   Full Code    Time Spent on this Encounter   I, Pollo Barillas MD, personally saw the patient today and spent greater than 30 minutes discharging this patient.       Pollo Barillas MD  [facility] EXTENDED RECOVERY AND SHORT STAY  [address] [address]  Phone: [phone]  ______________________________________________________________________    Physical Exam   Vital Signs: Temp: 98.1  F (36.7  C) Temp src: Oral BP: (!) 146/73 Pulse: 69   Resp: 16 SpO2: 96 % O2 Device: None (Room air)    Weight: 88 lbs 4.8 oz  ----------------------------------------------------------------------------------------       Primary Care Physician   Cordelia Clark    Discharge Orders      X-ray lumbar spine 2-3 views*     General info for SNF    Length of Stay Estimate: Short Term Care: Estimated # of Days <30  Condition at Discharge: Stable  Level of care:skilled   Rehabilitation Potential: Fair  Admission H&P remains valid and up-to-date: Yes  Recent Chemotherapy: N/A  Use Nursing Home Standing Orders: Yes     Mantoux instructions    Give two-step Mantoux (PPD) Per Facility Policy Yes     Follow Up and recommended labs and tests    Follow up with Nursing home physician.  No follow up labs or test are needed.     Reason for your hospital stay    You had uncontrolled back pain and recurrent falls.     Intake and output    Every shift     Daily weights    Call Provider for weight gain of more than 2 pounds per day or 5 pounds per week.     Activity - Up with assistive device     Full Code     Physical Therapy Adult Consult    Evaluate and treat as clinically indicated.    Reason:  physical deconditioning     Fall precautions     Diet    Follow this diet upon discharge: Orders Placed This Encounter      Combination Diet  Moderate Consistent Carb (60 g CHO per Meal) Diet; No Caffeine Diet, Low Saturated Fat Na <2400mg Diet       Significant Results and Procedures   Most Recent 3 CBC's:  Recent Labs   Lab Test 08/12/23  0613 08/11/23  1319 08/10/23  0622 08/09/23  0742   WBC 5.7  --  4.9 6.8   HGB 8.0* 8.1* 7.7* 8.5*   MCV 63*  --  65* 65*     --  222 225     Most Recent 3 BMP's:  Recent Labs   Lab Test 08/14/23  0636 08/14/23  0210 08/13/23  2118 08/13/23  0732 08/13/23  0643 08/12/23  0729 08/12/23  0613 08/11/23  1236 08/11/23  0905   NA  --   --   --   --  139  --  140  --  139   POTASSIUM  --   --   --   --  4.0  --  3.9  --  3.7   CHLORIDE  --   --   --   --  106  --  106  --  106   CO2  --   --   --   --  21*  --  22  --  21*   BUN  --   --   --   --  32.3*  --  28.5*  --  30.1*   CR  --   --   --   --  1.87*  --  1.69*  --  1.62*   ANIONGAP  --   --   --   --  12  --  12  --  12   MANUEL  --   --   --   --  9.0  --  8.7*  --  8.7*   * 156* 256*   < > 180*   < > 111*   < > 131*    < > = values in this interval not displayed.     Most Recent 2 LFT's:  Recent Labs   Lab Test 08/09/23  0742 05/13/23  1951   AST 9 24   ALT <5 10   ALKPHOS 129* 66   BILITOTAL 0.3 <0.2     Most Recent 3 INR's:No lab results found.  Most Recent 3 Troponin's:No lab results found.  Most Recent 3 BNP's:  Recent Labs   Lab Test 02/25/23  0759   NTBNPI 251     7-Day Micro Results       No results found for the last 168 hours.          Most Recent Hemoglobin A1c:  Recent Labs   Lab Test 08/10/23  0622   A1C 12.9*     Most Recent 6 glucoses:  Recent Labs   Lab Test 08/14/23  0636 08/14/23  0210 08/13/23  2118 08/13/23  1850 08/13/23  1718 08/13/23  1238   * 156* 256* 306* 351* 326*     Most Recent Urinalysis:  Recent Labs   Lab Test 08/09/23  0743   COLOR Straw   APPEARANCE Clear   URINEGLC >=1000*   URINEBILI Negative   URINEKETONE Negative   SG 1.011   UBLD Negative   URINEPH 6.0   PROTEIN 30*   NITRITE Negative   LEUKEST Negative   RBCU  1   WBCU <1   ,   Results for orders placed or performed during the hospital encounter of 08/09/23   Head CT w/o contrast    Narrative    CT SCAN OF THE HEAD WITHOUT CONTRAST   8/9/2023 9:07 AM     HISTORY: Altered mental status.    TECHNIQUE:  Axial images of the head and coronal reformations without  IV contrast material. Radiation dose for this scan was reduced using  automated exposure control, adjustment of the mA and/or kV according  to patient size, or iterative reconstruction technique.    COMPARISON: CT head 5/13/2023.    FINDINGS: Unchanged chronic infarct in the left basal ganglia/corona  radiata region. Elsewhere, mild patchy nonspecific hypoattenuation of  the cerebral white matter appears similar to prior, likely  representing chronic small vessel ischemic changes. Mild to moderate  generalized cerebral volume loss and mild generalized cerebral volume  loss. No CT findings of large transcortical acute or subacute infarct.  No acute intracranial hemorrhage, extra-axial fluid collection, or  mass effect. The ventricles are normal in size and configuration.  Scattered intracranial atherosclerotic calcifications are noted.    The visualized portions of the sinuses and mastoids appear normal. The  bony calvarium and bones of the skull base appear intact.       Impression    IMPRESSION:  1. No CT findings of acute intracranial process.  2. 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  3. Brain atrophy and presumed chronic small vessel ischemic changes,  as described.    RATNA AVILES MD         SYSTEM ID:  [id number]   Lumbar spine MRI w/o contrast    Narrative    MRI LUMBAR SPINE WITHOUT CONTRAST   8/9/2023 4:25 PM     HISTORY: Low back pain - loss of control of bladder.    TECHNIQUE: Multiplanar multisequence MRI of the lumbar spine without  contrast.    COMPARISON: Abdomen and pelvis CT 8/9/2023.     FINDINGS:  Recent fracture involving the L2 inferior endplate with linear  T1  hypointense signal and T2 hyperintense signal with probable subtle  correlate in retrospect on the same day of abdomen and pelvis CT.    Mild height loss involving the L4 and L5 vertebral bodies with  nonspecific T1 hypointense T2 hyperintense signal. Abnormal T2  hyperintense signal within the L4-5 disc.    Alignment is significant for multilevel subtle grade 1  spondylolisthesis. Conus medullaris is unremarkable terminating at the  level of the L1-2 disc. Cauda equina is unremarkable. Nonspecific T2  hyperintense lesions within the left kidney which are incompletely  characterized but statistically likely benign cysts. Bilateral  sacroiliac joint degenerative change. Mild bilateral paraspinal  muscular edema/fluid stranding, presumably posttraumatic.    Segmental Analysis:     T12-L1:  Disc height maintained. No herniation. Moderate right and  mild left facet arthropathy. No foraminal or spinal canal stenosis.     L1-L2:  Disc height maintained. No herniation. Mild bilateral facet  arthropathy. No foraminal or spinal canal stenosis.      L2-L3:  Mild disc height loss. Minimal bulge. Mild bilateral facet  arthropathy. No right foraminal stenosis. Mild if any left foraminal  stenosis. No spinal canal stenosis.      L3-L4:  Disc height maintained. T2 hyperintense signal within the  anterior disc, presumably degenerative fissure. Mild to moderate  bilateral facet arthropathy. No foraminal or spinal canal stenosis.    L4-L5:  Disc height maintained. Disc bulge with bilateral foraminal  components. Abnormal T2 hyperintense signal within the disc. Mild to  moderate bilateral facet arthropathy. Moderate bilateral foraminal  stenosis. Mild if any spinal canal stenosis.    L5-S1:  Disc height maintained. Right central annular fissure.  Moderate bilateral facet arthropathy. No right foraminal stenosis.  Mild left foraminal stenosis. No spinal canal stenosis.      Impression    IMPRESSION:    1. Recent L2 inferior  endplate compression fracture with mild height  loss.  2. Mild compression deformities at L4 and L5 with associated marrow  edema which is favored to be related to recent fracture and/or  degeneration, however infection cannot be excluded.  3. Abnormal signal within the L4-5 disc probably  degenerative/posttraumatic, however infection cannot be excluded.  4. Mild bilateral paraspinal muscular edema/fluid stranding,  presumably posttraumatic.  5. Multilevel degenerative change.    BREANN ENAMORADO MD         SYSTEM ID:  [id number]   CT Abdomen Pelvis w/o Contrast    Narrative    CT ABDOMEN PELVIS W/O CONTRAST 8/9/2023 9:07 AM    CLINICAL HISTORY: Abdominal pain.  abdominal pain periumbilical   TECHNIQUE: CT scan of the abdomen and pelvis was performed without IV  contrast. Multiplanar reformats were obtained. Dose reduction  techniques were used.  CONTRAST: None.    COMPARISON: None.    FINDINGS:   LOWER CHEST: Heart size is within normal limits. No pericardial  effusion. Partially imaged coronary artery calcifications. There is a  4 mm solid pulmonary nodule of the right middle lobe (series 3, image  7). Additional 3 mm solid nodule in the lingula (series 3, image 10).    HEPATOBILIARY: No significant mass or bile duct dilatation. No  calcified gallstones.     PANCREAS: No significant mass, duct dilatation, or inflammatory  change.    SPLEEN: Normal size.    ADRENAL GLANDS: No significant nodules.    KIDNEYS/BLADDER: No significant mass, stones, or hydronephrosis. Left  greater than right mild renal atrophy. Distended urinary bladder.    BOWEL: No obstruction or inflammatory change. Moderate colonic stool  burden.    VASCULATURE: No abdominal aortic aneurysm. Moderate burden of calcific  atherosclerosis.    PELVIC ORGANS: No adnexal mass.    OTHER: No free air or free fluid.    MUSCULOSKELETAL: Multilevel degenerative changes of the spine.  Age-indeterminate superior endplate fracture at L5 and inferior  endplate  fracture at L2 and L4 with mild height loss, favoring remote.      Impression    IMPRESSION:   1.  No acute findings within the abdomen or pelvis by noncontrast CT.  2.  Age-indeterminate endplate fractures at L2, L4, and L5, favored  remote.  3.  Small bilateral pulmonary nodules. CT follow-up recommendations  below.    REFERENCE:  Guidelines for Management of Incidental Pulmonary Nodules Detected on  CT Images: From the Fleischner Society 2017.   Guidelines apply to incidental nodules in patients who are 35 years or  older.  Guidelines do not apply to lung cancer screening, patients with  immunosuppression, or patients with known primary cancer.    MULTIPLE NODULES  Nodule size <6 mm  Low-risk patients: No follow-up needed.  High-risk patients: Optional follow-up at 12 months.    TERRY ROBLES MD         SYSTEM ID:  [id number]       Discharge Medications   Current Discharge Medication List        START taking these medications    Details   acetaminophen (TYLENOL) 325 MG tablet Take 3 tablets (975 mg) by mouth every 8 hours    Associated Diagnoses: Compression fracture of L5 lumbar vertebra, closed, initial encounter (H)      oxyCODONE (ROXICODONE) 5 MG tablet Take 0.5 tablets (2.5 mg) by mouth every 4 hours as needed for moderate pain  Qty: 30 tablet, Refills: 0    Associated Diagnoses: Compression fracture of L5 lumbar vertebra, closed, initial encounter (H)      pravastatin (PRAVACHOL) 20 MG tablet Take 1 tablet (20 mg) by mouth At Bedtime    Associated Diagnoses: Compression fracture of L5 lumbar vertebra, closed, initial encounter (H)      senna-docusate (SENOKOT-S/PERICOLACE) 8.6-50 MG tablet Take 1 tablet by mouth daily    Associated Diagnoses: Compression fracture of L5 lumbar vertebra, closed, initial encounter (H)           CONTINUE these medications which have NOT CHANGED    Details   aspirin (ASA) 81 MG chewable tablet Take 81 mg by mouth daily      brimonidine-timolol (COMBIGAN) 0.2-0.5 % ophthalmic  solution Place 1 drop into both eyes 2 times daily      docusate sodium (COLACE) 100 MG capsule Take 100 mg by mouth daily      empagliflozin (JARDIANCE) 10 MG TABS tablet Take 10 mg by mouth daily      Ferrous Gluconate 324 (37.5 Fe) MG TABS Take 324 mg by mouth daily      insulin glargine (LANTUS PEN) 100 UNIT/ML pen Inject 10 Units Subcutaneous every morning (before breakfast)  Qty: 15 mL    Comments: If Lantus is not covered by insurance, may substitute Basaglar or Semglee or other insulin glargine product per insurance preference at same dose and frequency.    Associated Diagnoses: Severe diabetic hypoglycemia (H)      insulin lispro (HUMALOG KWIKPEN) 100 UNIT/ML (1 unit dial) KWIKPEN Inject 2 Units Subcutaneous 2 times daily (before meals)      levocetirizine (XYZAL) 5 MG tablet Take 5 mg by mouth every evening      vitamin D3 (CHOLECALCIFEROL) 50 mcg (2000 units) tablet Take 1 tablet by mouth daily      darbepoetin claudette (ARANESP, ALBUMIN FREE,) 60 MCG/0.3ML injection Inject 60 mcg Subcutaneous every 14 days           STOP taking these medications       lovastatin (MEVACOR) 20 MG tablet Comments:   Reason for Stopping:             Allergies   No Known Allergies

## 2023-08-14 NOTE — PROGRESS NOTES
Observation goals  PRIOR TO DISCHARGE       Comments:   -diagnostic tests and consults completed and resulted- met   -vital signs normal or at patient baseline- Met   -tolerating oral intake to maintain hydration- Met   -adequate pain control on oral analgesics- Met   -returns to baseline functional status- Not met   -safe disposition plan has been identified- in progress

## 2023-08-14 NOTE — PLAN OF CARE
Physical Therapy Discharge Summary    Reason for therapy discharge:    Discharged to transitional care facility.    Progress towards therapy goal(s). See goals on Care Plan in [facility] electronic health record for goal details.  Goals partially met.  Barriers to achieving goals:   discharge from facility.    Therapy recommendation(s):    Continued therapy is recommended.  Rationale/Recommendations: Pt below baseline mobility. Reportedly lives in an apartment w/ daughter. Unclear if uses SEC vs FWW at home. Per RN report Pts mattress on floor at home. Pt's daughter reportedly gone most of the day at work. At this time would recommend TCU at DC to improve upon functional mobility and strengthening as Pt unable to place brace herself or get OOB. Also limited by pain. If were to DC home would recommend 24/7 assist for mobility w/ HHPT.  PT Brief overview of current status: SBA w/ SEC vs FWW, Min A bed mobility    Recommendation above provided by last treating therapist.

## 2023-08-14 NOTE — PROGRESS NOTES
Observation goals  PRIOR TO DISCHARGE       Comments:   -diagnostic tests and consults completed and resulted- met   -vital signs normal or at patient baseline- Met   -tolerating oral intake to maintain hydration- Met   -adequate pain control on oral analgesics- Met   -returns to baseline functional status- Not met   -safe disposition plan has been identified- met

## 2023-08-14 NOTE — PLAN OF CARE
Goal Outcome Evaluation:      Plan of Care Reviewed With: patient    Overall Patient Progress: improvingOverall Patient Progress: improving     Goal Outcome Evaluation:  Orientation/Cognitive: A&O x2, disoriented to time and situation  Observation Goals (Met/ Not Met): Not Met  Mobility Level/Assist Equipment: Ax1 Gb/W, LSO brace when OOB   Fall Risk (Y/N): Yes  Behavior Concerns: Green  Pain Management: Julisa Tylenol, Lso brace for comfort  Tele/VS/O2: VSS on RA except HTN   ABNL Lab/BG: BG = 170,177  Diet: Mod CHO, no caffeine  Bowel/Bladder: Incontinent of bladder   Skin Concerns: WDL  Drains/Devices: PIV SL   Tests/Procedures for next shift: SW follow up  Anticipated DC date & active delays: 8/14  Patient Stated Goal for Today: discharge    Patient discharged to [facility] TCU, report given to RNDella. Patient transported to TCU via son-in-law, who also was given discharge packet (will give to RN at TCU). All belongings sent with patient, including LSO brace. No acute concerns upon discharge.

## 2023-08-14 NOTE — PLAN OF CARE
Goal Outcome Evaluation:  Orientation/Cognitive: A&O x2, disoriented to time and situation  Observation Goals (Met/ Not Met): Not Met  Mobility Level/Assist Equipment: Ax1 Gb/W, LSO brace when OOB   Fall Risk (Y/N): Yes  Behavior Concerns: Green  Pain Management: Julisa Tylenol, heat pack applied  Tele/VS/O2: VSS on RA except HTN   ABNL Lab/BG: BG = 256,156, 170   Diet: Mod CHO, no caffeine  Bowel/Bladder: Incontinent of bladder   Skin Concerns: WDL  Drains/Devices: PIV SL   Tests/Procedures for next shift: SW follow up  Anticipated DC date & active delays: 8/14? SW following   Patient Stated Goal for Today: Sleep          Observation goals  PRIOR TO DISCHARGE       Comments:   -diagnostic tests and consults completed and resulted- Partially met   -vital signs normal or at patient baseline- Met   -tolerating oral intake to maintain hydration- Met   -adequate pain control on oral analgesics- Met   -returns to baseline functional status- Not met   -safe disposition plan has been identified- Not met

## 2023-08-15 ENCOUNTER — PATIENT OUTREACH (OUTPATIENT)
Dept: CARE COORDINATION | Facility: CLINIC | Age: 83
End: 2023-08-15
Payer: COMMERCIAL

## 2023-08-15 NOTE — PROGRESS NOTES
[facility]    Background: Transitional Care Management program identified per system criteria and reviewed by [facility] team for possible outreach.    Assessment: Upon chart review, CCRC Team member will not proceed with patient outreach related to this episode of Transitional Care Management program due to reason below:    Non-[facility] TCU: CCRC team member noted patient discharged to TCU/ARU/LTACH. Patient is not established with a [facility] currently supported by Primary Care-Care Coordination therefore handoff to Primary Care-Care Coordination is not appropriate at this time.    Plan: Transitional Care Management episode addressed appropriately per reason noted above.      Analia Pelletier MA  Connected Care Resource [place], [facility]    *Connected Care Resource Team does NOT follow patient ongoing. Referrals are identified based on internal discharge reports and the outreach is to ensure patient has an understanding of their discharge instructions.

## 2023-08-16 ENCOUNTER — LAB REQUISITION (OUTPATIENT)
Dept: LAB | Facility: CLINIC | Age: 83
End: 2023-08-16
Payer: COMMERCIAL

## 2023-08-16 DIAGNOSIS — E55.9 VITAMIN D DEFICIENCY, UNSPECIFIED: ICD-10-CM

## 2023-08-17 ENCOUNTER — LAB REQUISITION (OUTPATIENT)
Dept: LAB | Facility: CLINIC | Age: 83
End: 2023-08-17
Payer: COMMERCIAL

## 2023-08-17 DIAGNOSIS — R53.1 WEAKNESS: ICD-10-CM

## 2023-08-17 LAB — DEPRECATED CALCIDIOL+CALCIFEROL SERPL-MC: 35 UG/L (ref 20–75)

## 2023-08-17 PROCEDURE — 36415 COLL VENOUS BLD VENIPUNCTURE: CPT | Mod: ORL | Performed by: REGISTERED NURSE

## 2023-08-17 PROCEDURE — 82306 VITAMIN D 25 HYDROXY: CPT | Mod: ORL | Performed by: REGISTERED NURSE

## 2023-08-17 PROCEDURE — P9604 ONE-WAY ALLOW PRORATED TRIP: HCPCS | Mod: ORL | Performed by: REGISTERED NURSE

## 2023-09-01 ENCOUNTER — LAB REQUISITION (OUTPATIENT)
Dept: LAB | Facility: CLINIC | Age: 83
End: 2023-09-01
Payer: COMMERCIAL

## 2023-09-01 DIAGNOSIS — Z51.81 ENCOUNTER FOR THERAPEUTIC DRUG LEVEL MONITORING: ICD-10-CM

## 2023-09-02 LAB
ANION GAP SERPL CALCULATED.3IONS-SCNC: 15 MMOL/L (ref 7–15)
BUN SERPL-MCNC: 40.1 MG/DL (ref 8–23)
CALCIUM SERPL-MCNC: 9.2 MG/DL (ref 8.8–10.2)
CHLORIDE SERPL-SCNC: 102 MMOL/L (ref 98–107)
CREAT SERPL-MCNC: 2.1 MG/DL (ref 0.51–0.95)
DEPRECATED HCO3 PLAS-SCNC: 22 MMOL/L (ref 22–29)
GFR SERPL CREATININE-BSD FRML MDRD: 23 ML/MIN/1.73M2
GLUCOSE SERPL-MCNC: 254 MG/DL (ref 70–99)
POTASSIUM SERPL-SCNC: 5 MMOL/L (ref 3.4–5.3)
SODIUM SERPL-SCNC: 139 MMOL/L (ref 136–145)

## 2023-09-02 PROCEDURE — 80048 BASIC METABOLIC PNL TOTAL CA: CPT | Mod: ORL | Performed by: REGISTERED NURSE

## 2023-09-06 ENCOUNTER — LAB REQUISITION (OUTPATIENT)
Dept: LAB | Facility: CLINIC | Age: 83
End: 2023-09-06
Payer: COMMERCIAL

## 2023-09-06 DIAGNOSIS — I10 ESSENTIAL (PRIMARY) HYPERTENSION: ICD-10-CM

## 2023-09-07 LAB
ANION GAP SERPL CALCULATED.3IONS-SCNC: 12 MMOL/L (ref 7–15)
BUN SERPL-MCNC: 46 MG/DL (ref 8–23)
CALCIUM SERPL-MCNC: 8.9 MG/DL (ref 8.8–10.2)
CHLORIDE SERPL-SCNC: 104 MMOL/L (ref 98–107)
CREAT SERPL-MCNC: 2.08 MG/DL (ref 0.51–0.95)
DEPRECATED HCO3 PLAS-SCNC: 23 MMOL/L (ref 22–29)
EGFRCR SERPLBLD CKD-EPI 2021: 23 ML/MIN/1.73M2
GLUCOSE SERPL-MCNC: 184 MG/DL (ref 70–99)
POTASSIUM SERPL-SCNC: 4.7 MMOL/L (ref 3.4–5.3)
SODIUM SERPL-SCNC: 139 MMOL/L (ref 136–145)

## 2023-09-07 PROCEDURE — P9604 ONE-WAY ALLOW PRORATED TRIP: HCPCS | Mod: ORL | Performed by: REGISTERED NURSE

## 2023-09-07 PROCEDURE — 36415 COLL VENOUS BLD VENIPUNCTURE: CPT | Mod: ORL | Performed by: REGISTERED NURSE

## 2023-09-07 PROCEDURE — 80048 BASIC METABOLIC PNL TOTAL CA: CPT | Mod: ORL | Performed by: REGISTERED NURSE

## 2023-09-11 ENCOUNTER — OFFICE VISIT (OUTPATIENT)
Dept: NEUROSURGERY | Facility: CLINIC | Age: 83
End: 2023-09-11
Attending: PHYSICIAN ASSISTANT
Payer: COMMERCIAL

## 2023-09-11 ENCOUNTER — TELEPHONE (OUTPATIENT)
Dept: NEUROSURGERY | Facility: CLINIC | Age: 83
End: 2023-09-11

## 2023-09-11 ENCOUNTER — ANCILLARY PROCEDURE (OUTPATIENT)
Dept: GENERAL RADIOLOGY | Facility: CLINIC | Age: 83
End: 2023-09-11
Attending: PHYSICIAN ASSISTANT
Payer: COMMERCIAL

## 2023-09-11 VITALS
HEIGHT: 62 IN | BODY MASS INDEX: 16.25 KG/M2 | WEIGHT: 88.3 LBS | DIASTOLIC BLOOD PRESSURE: 70 MMHG | HEART RATE: 74 BPM | OXYGEN SATURATION: 99 % | SYSTOLIC BLOOD PRESSURE: 166 MMHG

## 2023-09-11 DIAGNOSIS — S32.020A CLOSED COMPRESSION FRACTURE OF L2 LUMBAR VERTEBRA, INITIAL ENCOUNTER (H): ICD-10-CM

## 2023-09-11 DIAGNOSIS — S32.020G CLOSED WEDGE COMPRESSION FRACTURE OF L2 VERTEBRA WITH DELAYED HEALING, SUBSEQUENT ENCOUNTER: Primary | ICD-10-CM

## 2023-09-11 DIAGNOSIS — S32.040A COMPRESSION FRACTURE OF L4 LUMBAR VERTEBRA, CLOSED, INITIAL ENCOUNTER (H): ICD-10-CM

## 2023-09-11 DIAGNOSIS — S32.050A COMPRESSION FRACTURE OF L5 LUMBAR VERTEBRA, CLOSED, INITIAL ENCOUNTER (H): ICD-10-CM

## 2023-09-11 PROCEDURE — 72100 X-RAY EXAM L-S SPINE 2/3 VWS: CPT | Mod: TC | Performed by: RADIOLOGY

## 2023-09-11 PROCEDURE — 99213 OFFICE O/P EST LOW 20 MIN: CPT | Performed by: PHYSICIAN ASSISTANT

## 2023-09-11 PROCEDURE — G0463 HOSPITAL OUTPT CLINIC VISIT: HCPCS | Performed by: PHYSICIAN ASSISTANT

## 2023-09-11 ASSESSMENT — PAIN SCALES - GENERAL: PAINLEVEL: SEVERE PAIN (6)

## 2023-09-11 NOTE — LETTER
"    9/11/2023         RE: June MYLES Do  [address]  [facility] [address]        Dear Colleague,    Thank you for referring your patient, uJne MYLES Do, to the [facility] [place]. Please see a copy of my visit note below.    NEUROSURGERY CLINIC PROGRESS NOTE    DATE OF VISIT: 9/11/2023    HPI:     June MYLES Do is a pleasant 82 year old female who presents to the clinic today for a 6-week follow up L2, L4 ,L5 fractures.    Since discharge, patient admits pain is slight better but still ongoing since hospital. Pain aggravated with  movement/ambulating and alleviated with resting. Denies radicular sx, paresthesias, weakness, gait changes. Has NOT been in a brace since discharge.     Daughter admits patient takes daily vitamin D; no other meds for osteoporosis.     Entire visit with  on I Pad  Current Outpatient Medications   Medication     acetaminophen (TYLENOL) 325 MG tablet     aspirin (ASA) 81 MG chewable tablet     brimonidine-timolol (COMBIGAN) 0.2-0.5 % ophthalmic solution     darbepoetin claudette (ARANESP, ALBUMIN FREE,) 60 MCG/0.3ML injection     docusate sodium (COLACE) 100 MG capsule     empagliflozin (JARDIANCE) 10 MG TABS tablet     Ferrous Gluconate 324 (37.5 Fe) MG TABS     insulin glargine (LANTUS PEN) 100 UNIT/ML pen     insulin lispro (HUMALOG KWIKPEN) 100 UNIT/ML (1 unit dial) KWIKPEN     levocetirizine (XYZAL) 5 MG tablet     oxyCODONE (ROXICODONE) 5 MG tablet     pravastatin (PRAVACHOL) 20 MG tablet     senna-docusate (SENOKOT-S/PERICOLACE) 8.6-50 MG tablet     vitamin D3 (CHOLECALCIFEROL) 50 mcg (2000 units) tablet     No current facility-administered medications for this visit.       No Known Allergies    Past Medical History:   Diagnosis Date     Diabetes (H)        Review Of Systems     ROS: 10 point ROS neg other than the symptoms noted above in the HPI.      OBJECTIVE:    BP (!) 166/70   Pulse 74   Ht 5' 2\" (1.575 m)   Wt 88 lb 4.8 oz " (40.1 kg)   SpO2 99%   BMI 16.15 kg/m      Imaging:    Narrative & Impression   LUMBAR SPINE TWO TO THREE VIEWS  9/11/2023 8:21 AM      HISTORY: Closed compression fracture of L2 lumbar vertebra, initial  encounter (H). Compression fracture of L4 lumbar vertebra, closed,  initial encounter (H). Compression fracture of L5 lumbar vertebra,  closed, initial encounter (H).     COMPARISON: MRI lumbar spine dated 8/19/2023. CT abdomen and pelvis  8/9/2023.                                                                      IMPRESSION: Nomenclature is based on five lumbar vertebral bodies.  There is diffuse osseous demineralization that limits evaluation for  fracture. Interval progression of now moderate/moderate to severe  vertebral body height loss involving the previously seen L2  compression fracture. No significant change in mild L4 and L5  compression deformities compared to prior exam. No new vertebral body  height loss is identified. Minimal sigmoid curvature of the lumbar  spine. Mild focal kyphosis centered at L2 related to the L2  compression fracture deformity. Alignment otherwise appears normal.  Mild disc space narrowing L1-L2, L2-L3 and L4-L5. Marginal endplate  osteophytes. Mild/moderate multilevel degenerative facet disease.  Scattered atherosclerotic vascular calcifications.      Radiographic Findings: Full radiological report in chart. I personally reviewed the images with the patient today.    Exam:    Patient appears comfortable and in no apparent distress. Moving all extremities.  Gait is non-antalgic.  CN II-XII grossly intact, alert and appropriate with conversation and following  commands  Bilateral lower extremities 5/5 strength including plantar and dorsiflexion.  Normal sensation throughout bilaterally.  +TTP along lumbar spinous processes and paraspinous muscles     ASSESSMENT:    1. Closed wedge compression fracture of L2 vertebra with delayed healing, subsequent encounter         PLAN:    June MYLES Do is a pleasant 82 year old female who presents to the clinic today for a 6-week follow up L2, L4 ,L5 fractures.    Since discharge, patient admits pain is slight better but still ongoing since hospital. Pain aggravated with  movement/ambulating and alleviated with resting. Denies radicular sx, paresthesias, weakness, gait changes. Has NOT been in a brace since discharge.     Daughter admits patient takes daily vitamin D; no other meds for osteoporosis.     Imaging was reviewed today and all questions were answered.    Due to ongoing pain, will recommend TLSO for worsening L2 fracture to be worn when out of bed ambulating and may be OFF for sitting and resting. Will reach out to PCP Dr. Clark for osteoporosis management. Would be at risk of further levels fracturing if consider vertebroplasty due to osteoporosis. For now, continue conservative management with brace and light activity.     Ms. Shultz will return to the clinic in 6 weeks with repeat imaging.    The patient gave verbal understanding and is in agreement with the above plan. She will call or return to the clinic for any worsening or changes in symptoms.    Respectfully,     Corinne Raygoza PA-C  [facility] Neurosurgery  [address] [address]    Tel [phone]  Pager [phone]      Again, thank you for allowing me to participate in the care of your patient.        Sincerely,        Corinne Raygoza PA-C

## 2023-09-11 NOTE — LETTER
September 11, 2023      June MYLES Do  [address]   [facility] [address]        To Whom It May Concern:    June MYLES Do  was seen on 9/11/23.  Please excuse Gloria Calloway until 9/11/23 due to taking June Shultz to doctor appointment.        Sincerely,        Corinne Raygoza PA-C

## 2023-09-11 NOTE — PROGRESS NOTES
"NEUROSURGERY CLINIC PROGRESS NOTE    DATE OF VISIT: 9/11/2023    HPI:     June MYLES Do is a pleasant 82 year old female who presents to the clinic today for a 6-week follow up L2, L4 ,L5 fractures.    Since discharge, patient admits pain is slight better but still ongoing since hospital. Pain aggravated with  movement/ambulating and alleviated with resting. Denies radicular sx, paresthesias, weakness, gait changes. Has NOT been in a brace since discharge.     Daughter admits patient takes daily vitamin D; no other meds for osteoporosis.     Entire visit with  on I Pad  Current Outpatient Medications   Medication    acetaminophen (TYLENOL) 325 MG tablet    aspirin (ASA) 81 MG chewable tablet    brimonidine-timolol (COMBIGAN) 0.2-0.5 % ophthalmic solution    darbepoetin claudtete (ARANESP, ALBUMIN FREE,) 60 MCG/0.3ML injection    docusate sodium (COLACE) 100 MG capsule    empagliflozin (JARDIANCE) 10 MG TABS tablet    Ferrous Gluconate 324 (37.5 Fe) MG TABS    insulin glargine (LANTUS PEN) 100 UNIT/ML pen    insulin lispro (HUMALOG KWIKPEN) 100 UNIT/ML (1 unit dial) KWIKPEN    levocetirizine (XYZAL) 5 MG tablet    oxyCODONE (ROXICODONE) 5 MG tablet    pravastatin (PRAVACHOL) 20 MG tablet    senna-docusate (SENOKOT-S/PERICOLACE) 8.6-50 MG tablet    vitamin D3 (CHOLECALCIFEROL) 50 mcg (2000 units) tablet     No current facility-administered medications for this visit.       No Known Allergies    Past Medical History:   Diagnosis Date    Diabetes (H)        Review Of Systems     ROS: 10 point ROS neg other than the symptoms noted above in the HPI.      OBJECTIVE:    BP (!) 166/70   Pulse 74   Ht 5' 2\" (1.575 m)   Wt 88 lb 4.8 oz (40.1 kg)   SpO2 99%   BMI 16.15 kg/m      Imaging:    Narrative & Impression   LUMBAR SPINE TWO TO THREE VIEWS  9/11/2023 8:21 AM      HISTORY: Closed compression fracture of L2 lumbar vertebra, initial  encounter (H). Compression fracture of L4 lumbar vertebra, closed,  initial " encounter (H). Compression fracture of L5 lumbar vertebra,  closed, initial encounter (H).     COMPARISON: MRI lumbar spine dated 8/19/2023. CT abdomen and pelvis  8/9/2023.                                                                      IMPRESSION: Nomenclature is based on five lumbar vertebral bodies.  There is diffuse osseous demineralization that limits evaluation for  fracture. Interval progression of now moderate/moderate to severe  vertebral body height loss involving the previously seen L2  compression fracture. No significant change in mild L4 and L5  compression deformities compared to prior exam. No new vertebral body  height loss is identified. Minimal sigmoid curvature of the lumbar  spine. Mild focal kyphosis centered at L2 related to the L2  compression fracture deformity. Alignment otherwise appears normal.  Mild disc space narrowing L1-L2, L2-L3 and L4-L5. Marginal endplate  osteophytes. Mild/moderate multilevel degenerative facet disease.  Scattered atherosclerotic vascular calcifications.      Radiographic Findings: Full radiological report in chart. I personally reviewed the images with the patient today.    Exam:    Patient appears comfortable and in no apparent distress. Moving all extremities.  Gait is non-antalgic.  CN II-XII grossly intact, alert and appropriate with conversation and following  commands  Bilateral lower extremities 5/5 strength including plantar and dorsiflexion.  Normal sensation throughout bilaterally.  +TTP along lumbar spinous processes and paraspinous muscles     ASSESSMENT:    1. Closed wedge compression fracture of L2 vertebra with delayed healing, subsequent encounter        PLAN:    June MYLES Do is a pleasant 82 year old female who presents to the clinic today for a 6-week follow up L2, L4 ,L5 fractures.    Since discharge, patient admits pain is slight better but still ongoing since hospital. Pain aggravated with  movement/ambulating and alleviated with resting.  Denies radicular sx, paresthesias, weakness, gait changes. Has NOT been in a brace since discharge.     Daughter admits patient takes daily vitamin D; no other meds for osteoporosis.     Imaging was reviewed today and all questions were answered.    Due to ongoing pain, will recommend TLSO for worsening L2 fracture to be worn when out of bed ambulating and may be OFF for sitting and resting. Will reach out to PCP Dr. Clark for osteoporosis management. Would be at risk of further levels fracturing if consider vertebroplasty due to osteoporosis. For now, continue conservative management with brace and light activity.     Ms. Shultz will return to the clinic in 6 weeks with repeat imaging.    The patient gave verbal understanding and is in agreement with the above plan. She will call or return to the clinic for any worsening or changes in symptoms.    Respectfully,     Corinne TEJEDA [facility] Neurosurgery  [address] [address]    Tel [phone]  Pager [phone]

## 2023-09-11 NOTE — TELEPHONE ENCOUNTER
Patient seen for hospital follow up regarding p L2, L4, L5 fractures by Corinne Raygoza     Asked by provider to contact PCP Dr Cordelia Clark to ensure patient is up to date on osteoporosis management     Called [phone]    Staff will leave an FYI for MD

## 2023-09-11 NOTE — PATIENT INSTRUCTIONS
-brace to be worn when up and walking and OFF with sitting/resting/sleeping  -continue light activity- Please limit your lifting to no more that ten pounds and avoid excessive bending, twisting and turning at the lumbar spine. You should also avoid excessive jostling and jarring activities.   -will reach out to PCP Dr. Clark about osteoporosis management    -follow up in 6 weeks with XR prior, our office to schedule    Corinne Raygoza PA-C  [facility] Neurosurgery  [address] [address]    Tel [phone]  Pager [phone]

## 2023-09-11 NOTE — NURSING NOTE
"June MYLES Do is a 82 year old female who presents for:  Chief Complaint   Patient presents with    RECHECK     Compression L5 Fracture        Initial Vitals:  BP (!) 166/70   Pulse 74   Ht 5' 2\" (1.575 m)   Wt 88 lb 4.8 oz (40.1 kg)   SpO2 99%   BMI 16.15 kg/m   Estimated body mass index is 16.15 kg/m  as calculated from the following:    Height as of this encounter: 5' 2\" (1.575 m).    Weight as of this encounter: 88 lb 4.8 oz (40.1 kg).. Body surface area is 1.32 meters squared. BP completed using cuff size: pediatric  Severe Pain (6)        Lupe Hall   "

## 2023-09-13 ENCOUNTER — TELEPHONE (OUTPATIENT)
Dept: NEUROSURGERY | Facility: CLINIC | Age: 83
End: 2023-09-13
Payer: COMMERCIAL

## 2023-09-13 NOTE — TELEPHONE ENCOUNTER
LVM for patient's daughter Gloria to call back to schedule 6 wk. Hospital follow up with an XR prior per staff message request.

## 2023-09-26 ENCOUNTER — APPOINTMENT (OUTPATIENT)
Dept: CT IMAGING | Facility: CLINIC | Age: 83
End: 2023-09-26
Attending: EMERGENCY MEDICINE
Payer: COMMERCIAL

## 2023-09-26 ENCOUNTER — HOSPITAL ENCOUNTER (OUTPATIENT)
Facility: CLINIC | Age: 83
Setting detail: OBSERVATION
Discharge: NURSING, REHAB OR LONG-TERM CARE | End: 2023-09-29
Attending: EMERGENCY MEDICINE | Admitting: EMERGENCY MEDICINE
Payer: COMMERCIAL

## 2023-09-26 DIAGNOSIS — S32.020A CLOSED COMPRESSION FRACTURE OF L2 LUMBAR VERTEBRA, INITIAL ENCOUNTER (H): Primary | ICD-10-CM

## 2023-09-26 DIAGNOSIS — S32.020G: ICD-10-CM

## 2023-09-26 DIAGNOSIS — Z79.4 TYPE 2 DIABETES MELLITUS WITH OTHER SPECIFIED COMPLICATION, WITH LONG-TERM CURRENT USE OF INSULIN (H): ICD-10-CM

## 2023-09-26 DIAGNOSIS — S32.050A COMPRESSION FRACTURE OF L5 LUMBAR VERTEBRA, CLOSED, INITIAL ENCOUNTER (H): ICD-10-CM

## 2023-09-26 DIAGNOSIS — E11.69 TYPE 2 DIABETES MELLITUS WITH OTHER SPECIFIED COMPLICATION, WITH LONG-TERM CURRENT USE OF INSULIN (H): ICD-10-CM

## 2023-09-26 LAB
ANION GAP SERPL CALCULATED.3IONS-SCNC: 12 MMOL/L (ref 7–15)
BASOPHILS # BLD AUTO: 0 10E3/UL (ref 0–0.2)
BASOPHILS NFR BLD AUTO: 0 %
BUN SERPL-MCNC: 31.5 MG/DL (ref 8–23)
CALCIUM SERPL-MCNC: 9.1 MG/DL (ref 8.8–10.2)
CHLORIDE SERPL-SCNC: 100 MMOL/L (ref 98–107)
CREAT SERPL-MCNC: 1.5 MG/DL (ref 0.51–0.95)
DEPRECATED HCO3 PLAS-SCNC: 25 MMOL/L (ref 22–29)
EGFRCR SERPLBLD CKD-EPI 2021: 35 ML/MIN/1.73M2
EOSINOPHIL # BLD AUTO: 0.1 10E3/UL (ref 0–0.7)
EOSINOPHIL NFR BLD AUTO: 1 %
ERYTHROCYTE [DISTWIDTH] IN BLOOD BY AUTOMATED COUNT: 16.7 % (ref 10–15)
GLUCOSE BLDC GLUCOMTR-MCNC: 154 MG/DL (ref 70–99)
GLUCOSE BLDC GLUCOMTR-MCNC: 161 MG/DL (ref 70–99)
GLUCOSE SERPL-MCNC: 149 MG/DL (ref 70–99)
HCT VFR BLD AUTO: 31 % (ref 35–47)
HGB BLD-MCNC: 9.4 G/DL (ref 11.7–15.7)
HOLD SPECIMEN: NORMAL
HOLD SPECIMEN: NORMAL
IMM GRANULOCYTES # BLD: 0.1 10E3/UL
IMM GRANULOCYTES NFR BLD: 1 %
LYMPHOCYTES # BLD AUTO: 1.3 10E3/UL (ref 0.8–5.3)
LYMPHOCYTES NFR BLD AUTO: 15 %
MCH RBC QN AUTO: 19.7 PG (ref 26.5–33)
MCHC RBC AUTO-ENTMCNC: 30.3 G/DL (ref 31.5–36.5)
MCV RBC AUTO: 65 FL (ref 78–100)
MONOCYTES # BLD AUTO: 0.7 10E3/UL (ref 0–1.3)
MONOCYTES NFR BLD AUTO: 7 %
NEUTROPHILS # BLD AUTO: 6.9 10E3/UL (ref 1.6–8.3)
NEUTROPHILS NFR BLD AUTO: 76 %
NRBC # BLD AUTO: 0 10E3/UL
NRBC BLD AUTO-RTO: 0 /100
PLATELET # BLD AUTO: 242 10E3/UL (ref 150–450)
POTASSIUM SERPL-SCNC: 3.6 MMOL/L (ref 3.4–5.3)
RBC # BLD AUTO: 4.76 10E6/UL (ref 3.8–5.2)
SODIUM SERPL-SCNC: 137 MMOL/L (ref 135–145)
WBC # BLD AUTO: 9.1 10E3/UL (ref 4–11)

## 2023-09-26 PROCEDURE — 72131 CT LUMBAR SPINE W/O DYE: CPT

## 2023-09-26 PROCEDURE — 72192 CT PELVIS W/O DYE: CPT

## 2023-09-26 PROCEDURE — 82962 GLUCOSE BLOOD TEST: CPT

## 2023-09-26 PROCEDURE — 99285 EMERGENCY DEPT VISIT HI MDM: CPT | Mod: 25

## 2023-09-26 PROCEDURE — 85025 COMPLETE CBC W/AUTO DIFF WBC: CPT | Performed by: EMERGENCY MEDICINE

## 2023-09-26 PROCEDURE — G0378 HOSPITAL OBSERVATION PER HR: HCPCS

## 2023-09-26 PROCEDURE — 80048 BASIC METABOLIC PNL TOTAL CA: CPT | Performed by: EMERGENCY MEDICINE

## 2023-09-26 PROCEDURE — 36415 COLL VENOUS BLD VENIPUNCTURE: CPT | Performed by: EMERGENCY MEDICINE

## 2023-09-26 ASSESSMENT — ACTIVITIES OF DAILY LIVING (ADL)
ADLS_ACUITY_SCORE: 35

## 2023-09-26 NOTE — ED PROVIDER NOTES
History     Chief Complaint:  Back Pain       The history is provided by the EMS personnel.      June MYLES Do is a 82 year old female presents emergency department via EMS for lower back pain.  Reportedly she had previously injured her back and then recently felt sudden pain when bending over to pick something up.  EMS was called today she was given 100 mcg of fentanyl in route.  Currently she says the pain feels better, speaks Citizen of the [place] predominantly but does speak and seem to understand some English.      Independent Historian:   EMS - They report as above    Review of External Notes:   Reviewed hospital discharge instructions from August      Medications:    Aranesp   Jardiance  Lantus pen  Humalog  Pravastatin  Fergon  Cozaar      Past Medical History:    Diabetes  Pulmonary nodule  Compression fracture L4, L5, L2  Microcytic hypochromic anemia  CKD  Glaucoma  Osteoarthritis  Hyperlipidemia  Hypertension       Physical Exam   Patient Vitals for the past 24 hrs:   BP Temp Temp src Pulse Resp SpO2   09/26/23 1912 (!) 183/78 -- -- 82 -- 95 %   09/26/23 1903 -- -- -- -- -- 94 %   09/26/23 1845 (!) 186/86 -- -- 80 -- 98 %   09/26/23 1745 (!) 176/83 -- -- 84 -- 99 %   09/26/23 1729 (!) 182/88 -- -- 87 -- 98 %   09/26/23 1717 (!) 216/96 -- -- 90 -- 98 %   09/26/23 1712 (!) 222/101 -- -- 95 -- 98 %   09/26/23 1707 (!) 222/98 99  F (37.2  C) Oral 91 14 100 %        Physical Exam  Gen: well appearing, in no acute distress  Oral : Mucous membranes moist,   Nose: No rhinorrhea  Ears: External near normal, without drainage  Eyes: periorbital tissues and sclera normal   Neck: supple, no abnormal swelling  Lungs: Clear bilaterally, no tachypnea or distress, speaks full sentences  CV: Regular rate, regular rhythm  Abd: soft, nontender, nondistended, no rebound/guarding  Ext: no lower extremity edema  Skin: warm, dry, well perfused, no rashes/bruising/lesions on exposed skin  Neuro: alert, no gross motor or sensory deficits,    Psych: pleasant mood, normal affect      Emergency Department Course   ECG    Imaging:  CT Pelvis Bone wo Contrast   Final Result   IMPRESSION:   1.  No pelvic or proximal femur fracture.   2.  Depression of the L5 superior endplate, similar since the 08/09/2023 MRI.   3.  No acute soft tissue or myotendinous pathology is evident.      Lumbar spine CT w/o contrast   Final Result   IMPRESSION:      1.  Progressive height loss associated with known recent compression fracture of the L2 vertebral body, now with approximately 50% height loss centrally. There is trace bony retropulsion involving the L2 inferior endplate without substantial spinal canal    compromise.   2.  Similar mild compressive deformities of the L4 inferior endplate and L5 superior endplate.   3.  No evidence of new acute fracture involving the lumbar spine.   4.  No high-grade spinal canal or neural foraminal stenosis. Neural foraminal stenosis is greatest and moderate bilaterally at L4-L5 as seen on recent MRI.         Report per radiology    Laboratory:  Labs Ordered and Resulted from Time of ED Arrival to Time of ED Departure   BASIC METABOLIC PANEL - Abnormal       Result Value    Sodium 137      Potassium 3.6      Chloride 100      Carbon Dioxide (CO2) 25      Anion Gap 12      Urea Nitrogen 31.5 (*)     Creatinine 1.50 (*)     GFR Estimate 35 (*)     Calcium 9.1      Glucose 149 (*)    GLUCOSE BY METER - Abnormal    GLUCOSE BY METER POCT 161 (*)    CBC WITH PLATELETS AND DIFFERENTIAL - Abnormal    WBC Count 9.1      RBC Count 4.76      Hemoglobin 9.4 (*)     Hematocrit 31.0 (*)     MCV 65 (*)     MCH 19.7 (*)     MCHC 30.3 (*)     RDW 16.7 (*)     Platelet Count 242      % Neutrophils 76      % Lymphocytes 15      % Monocytes 7      % Eosinophils 1      % Basophils 0      % Immature Granulocytes 1      NRBCs per 100 WBC 0      Absolute Neutrophils 6.9      Absolute Lymphocytes 1.3      Absolute Monocytes 0.7      Absolute Eosinophils 0.1       Absolute Basophils 0.0      Absolute Immature Granulocytes 0.1      Absolute NRBCs 0.0          Procedures       Emergency Department Course & Assessments:             Interventions:  Medications - No data to display     Assessments:      Independent Interpretation (X-rays, CTs, rhythm strip):  None    Consultations/Discussion of Management or Tests:  Neurosurgery consult       Social Determinants of Health affecting care:   None    Disposition:  The patient was discharged to home.     Impression & Plan    CMS Diagnoses:   and None      Medical Decision Making:  Patient presents emergency department worsening back pain.  EMS report was that she felt sudden pain when she was bending over to pick something up.  She is not wearing her TSL O brace currently, in discussion with family after they arrived she typically takes it off at home and thinks she can do too much.  He is having worsening pain today, CT shows worsening L2 fracture.  Some retropulsion, no neurologic compromise that I appreciate at this time.  Patient is having fair amount of pain again today, contacted neurosurgical PA on-call who is in agreement with observation for pain control and they will follow-up with the patient tomorrow.  There is no emergent surgical indication at this time.  Contacted hospitalist is in agreement.      Diagnosis:    ICD-10-CM    1. Compression fracture of L2, with delayed healing, subsequent encounter  S32.020G            Discharge Medications:  New Prescriptions    No medications on file          Roberto Rees MD  9/26/2023   Roberto Rees,*        Roberto Rees MD  09/26/23 1951

## 2023-09-26 NOTE — ED TRIAGE NOTES
Pt presents via EMS for back pain. EMS reports recent back injury one month ago. Three days ago patient bent over to lift something and re-injured her back. HX: DM. BS for EMS: 67, 125ml D10 given. EMS rechecked BS: 197. Total 100mcg Fentanyl given by EMS. Upon arrival patient confused, right sided gaze. MD called to bedside for stroke eval. No code stroke called. BP: 222/98. Pt alert to self and situation.

## 2023-09-26 NOTE — LETTER
09/26/23      To Whom it may concern:    Gloria was in our Emergency Department today, 09/26/23. with a patient who needed their assistance.  Please excuse them from work on this day.      Sincerely,    Radha CORRALES RN

## 2023-09-26 NOTE — ED NOTES
Bed: ED16  Expected date: 9/26/23  Expected time: 4:39 PM  Means of arrival:   Comments:  Eloisa: Gely [phone] Female

## 2023-09-27 ENCOUNTER — APPOINTMENT (OUTPATIENT)
Dept: PHYSICAL THERAPY | Facility: CLINIC | Age: 83
End: 2023-09-27
Attending: STUDENT IN AN ORGANIZED HEALTH CARE EDUCATION/TRAINING PROGRAM
Payer: COMMERCIAL

## 2023-09-27 LAB
ANION GAP SERPL CALCULATED.3IONS-SCNC: 14 MMOL/L (ref 7–15)
BUN SERPL-MCNC: 34.1 MG/DL (ref 8–23)
CALCIUM SERPL-MCNC: 8.9 MG/DL (ref 8.8–10.2)
CHLORIDE SERPL-SCNC: 103 MMOL/L (ref 98–107)
CREAT SERPL-MCNC: 1.65 MG/DL (ref 0.51–0.95)
DEPRECATED HCO3 PLAS-SCNC: 22 MMOL/L (ref 22–29)
EGFRCR SERPLBLD CKD-EPI 2021: 31 ML/MIN/1.73M2
ERYTHROCYTE [DISTWIDTH] IN BLOOD BY AUTOMATED COUNT: 17 % (ref 10–15)
GLUCOSE BLDC GLUCOMTR-MCNC: 163 MG/DL (ref 70–99)
GLUCOSE BLDC GLUCOMTR-MCNC: 289 MG/DL (ref 70–99)
GLUCOSE BLDC GLUCOMTR-MCNC: 306 MG/DL (ref 70–99)
GLUCOSE BLDC GLUCOMTR-MCNC: 352 MG/DL (ref 70–99)
GLUCOSE BLDC GLUCOMTR-MCNC: 385 MG/DL (ref 70–99)
GLUCOSE BLDC GLUCOMTR-MCNC: 424 MG/DL (ref 70–99)
GLUCOSE SERPL-MCNC: 172 MG/DL (ref 70–99)
HCT VFR BLD AUTO: 30.7 % (ref 35–47)
HGB BLD-MCNC: 9.2 G/DL (ref 11.7–15.7)
MCH RBC QN AUTO: 19.9 PG (ref 26.5–33)
MCHC RBC AUTO-ENTMCNC: 30 G/DL (ref 31.5–36.5)
MCV RBC AUTO: 66 FL (ref 78–100)
PLATELET # BLD AUTO: 216 10E3/UL (ref 150–450)
POTASSIUM SERPL-SCNC: 3.8 MMOL/L (ref 3.4–5.3)
RBC # BLD AUTO: 4.63 10E6/UL (ref 3.8–5.2)
SODIUM SERPL-SCNC: 139 MMOL/L (ref 135–145)
WBC # BLD AUTO: 6.5 10E3/UL (ref 4–11)

## 2023-09-27 PROCEDURE — 250N000013 HC RX MED GY IP 250 OP 250 PS 637: Performed by: STUDENT IN AN ORGANIZED HEALTH CARE EDUCATION/TRAINING PROGRAM

## 2023-09-27 PROCEDURE — G0378 HOSPITAL OBSERVATION PER HR: HCPCS

## 2023-09-27 PROCEDURE — 82962 GLUCOSE BLOOD TEST: CPT

## 2023-09-27 PROCEDURE — 97161 PT EVAL LOW COMPLEX 20 MIN: CPT | Mod: GP | Performed by: PHYSICAL THERAPIST

## 2023-09-27 PROCEDURE — 85027 COMPLETE CBC AUTOMATED: CPT | Performed by: STUDENT IN AN ORGANIZED HEALTH CARE EDUCATION/TRAINING PROGRAM

## 2023-09-27 PROCEDURE — 99222 1ST HOSP IP/OBS MODERATE 55: CPT | Performed by: STUDENT IN AN ORGANIZED HEALTH CARE EDUCATION/TRAINING PROGRAM

## 2023-09-27 PROCEDURE — 80048 BASIC METABOLIC PNL TOTAL CA: CPT | Performed by: STUDENT IN AN ORGANIZED HEALTH CARE EDUCATION/TRAINING PROGRAM

## 2023-09-27 PROCEDURE — 250N000012 HC RX MED GY IP 250 OP 636 PS 637: Performed by: STUDENT IN AN ORGANIZED HEALTH CARE EDUCATION/TRAINING PROGRAM

## 2023-09-27 PROCEDURE — 97116 GAIT TRAINING THERAPY: CPT | Mod: GP | Performed by: PHYSICAL THERAPIST

## 2023-09-27 PROCEDURE — 97530 THERAPEUTIC ACTIVITIES: CPT | Mod: GP | Performed by: PHYSICAL THERAPIST

## 2023-09-27 PROCEDURE — 250N000009 HC RX 250: Performed by: STUDENT IN AN ORGANIZED HEALTH CARE EDUCATION/TRAINING PROGRAM

## 2023-09-27 PROCEDURE — 36415 COLL VENOUS BLD VENIPUNCTURE: CPT | Performed by: STUDENT IN AN ORGANIZED HEALTH CARE EDUCATION/TRAINING PROGRAM

## 2023-09-27 RX ORDER — PRAVASTATIN SODIUM 20 MG
20 TABLET ORAL AT BEDTIME
Status: DISCONTINUED | OUTPATIENT
Start: 2023-09-27 | End: 2023-09-29 | Stop reason: HOSPADM

## 2023-09-27 RX ORDER — PROCHLORPERAZINE MALEATE 5 MG
5 TABLET ORAL EVERY 6 HOURS PRN
Status: DISCONTINUED | OUTPATIENT
Start: 2023-09-27 | End: 2023-09-29 | Stop reason: HOSPADM

## 2023-09-27 RX ORDER — AMOXICILLIN 250 MG
1 CAPSULE ORAL DAILY
Status: DISCONTINUED | OUTPATIENT
Start: 2023-09-27 | End: 2023-09-29 | Stop reason: HOSPADM

## 2023-09-27 RX ORDER — BISACODYL 10 MG
10 SUPPOSITORY, RECTAL RECTAL DAILY PRN
Status: DISCONTINUED | OUTPATIENT
Start: 2023-09-27 | End: 2023-09-29 | Stop reason: HOSPADM

## 2023-09-27 RX ORDER — DOCUSATE SODIUM 100 MG/1
100 CAPSULE, LIQUID FILLED ORAL DAILY
Status: DISCONTINUED | OUTPATIENT
Start: 2023-09-27 | End: 2023-09-29 | Stop reason: HOSPADM

## 2023-09-27 RX ORDER — DEXTROSE MONOHYDRATE 25 G/50ML
25-50 INJECTION, SOLUTION INTRAVENOUS
Status: DISCONTINUED | OUTPATIENT
Start: 2023-09-27 | End: 2023-09-29 | Stop reason: HOSPADM

## 2023-09-27 RX ORDER — NICOTINE POLACRILEX 4 MG
15-30 LOZENGE BUCCAL
Status: DISCONTINUED | OUTPATIENT
Start: 2023-09-27 | End: 2023-09-29 | Stop reason: HOSPADM

## 2023-09-27 RX ORDER — VITAMIN B COMPLEX
50 TABLET ORAL DAILY
Status: DISCONTINUED | OUTPATIENT
Start: 2023-09-27 | End: 2023-09-29 | Stop reason: HOSPADM

## 2023-09-27 RX ORDER — BRIMONIDINE TARTRATE AND TIMOLOL MALEATE 2; 5 MG/ML; MG/ML
1 SOLUTION OPHTHALMIC 2 TIMES DAILY
Status: DISCONTINUED | OUTPATIENT
Start: 2023-09-27 | End: 2023-09-29 | Stop reason: HOSPADM

## 2023-09-27 RX ORDER — ACETAMINOPHEN 325 MG/1
975 TABLET ORAL EVERY 8 HOURS
Status: DISCONTINUED | OUTPATIENT
Start: 2023-09-27 | End: 2023-09-27

## 2023-09-27 RX ORDER — ACETAMINOPHEN 325 MG/1
975 TABLET ORAL EVERY 8 HOURS
Status: DISCONTINUED | OUTPATIENT
Start: 2023-09-27 | End: 2023-09-29 | Stop reason: HOSPADM

## 2023-09-27 RX ORDER — POLYETHYLENE GLYCOL 3350 17 G/17G
17 POWDER, FOR SOLUTION ORAL DAILY
Status: DISCONTINUED | OUTPATIENT
Start: 2023-09-27 | End: 2023-09-29 | Stop reason: HOSPADM

## 2023-09-27 RX ORDER — PROCHLORPERAZINE 25 MG
12.5 SUPPOSITORY, RECTAL RECTAL EVERY 12 HOURS PRN
Status: DISCONTINUED | OUTPATIENT
Start: 2023-09-27 | End: 2023-09-29 | Stop reason: HOSPADM

## 2023-09-27 RX ORDER — ONDANSETRON 2 MG/ML
4 INJECTION INTRAMUSCULAR; INTRAVENOUS EVERY 6 HOURS PRN
Status: DISCONTINUED | OUTPATIENT
Start: 2023-09-27 | End: 2023-09-29 | Stop reason: HOSPADM

## 2023-09-27 RX ORDER — FERROUS GLUCONATE 324(38)MG
324 TABLET ORAL DAILY
Status: DISCONTINUED | OUTPATIENT
Start: 2023-09-27 | End: 2023-09-29 | Stop reason: HOSPADM

## 2023-09-27 RX ORDER — CALCITONIN SALMON 200 [IU]/.09ML
1 SPRAY, METERED NASAL DAILY
Status: DISCONTINUED | OUTPATIENT
Start: 2023-09-27 | End: 2023-09-29 | Stop reason: HOSPADM

## 2023-09-27 RX ORDER — ONDANSETRON 4 MG/1
4 TABLET, ORALLY DISINTEGRATING ORAL EVERY 6 HOURS PRN
Status: DISCONTINUED | OUTPATIENT
Start: 2023-09-27 | End: 2023-09-29 | Stop reason: HOSPADM

## 2023-09-27 RX ADMIN — BRIMONIDINE TARTRATE AND TIMOLOL MALEATE 1 DROP: 2; 5 SOLUTION/ DROPS OPHTHALMIC at 20:17

## 2023-09-27 RX ADMIN — POLYETHYLENE GLYCOL 3350 17 G: 17 POWDER, FOR SOLUTION ORAL at 17:01

## 2023-09-27 RX ADMIN — DOCUSATE SODIUM 100 MG: 100 CAPSULE, LIQUID FILLED ORAL at 17:01

## 2023-09-27 RX ADMIN — FERROUS GLUCONATE 324 MG: 324 TABLET ORAL at 17:02

## 2023-09-27 RX ADMIN — ACETAMINOPHEN 975 MG: 325 TABLET, FILM COATED ORAL at 00:56

## 2023-09-27 RX ADMIN — HYDROMORPHONE HYDROCHLORIDE 1 MG: 2 TABLET ORAL at 22:02

## 2023-09-27 RX ADMIN — SENNOSIDES AND DOCUSATE SODIUM 1 TABLET: 8.6; 5 TABLET ORAL at 17:02

## 2023-09-27 RX ADMIN — INSULIN ASPART 3 UNITS: 100 INJECTION, SOLUTION INTRAVENOUS; SUBCUTANEOUS at 09:57

## 2023-09-27 RX ADMIN — HYDROMORPHONE HYDROCHLORIDE 1 MG: 2 TABLET ORAL at 04:47

## 2023-09-27 RX ADMIN — POLYETHYLENE GLYCOL 3350 17 G: 17 POWDER, FOR SOLUTION ORAL at 09:54

## 2023-09-27 RX ADMIN — INSULIN ASPART 2 UNITS: 100 INJECTION, SOLUTION INTRAVENOUS; SUBCUTANEOUS at 13:07

## 2023-09-27 RX ADMIN — CALCITONIN SALMON 1 SPRAY: 200 SPRAY, METERED NASAL at 11:41

## 2023-09-27 RX ADMIN — PRAVASTATIN SODIUM 20 MG: 20 TABLET ORAL at 22:02

## 2023-09-27 RX ADMIN — ACETAMINOPHEN 975 MG: 325 TABLET, FILM COATED ORAL at 09:53

## 2023-09-27 RX ADMIN — Medication 50 MCG: at 17:02

## 2023-09-27 RX ADMIN — BRIMONIDINE TARTRATE AND TIMOLOL MALEATE 1 DROP: 2; 5 SOLUTION/ DROPS OPHTHALMIC at 17:01

## 2023-09-27 RX ADMIN — ACETAMINOPHEN 975 MG: 325 TABLET, FILM COATED ORAL at 17:01

## 2023-09-27 RX ADMIN — HYDROMORPHONE HYDROCHLORIDE 1 MG: 2 TABLET ORAL at 14:22

## 2023-09-27 RX ADMIN — INSULIN GLARGINE 10 UNITS: 100 INJECTION, SOLUTION SUBCUTANEOUS at 17:02

## 2023-09-27 ASSESSMENT — ACTIVITIES OF DAILY LIVING (ADL)
ADLS_ACUITY_SCORE: 35
ADLS_ACUITY_SCORE: 20
ADLS_ACUITY_SCORE: 20
ADLS_ACUITY_SCORE: 21
ADLS_ACUITY_SCORE: 20
ADLS_ACUITY_SCORE: 21
ADLS_ACUITY_SCORE: 20
ADLS_ACUITY_SCORE: 35
ADLS_ACUITY_SCORE: 26
ADLS_ACUITY_SCORE: 20

## 2023-09-27 NOTE — PHARMACY-ADMISSION MEDICATION HISTORY
Pharmacist Admission Medication History    Admission medication history is complete. The information provided in this note is only as accurate as the sources available at the time of the update.    Medication reconciliation/reorder completed by provider prior to medication history? Yes    Information Source(s): Patient and Family member via in-person and phone. Spoke with patient via [employer]  ipad, called daughter Gloria and reviewed medications over the phone    Pertinent Information: -    Changes made to PTA medication list:  Added: None  Deleted: None  Changed: insulin Lantus from 10 units to 18 units qam, ferrous glucoanate from daily to BID    Medication Affordability:  Not including over the counter (OTC) medications, was there a time in the past 3 months when you did not take your medications as prescribed because of cost?: Unable to Assess    Allergies reviewed with patient and updates made in EHR: yes    Medication History Completed By: Radha Turner RPH 9/27/2023 11:06 AM    Prior to Admission medications    Medication Sig Last Dose Taking? Auth Provider Long Term End Date   acetaminophen (TYLENOL) 325 MG tablet Take 3 tablets (975 mg) by mouth every 8 hours 9/26/2023 Yes Pollo Barillas MD     aspirin (ASA) 81 MG chewable tablet Take 81 mg by mouth daily 9/26/2023 Yes Unknown, Entered By History     brimonidine-timolol (COMBIGAN) 0.2-0.5 % ophthalmic solution Place 1 drop into both eyes 2 times daily 9/26/2023 Yes Unknown, Entered By History     docusate sodium (COLACE) 100 MG capsule Take 100 mg by mouth daily 9/26/2023 Yes Unknown, Entered By History     empagliflozin (JARDIANCE) 10 MG TABS tablet Take 10 mg by mouth daily 9/26/2023 Yes Unknown, Entered By History     Ferrous Gluconate 324 (37.5 Fe) MG TABS Take 324 mg by mouth 2 times daily 9/26/2023 Yes Unknown, Entered By History     insulin glargine (LANTUS PEN) 100 UNIT/ML pen Inject 18 Units Subcutaneous every morning 9/26/2023 Yes  Unknown, Entered By History No    insulin lispro (HUMALOG KWIKPEN) 100 UNIT/ML (1 unit dial) KWIKPEN Inject 2 Units Subcutaneous 2 times daily (before meals) 9/26/2023 Yes Unknown, Entered By History Yes    levocetirizine (XYZAL) 5 MG tablet Take 5 mg by mouth every evening Unknown Yes Unknown, Entered By History     oxyCODONE (ROXICODONE) 5 MG tablet Take 0.5 tablets (2.5 mg) by mouth every 4 hours as needed for moderate pain unknown Yes Pollo Barillas MD     pravastatin (PRAVACHOL) 20 MG tablet Take 1 tablet (20 mg) by mouth At Bedtime 9/26/2023 Yes Pollo Barillas MD Yes    senna-docusate (SENOKOT-S/PERICOLACE) 8.6-50 MG tablet Take 1 tablet by mouth daily 9/26/2023 Yes Pollo Barillas MD     vitamin D3 (CHOLECALCIFEROL) 50 mcg (2000 units) tablet Take 1 tablet by mouth daily 9/26/2023 Yes Unknown, Entered By History     darbepoetin claudette (ARANESP, ALBUMIN FREE,) 60 MCG/0.3ML injection Inject 60 mcg Subcutaneous every 14 days unknown  Unknown, Entered By History

## 2023-09-27 NOTE — PLAN OF CARE
Goal Outcome Evaluation:            [facility]    ED Boarding Nurse Handoff Addendum Report:    Date/time: 9/27/2023, 5:54 AM    Activity Level: in bed    Fall Risk: Yes:  arm band in place, patient and family education, activity supervised, and room door open    Active Infusions: none    Current Meds Due: none    Current care needs: turn and reposition,     Oxygen requirements (liters/min and/or FiO2): none    Respiratory status: Room air    Vital signs (within last 30 minutes):    Vitals:    09/27/23 0022 09/27/23 0145 09/27/23 0444 09/27/23 0530   BP: (!) 175/74  (!) 141/73    BP Location: Right arm  Right arm    Pulse: 83  72    Resp:  14 14 14   Temp: 98  F (36.7  C)      TempSrc: Oral      SpO2: 97%  99%        Focused assessment within last 30 minutes:    Pt is alert and oriented, [place] speaking. Pt rates back  pain 6 out of 10, scheduled tylenol and prn oral dilaudid given with some relief. Pt incontinent of urine, external cath in place. Per MD note, pt to remain on bedrest until TLSO brace is available.     ED Boarding Nurse name: Joselyn Humphrey RN

## 2023-09-27 NOTE — PROGRESS NOTES
Contacted regarding 81 yo female known to NS as being followed for lumbar compression fractures.  Recently evaluated in our clinic 9/11/23 and provided a TLSO brace. Presenting to ER this evening with increased back pain.  Imaging reveals increased height loss of known L2 compression fracture.    Lumbar CT:  IMPRESSION:     1.  Progressive height loss associated with known recent compression fracture of the L2 vertebral body, now with approximately 50% height loss centrally. There is trace bony retropulsion involving the L2 inferior endplate without substantial spinal canal   compromise.  2.  Similar mild compressive deformities of the L4 inferior endplate and L5 superior endplate.  3.  No evidence of new acute fracture involving the lumbar spine.  4.  No high-grade spinal canal or neural foraminal stenosis. Neural foraminal stenosis is greatest and moderate bilaterally at L4-L5 as seen on recent MRI.    RECOMMENDATIONS:  Planning to admit for pain control.  NS will see in consultation tomorrow  Continue TLSO when out of bed    STACY Dunaway  [facility]  [address] [address]    Tel [phone]  Pager [phone]

## 2023-09-27 NOTE — H&P
/[facility]    History and Physical - Hospitalist Service       Date of Admission:  9/26/2023    Assessment & Plan      June MYLES Do is a 82 year old female with past medical history significant for chronic kidney disease, stage 3b/4, type 2 diabetes /mellitus, cognitive impairment, history of CVA, chronic microcytic anemia, and recent admission to [facility] 8/9/2023-8/14/2023 for L2, L4, and L5 compression fractures in the setting of multiple falls/deconditioning. She was seen by NSG and treated non-operatively with TLSO bracing. She presented to [facility] on 9/26/2023 with worsening back pain and was found to have progressively height loss of L2 compression fracture.    Progressive L2 Compression Fracture  Subacute L4, L5 Compression Fractures  Increased Lumbar Back Pain  Recent admission for back pain and found to have L2, L4, and L5 compression fractures. Was wearing TLSO brace as previously prescribed, but with worsening back pain over last few days. CT lumbar spine obtained sowing progressive height loss at L2 vertebral body with trace retropulsion of interior endplate, but without impingement on spinal canal. No obvious neurologic compromise. NSG consulted from emergency department, who recommended pain control and continued bracing when out of bed. Patient stated she has been wearing the brace when out of the home, but does not wear her brace when at home. Unfortunately, she does not have her brace with her in the hospital and it may need to be obtained prior to being able to work with physical therapy or get up out of bed. Does have a history of osteoporosis, which I suspect is contributing. Starting calcitonin as does not appear to be on bisphosphonate, or other. Calcitonin may also help with pain.  -NSG consulted, appreciate recommendations  -TLSO brace will need to be obtained; bedrest until then  -Dilaudid 1mg PO Q3H PRN  -Calcitonin 1 spray  "intranasal daily  -PT/OT once brace obtained    Chronic Kidney Disease, Stage 3b/4: Creatinine 1.5; near baseline 1.4-1.9. Monitoring. Avoid nephrotoxins.  Poorly Controlled Type 2 Diabetes Mellitus: Lantus 10mg + 1:15 CHO + LDSSI. A1C 8/2023 = 12.9.  Cognitive Impairment: Was able to provide history of back pain, but lacked significant detail about injury.  Chronic Microcytic, Hypochromic Anemia: Hgb 9.4, increased from prior.     Diet: Regular Diet Adult    DVT Prophylaxis: Pneumatic Compression Devices  Sotelo Catheter: Not present  Lines: None     Cardiac Monitoring: None  Code Status: Full Code      Clinically Significant Risk Factors Present on Admission                # Drug Induced Platelet Defect: home medication list includes an antiplatelet medication       # DMII: A1C = 12.9 % (Ref range: <5.7 %) within past 6 months   # Cachexia: Estimated body mass index is 16.15 kg/m  as calculated from the following:    Height as of 9/11/23: 1.575 m (5' 2\").    Weight as of 9/11/23: 40.1 kg (88 lb 4.8 oz).              Disposition Plan      Expected Discharge Date: 09/28/2023                  Dwayne Chaparro MD  Hospitalist Service  [facility]  Securely message with [employer] (more info)  Text page via [employer] Messenger Paging/Directory     ______________________________________________________________________    Chief Complaint   Back Pain    History is obtained from the patient and emergency department physician    History of Present Illness   June MYLES Do is a 82 year old female with past medical history significant for chronic kidney disease, stage 3b/4, type 2 diabetes /mellitus, cognitive impairment, history of CVA, chronic microcytic anemia, and recent admission to [facility] 8/9/2023-8/14/2023 for L2, L4, and L5 compression fractures who presented to [facility] for worsening back pain.    Patient reportedly had been admitted to [facility] 8/9/2023-8/14/2023 for back pain and was found " to have L2/L4/L5 vertebral compression fractures. She was provided with a TLSO and ultimately discharged to a TCU. She had been doing well until a couple of days ago when patient reportedly bent to pick something up from the ground and had acute worsening of her lower back pain. Reportedly, patient had only been wearing her TLSO brace when out of the house and patient had not been wearing it at the time of repeat injury. She presented to [facility] on 9/26/2023 with worsening back pain. CT showing progressive loss of height of L2 vertebral body with trace retropulsion, but without canal compression. Received pain medications, which patient reported helped.    In the emergency department, she was found to be afebrile. Pulse was 77-84. She was hypertensive to 162-183/73-83. She had normal oxygen saturations on room air.  Laboratory studies were notable for BUN 31.5, creatinine 1.5, glucose 149, hemoglobin 9.4, hematocrit 31.0.  CT lumbar spine without contrast revealed progressive height loss of the L2 vertebral body, now with approximately 50% height loss centrally.  There is also trace retropulsion of the inferior endplate but without significant spinal canal compromise.  The compression fractures at L4 and L5 endplates were similar in appearance to prior.  CT of the patient's pelvis without acute findings.  She was provided with pain control.  Neurosurgery was consulted by the emergency room physician who recommended bringing patient to the hospital for pain control and formal neurosurgery consult tomorrow.      Past Medical History    Past Medical History:   Diagnosis Date    Diabetes (H)        Past Surgical History   No past surgical history on file.    Prior to Admission Medications   Prior to Admission Medications   Prescriptions Last Dose Informant Patient Reported? Taking?   Ferrous Gluconate 324 (37.5 Fe) MG TABS   Yes No   Sig: Take 324 mg by mouth daily   acetaminophen (TYLENOL) 325 MG tablet   No No   Sig: Take  "3 tablets (975 mg) by mouth every 8 hours   aspirin (ASA) 81 MG chewable tablet   Yes No   Sig: Take 81 mg by mouth daily   brimonidine-timolol (COMBIGAN) 0.2-0.5 % ophthalmic solution   Yes No   Sig: Place 1 drop into both eyes 2 times daily   darbepoetin claudette (ARANESP, ALBUMIN FREE,) 60 MCG/0.3ML injection   Yes No   Sig: Inject 60 mcg Subcutaneous every 14 days   docusate sodium (COLACE) 100 MG capsule   Yes No   Sig: Take 100 mg by mouth daily   empagliflozin (JARDIANCE) 10 MG TABS tablet   Yes No   Sig: Take 10 mg by mouth daily   insulin glargine (LANTUS PEN) 100 UNIT/ML pen   No No   Sig: Inject 10 Units Subcutaneous every morning (before breakfast)   insulin lispro (HUMALOG KWIKPEN) 100 UNIT/ML (1 unit dial) KWIKPEN   Yes No   Sig: Inject 2 Units Subcutaneous 2 times daily (before meals)   levocetirizine (XYZAL) 5 MG tablet   Yes No   Sig: Take 5 mg by mouth every evening   oxyCODONE (ROXICODONE) 5 MG tablet   No No   Sig: Take 0.5 tablets (2.5 mg) by mouth every 4 hours as needed for moderate pain   pravastatin (PRAVACHOL) 20 MG tablet   No No   Sig: Take 1 tablet (20 mg) by mouth At Bedtime   senna-docusate (SENOKOT-S/PERICOLACE) 8.6-50 MG tablet   No No   Sig: Take 1 tablet by mouth daily   vitamin D3 (CHOLECALCIFEROL) 50 mcg (2000 units) tablet   Yes No   Sig: Take 1 tablet by mouth daily      Facility-Administered Medications: None          Physical Exam   Temp: 99  F (37.2  C) Temp src: Oral BP: (!) 175/74 Pulse: 83   Resp: 14 SpO2: 97 % O2 Device: None (Room air) Oxygen Delivery: 1 LPM      Estimated body mass index is 16.15 kg/m  as calculated from the following:    Height as of 9/11/23: 1.575 m (5' 2\").    Weight as of 9/11/23: 40.1 kg (88 lb 4.8 oz).    General: Very pleasant female resting comfortably in hospital bed.  Awake, alert, interactive.  [place]  via telephone was utilized during the interview.  HEENT: Normocephalic, atraumatic.  PERRL, EOMI.  Conjunctiva clear, sclerae " anicteric.  Mucous membranes moist.  Cardiac: Regular rate and rhythm without murmur, gallop, or rub.  No peripheral edema.  Respiratory: Normal work of breathing.  Clear to auscultation bilaterally without wheezing, rales, or rhonchi.  GI: Normal, active bowel sounds.  Abdomen soft, nontender, nondistended.  : Deferred.  Musculoskeletal: Patient laying very still and flat in bed.  Normal  strength bilaterally.  Normal active and passive motion of bilateral feet.  Skin: No rashes or abrasions on exposed skin.  Neurologic: Alert and oriented x4.  Cranial nerves II through XII grossly intact.  Psychologic: Appropriate mood and affect.      Medical Decision Making       55 MINUTES SPENT BY ME on the date of service doing chart review, history, exam, documentation & further activities per the note.      Data     I have personally reviewed the following data over the past 24 hrs:    9.1  \   9.4 (L)   / 242     137 100 31.5 (H) /  154 (H)   3.6 25 1.50 (H) \       Imaging results reviewed over the past 24 hrs:   Recent Results (from the past 24 hour(s))   Lumbar spine CT w/o contrast    Narrative    EXAM: CT LUMBAR SPINE W/O CONTRAST  LOCATION: [facility]  DATE: 9/26/2023    INDICATION: lower back pain  COMPARISON: Lumbar spine radiographs: 09/11/2023; lumbar spine MRI: 08/09/2023.  TECHNIQUE: Routine CT Lumbar Spine without IV contrast. Multiplanar reformats. Dose reduction techniques were used.     FINDINGS:  VERTEBRA: Progressive height loss associated with known recent compression fracture of the L2 vertebral body, which now measures approximately 50% centrally. Trace bony retropulsion involving the L2 inferior endplate without substantial spinal canal   compromise. No progressive height loss associated with mild compressive deformities involving the L4 inferior endplate and L5 superior endplate. No evidence of new acute fracture. Osteopenia.    CANAL/FORAMINA: Overall mild multilevel  degenerative disc disease throughout the lumbar spine without high-grade spinal canal stenosis. Generally mild facet arthropathy throughout the lumbar spine. No high-grade neural foraminal stenosis. Neural   foraminal stenosis is most pronounced and moderate bilaterally at L4-L5.    PARASPINAL: Mild prevertebral edema along the anterior margin of the L2 vertebral body. Calcified atherosclerosis of the abdominal aorta and branch vessels without aneurysm as imaged.      Impression    IMPRESSION:    1.  Progressive height loss associated with known recent compression fracture of the L2 vertebral body, now with approximately 50% height loss centrally. There is trace bony retropulsion involving the L2 inferior endplate without substantial spinal canal   compromise.  2.  Similar mild compressive deformities of the L4 inferior endplate and L5 superior endplate.  3.  No evidence of new acute fracture involving the lumbar spine.  4.  No high-grade spinal canal or neural foraminal stenosis. Neural foraminal stenosis is greatest and moderate bilaterally at L4-L5 as seen on recent MRI.   CT Pelvis Bone wo Contrast    Narrative    EXAM: CT PELVIS BONE WO CONTRAST  LOCATION: [facility]  DATE: 9/26/2023    INDICATION: 82-year-old patient with pelvic and low back pain.  COMPARISON: 08/09/2023 lumbar spine MRI.  TECHNIQUE: CT scan of the pelvis was performed without IV contrast. Multiplanar reformats were obtained. Dose reduction techniques were used.  CONTRAST: None.    FINDINGS:    BONES AND JOINTS:  -No pelvic or proximal femur fracture.   -Mild L5 superior endplate depression.  -Mild bilateral sacroiliac and hip degenerative arthrosis.    MUSCLES AND SOFT TISSUES:   -No asymmetric muscle enlargement or atrophy.   -No soft tissue fluid collection.    OTHER:   -No free fluid in the pelvis.      Impression    IMPRESSION:  1.  No pelvic or proximal femur fracture.  2.  Depression of the L5 superior endplate,  similar since the 08/09/2023 MRI.  3.  No acute soft tissue or myotendinous pathology is evident.

## 2023-09-27 NOTE — CONSULTS
NEUROSURGERY CONSULT      PLAN:    Ms. Shultz's most recent imaging exhibits a progressive height loss associated with known recent compression fracture of the L2 vertebral body, now with approximately 50% height loss centrally. There is trace bony retropulsion involving the L2 inferior endplate without substantial spinal canal compromise which does correlate with her objective findings on the physical exam.     Based on her physical exam and imaging review, we do not see any indication for surgical intervention although a consultation with IR could be considered for a vertebroplasty.      For now, we feel that it would be in her best interest to proceed with a conservative approach by pain management set forth by the Medical team here at [facility]. We will continue to have her wear her TLSO and follow-up with us at her regularly scheduled appointment on 11/06/2023 with updated imaging.     We do suggest that she not lift anything greater than 5-10 pounds and avoid excessive bending, twisting, and turning.     It has been a pleasure working with Ms. Shultz.  For now, she no longer requires additional in-patient Neurosurgical follow-up, but if concerns arise we would gladly assist with this. We did discuss signs of a worsening problem that she should seek being evaluated.     We will sign off at this point. Please page our on-call service for any questions or concerns.     It has been a pleasure meeting June MYLES Do. Thank you for having us be involved in her care.    ______________________________________________________________________    HPI:    June MYLES Do is a pleasant 82 year old female who is well known to our practice as we have been following her for L2, L4 and L5 fractures. She has been wearing a TLSO. Unfortunately she presented to the [facility] Emergency Department via EMS yesterday for consultation on exacerbated back pain after bending over to pick something up. She denies any radicular symptoms to  include numbness, paresthesia or perceived weakness. There are no bowel or bladder changes. No other concerns are voiced.    Past Medical History:   Diagnosis Date    Diabetes (H)        No past surgical history on file.    No Known Allergies    Social History     Tobacco Use    Smoking status: Never    Smokeless tobacco: Never   Substance Use Topics    Alcohol use: Not Currently       No family history on file.    Scheduled Medications     acetaminophen  975 mg Oral Q8H    brimonidine-timolol  1 drop Both Eyes BID    calcitonin (salmon)  1 spray Alternating Nostrils Daily    darbepoetin claudette  60 mcg Subcutaneous Q14 Days    docusate sodium  100 mg Oral Daily    Ferrous Gluconate  324 mg Oral Daily    insulin aspart   Subcutaneous TID w/meals    insulin aspart  1-3 Units Subcutaneous TID AC    insulin aspart  1-3 Units Subcutaneous At Bedtime    insulin glargine  10 Units Subcutaneous QAM AC    polyethylene glycol  17 g Oral Daily    pravastatin  20 mg Oral At Bedtime    senna-docusate  1 tablet Oral Daily    vitamin D3  50 mcg Oral Daily       Home Medications    Aranesp   Jardiance  Lantus pen  Humalog  Pravastatin  Fergon  Cozaar    PRN Medications    bisacodyl, glucose **OR** dextrose **OR** glucagon, HYDROmorphone, melatonin, ondansetron **OR** ondansetron, prochlorperazine **OR** prochlorperazine **OR** prochlorperazine    ROS: 10 point ROS neg other than the symptoms noted above in the HPI.    Vitals:    /73 (BP Location: Right arm)   Pulse 67   Temp 97.4  F (36.3  C) (Oral)   Resp 14   SpO2 97%   There is no height or weight on file to calculate BMI.  No intake/output data recorded.    Exam:    Patient appears comfortable, conversational, and in no apparent distress.   Head: Normocephalic, without obvious abnormality, atraumatic, no facial asymmetry.   Eyes: conjunctivae/corneas clear. PERRL, EOM's intact.   Throat: lips, mucosa, and tongue normal; teeth and gums normal.   Neck: supple,  symmetrical, trachea midline, no adenopathy and thyroid: not enlarged, symmetric, no tenderness/mass/nodules.   Lungs: clear to auscultation bilaterally.   Heart: regular rate and rhythm.   Abdomen: soft, non-tender; bowel sounds normal; no masses, no organomegaly.   Pulses: 2+ and symmetric.   Skin: Skin color, texture, turgor normal. No rashes or lesions.     CN II-XII grossly intact, alert and appropriate with conversation and following commands.   Cervical spine is non tender to palpation. Appropriate range of motion of neck, not concerning for lhermitte's phenomenon.   Bilateral bicep 2/4 and tricep reflexes 1/4. Sensation intact throughout upper extremities.     UE muscle strength  Right  Left    Deltoid  5/5  5/5    Biceps  5/5  5/5    Triceps  5/5  5/5    Hand intrinsics  5/5  5/5    Hand grasp  5/5  5/5    Royal signs  neg  neg      Lumbar spine is slightly tender to palpation.   Intact sensation throughout lower extremities.   Bilateral patellar 2/4 and achilles reflex 1/4.     LE muscle strength  Right  Left    Iliopsoas (hip flexion)  5/5  5/5    Quad (knee extension)  5/5  5/5    Hamstring (knee flexion)  5/5  5/5    Gastrocnemius (PF)  5/5  5/5    Tibialis Ant. (DF)  5/5  5/5    EHL  5/5  5/5      Negative Babinski bilaterally. Negative for clonus.   Calves are soft and non-tender bilaterally.     Imaging:  CT LUMBAR SPINE W/O CONTRAST - 9/26/2023  Impression per radiology read - I have personally reviewed the images with the patient.    1.  Progressive height loss associated with known recent compression fracture of the L2 vertebral body, now with approximately 50% height loss centrally. There is trace bony retropulsion involving the L2 inferior endplate without substantial spinal canal   compromise.  2.  Similar mild compressive deformities of the L4 inferior endplate and L5 superior endplate.  3.  No evidence of new acute fracture involving the lumbar spine.  4.  No high-grade spinal canal or  neural foraminal stenosis. Neural foraminal stenosis is greatest and moderate bilaterally at L4-L5 as seen on recent MRI.    Available labs at time of consult:       Recent Labs   Lab 09/27/23  0822 09/26/23  1736   WBC 6.5 9.1   HGB 9.2* 9.4*   HCT 30.7* 31.0*   MCV 66* 65*    242     Recent Labs   Lab 09/27/23  0822 09/26/23  1736   WBC 6.5 9.1   HGB 9.2* 9.4*   HCT 30.7* 31.0*   MCV 66* 65*    242     No results for input(s): SED, CRP in the last 168 hours.  Recent Labs   Lab 09/27/23  0822 09/26/23  1736   HGB 9.2* 9.4*     No results for input(s): INR in the last 168 hours.  Recent Labs   Lab 09/27/23  0822 09/26/23  1736    242     Recent Labs   Lab 09/27/23  0822 09/26/23  1736   WBC 6.5 9.1         Respectfully,    STACY Zafar, PA-C  [facility]  [facility]     Tel: [phone]      All imaging, physical findings, and the above plan have been reviewed with Dr. Gonzalez.

## 2023-09-27 NOTE — ED NOTES
RECEIVING UNIT ED HANDOFF REVIEW    Above ED Nurse Handoff Report was reviewed: Yes  Reviewed by: Elsie Fu RN on September 27, 2023 at 1:25 PM       [facility]  ED Nurse Handoff Report    ED Chief complaint: Back Pain  . ED Diagnosis:   Final diagnoses:   Compression fracture of L2, with delayed healing, subsequent encounter       Allergies: No Known Allergies    Code Status: Full Code    Activity level - Baseline/Home:  independent.  Activity Level - Current:   assist of 1.   Lift room needed: No.   Bariatric: No   Needed: Yes   Isolation: No.   Infection: Not Applicable.     Respiratory status: Room air    Vital Signs (within 30 minutes):   Vitals:    09/26/23 1745 09/26/23 1845 09/26/23 1903 09/26/23 1912   BP: (!) 176/83 (!) 186/86  (!) 183/78   Pulse: 84 80  82   Resp:       Temp:       TempSrc:       SpO2: 99% 98% 94% 95%       Cardiac Rhythm:  ,      Pain level:    Patient confused: Yes.   Patient Falls Risk: activity supervised.   Elimination Status:  Has not voided in ED      Patient Report - Initial Complaint: Back Pain.   Focused Assessment: June MYLES Do is a 82 year old female presents emergency department via EMS for lower back pain.  Reportedly she had previously injured her back and then recently felt sudden pain when bending over to pick something up.  EMS was called today she was given 100 mcg of fentanyl in route.  Currently she says the pain feels better, speaks Slovenian predominantly but does speak and seem to understand some English.        Abnormal Results:   Labs Ordered and Resulted from Time of ED Arrival to Time of ED Departure   BASIC METABOLIC PANEL - Abnormal       Result Value    Sodium 137      Potassium 3.6      Chloride 100      Carbon Dioxide (CO2) 25      Anion Gap 12      Urea Nitrogen 31.5 (*)     Creatinine 1.50 (*)     GFR Estimate 35 (*)     Calcium 9.1      Glucose 149 (*)    GLUCOSE BY METER - Abnormal    GLUCOSE BY METER POCT 161 (*)     CBC WITH PLATELETS AND DIFFERENTIAL - Abnormal    WBC Count 9.1      RBC Count 4.76      Hemoglobin 9.4 (*)     Hematocrit 31.0 (*)     MCV 65 (*)     MCH 19.7 (*)     MCHC 30.3 (*)     RDW 16.7 (*)     Platelet Count 242      % Neutrophils 76      % Lymphocytes 15      % Monocytes 7      % Eosinophils 1      % Basophils 0      % Immature Granulocytes 1      NRBCs per 100 WBC 0      Absolute Neutrophils 6.9      Absolute Lymphocytes 1.3      Absolute Monocytes 0.7      Absolute Eosinophils 0.1      Absolute Basophils 0.0      Absolute Immature Granulocytes 0.1      Absolute NRBCs 0.0          CT Pelvis Bone wo Contrast   Final Result   IMPRESSION:   1.  No pelvic or proximal femur fracture.   2.  Depression of the L5 superior endplate, similar since the 08/09/2023 MRI.   3.  No acute soft tissue or myotendinous pathology is evident.      Lumbar spine CT w/o contrast   Final Result   IMPRESSION:      1.  Progressive height loss associated with known recent compression fracture of the L2 vertebral body, now with approximately 50% height loss centrally. There is trace bony retropulsion involving the L2 inferior endplate without substantial spinal canal    compromise.   2.  Similar mild compressive deformities of the L4 inferior endplate and L5 superior endplate.   3.  No evidence of new acute fracture involving the lumbar spine.   4.  No high-grade spinal canal or neural foraminal stenosis. Neural foraminal stenosis is greatest and moderate bilaterally at L4-L5 as seen on recent MRI.          Treatments provided: CT, Bloodwork, IV  Family Comments: Son and daughter at bedside  OBS brochure/video discussed/provided to patient:  Yes  ED Medications: Medications - No data to display    Drips infusing:  No  For the majority of the shift this patient was Green.   Interventions performed were NA.    Sepsis treatment initiated: No    Cares/treatment/interventions/medications to be completed following ED care: See MAR    ED  Nurse Name: Sylvia Wade RN  8:23 PM

## 2023-09-27 NOTE — PLAN OF CARE
Goal Outcome Evaluation:      Plan of Care Reviewed With: patient    Overall Patient Progress: no changeOverall Patient Progress: no change         PRIMARY DIAGNOSIS: ACUTE PAIN  OUTPATIENT/OBSERVATION GOALS TO BE MET BEFORE DISCHARGE:  1. Pain Status: No improvement noted. 1mg Oral Dilaudid given, pending reassessment    2. Return to near baseline physical activity: No    3. Cleared for discharge by consultants (if involved): No    Discharge Planner Nurse   Safe discharge environment identified: No  Barriers to discharge: Yes       Entered by: Elsie Fu RN 09/27/2023 3:46 PM     Please review provider order for any additional goals.   Nurse to notify provider when observation goals have been met and patient is ready for discharge..    I-pad Hebrew interpretor used for assessment and cares. Pts VSS on r/a. LS clear, denies CP, denies N&V. Pt tolerating oral meds and a regular diet. PT eval in progress with pts TLSO brace that has arrived from home, pending PT recommendation for discharge planning. Pt states that daughter is her primary contact and discharge planner. T-pump ordered. Ice offered, pt declined.

## 2023-09-27 NOTE — PROGRESS NOTES
"Assuming care.  Patient admitted early this morning by my partner Dr. Chaparro.  She has vertebral compression fractures and unbearable pain.  She has been failing outpatient treatment.  Patient seen, interviewed and examined by me.  Chart reviewed.  Vital signs:  Temp: 97.6  F (36.4  C) Temp src: Oral BP: (Abnormal) 143/64 Pulse: 71   Resp: 14 SpO2: 98 % O2 Device: None (Room air) Oxygen Delivery: 1 LPM      Estimated body mass index is 16.15 kg/m  as calculated from the following:    Height as of 9/11/23: 1.575 m (5' 2\").    Weight as of 9/11/23: 40.1 kg (88 lb 4.8 oz).    At the moment of my visit she reports a pain 7/10 intensity.  She admits of having relief once she has pain medication.  No other concerns.    I agree with assessment and plan as outlined by Dr. Chaparro    "

## 2023-09-27 NOTE — PROVIDER NOTIFICATION
Pts , per order pt due for 3u Novolog for correction; given. Pt did not receive AM PTA 10u Lantus, provider notified d/t BG trending up    1640  Verbal order to give AM 10u Lantus w/ Novolog correction of 3u.

## 2023-09-27 NOTE — PROGRESS NOTES
ROOM # 226    Living Situation (if not independent, order SW consult):  Facility name:  : Daughter; Gloria    Activity level at baseline: A1 w/ family  Activity level on admit: A1-2; bedrest pending TLSO brace arrival    Who will be transporting you at discharge: Daughter    Patient registered to observation; given Patient Bill of Rights; given the opportunity to ask questions about observation status and their plan of care.  Patient has been oriented to the observation room, bathroom and call light is in place.    Discussed discharge goals and expectations with patient/family.

## 2023-09-27 NOTE — PROGRESS NOTES
[facility]    ED Boarding Nurse Handoff Addendum Report:    Date/time: 9/27/2023, 1:33 PM    Activity Level: in bed and Bedrest until TLSO brace obtained    Fall Risk: Yes:  nonskid shoes/slippers when out of bed, arm band in place, patient and family education, assistive device/personal items within reach, and activity supervised    Active Infusions: NA     Current Meds Due: Lantus     Current care needs: Pain control, eval neuro/PT/OT    Oxygen requirements (liters/min and/or FiO2): NA    Respiratory status: Room air    Vital signs (within last 30 minutes):    Vitals:    09/27/23 0444 09/27/23 0530 09/27/23 0729 09/27/23 1208   BP: (!) 141/73  (!) 144/71 122/73   BP Location: Right arm  Right arm Right arm   Pulse: 72  64 67   Resp: 14 14 14 14   Temp:   97.6  F (36.4  C) 97.4  F (36.3  C)   TempSrc:   Oral Oral   SpO2: 99%  97% 97%       Focused assessment within last 30 minutes:    A&O x4, bedrest until brace obtained. Albanian speaking, understands some english, use interpretor for more complex conversation. Pain under control this shift with scheduled tylenol. Purewick in place. Calcitrol sent with patient transport to floor, needs refrigeration. Novolog also sent with.     ED Boarding Nurse name: Viky Mejias RN

## 2023-09-27 NOTE — PROGRESS NOTES
09/27/23 1500   Appointment Info   Signing Clinician's Name / Credentials (PT) Kasandra Quiñonez DPT   Rehab Comments (PT) Brace with OOB mobility       Present yes   Language Luxembourger   Living Environment   People in Home child(thalia), adult   Current Living Arrangements apartment   Self-Care   Equipment Currently Used at Home cane, straight;shower chair   Fall history within last six months yes   Number of times patient has fallen within last six months 1   Activity/Exercise/Self-Care Comment Pt reports after returning home from TCU, pt was able to ambulate and perform toileting on her own with a SEC. Dtg has been helping with showers and IADLs   General Information   Onset of Illness/Injury or Date of Surgery 09/26/23   Referring Physician Dwayne Chaparro MD   Pertinent History of Current Problem (include personal factors and/or comorbidities that impact the POC) June MYLES Do is a 82 year old female with past medical history significant for chronic kidney disease, stage 3b/4, type 2 diabetes /mellitus, cognitive impairment, history of CVA, chronic microcytic anemia, and recent admission to [facility] 8/9/2023-8/14/2023 for L2, L4, and L5 compression fractures in the setting of multiple falls/deconditioning. She was seen by NSG and treated non-operatively with TLSO bracing. She presented to [facility] on 9/26/2023 with worsening back pain and was found to have progressively height loss of L2 compression fracture.   Existing Precautions/Restrictions brace worn when out of bed;spinal   Cognition   Affect/Mental Status (Cognition) flat/blunted affect   Orientation Status (Cognition) person;place   Follows Commands (Cognition) follows one-step commands;75-90% accuracy;repetition of directions required   Pain Assessment   Patient Currently in Pain Yes, see Vital Sign flowsheet  (7/10 pain, was premedicated)   Posture    Posture Forward head position;Protracted shoulders    Range of Motion (ROM)   ROM Comment Limited due to pain and spinal precautions   Strength (Manual Muscle Testing)   Strength (Manual Muscle Testing) Deficits observed during functional mobility   Bed Mobility   Comment, (Bed Mobility) mod A   Transfers   Comment, (Transfers) min A, FWW   Gait/Stairs (Locomotion)   Comment, (Gait/Stairs) SBA, FWW, limited step height and limited step length   Balance   Balance Comments needing B UE support   Clinical Impression   Criteria for Skilled Therapeutic Intervention Yes, treatment indicated   PT Diagnosis (PT) decreased functional mobility   Influenced by the following impairments decreased ROM, pain, decreased functional strength   Functional limitations due to impairments decreased bed mob, transfers, ADLs   Clinical Presentation (PT Evaluation Complexity) Stable/Uncomplicated   Clinical Presentation Rationale improving   Clinical Decision Making (Complexity) low complexity   Planned Therapy Interventions (PT) balance training;bed mobility training;gait training;home exercise program;patient/family education;strengthening;orthotic fitting/training;risk factor education;home program guidelines;progressive activity/exercise;transfer training   Anticipated Equipment Needs at Discharge (PT) walker, rolling  (Pt has walker at home but was not using)   Risk & Benefits of therapy have been explained evaluation/treatment results reviewed;care plan/treatment goals reviewed;risks/benefits reviewed;current/potential barriers reviewed;participants voiced agreement with care plan;participants included;patient   PT Total Evaluation Time   PT Eval, Low Complexity Minutes (66250) 5   Physical Therapy Goals   PT Frequency Daily   PT Predicted Duration/Target Date for Goal Attainment 09/30/23   PT Goals Bed Mobility;Transfers;Gait   PT: Bed Mobility Supine to/from sit;Independent   PT: Transfers Modified independent;Sit to/from stand;Bed to/from chair;Assistive device;Within precautions    PT: Gait Modified independent;50 feet   Interventions   Interventions Quick Adds Therapeutic Activity;Gait Training   Therapeutic Activity   Therapeutic Activities: dynamic activities to improve functional performance Minutes (26834) 13   Symptoms Noted During/After Treatment Increased pain   Treatment Detail/Skilled Intervention Pt was educated on goal of spinal precuations to help heal fx and decrease pain with mobility. Pt was educated on log roll, pt was performing more of a sit up instead, but still unable to peform on her own. Pt needing mod assist for supine to sit. max A for sit to supine. Pt grandson present for education as well. Pt was cued for not bending with standing and was able to perform better with subsequent transitions, 2 more with SBA following min A with eval.   Gait Training   Gait Training Minutes (48001) 10   Symptoms Noted During/After Treatment (Gait Training) increased pain   Treatment Detail/Skilled Intervention Pt was cued for improving foot clearance and step length, mild improvements noted. FWW used for improving stability as poor balance at EOB with pregait on eval. Pt was able to tolerate 50 feet of ambulation   PT Discharge Planning   PT Plan Progress bed mob, transfers, ambulation   PT Discharge Recommendation (DC Rec) Transitional Care Facility   PT Rationale for DC Rec Pt currently needing A with bed mobility, and dtg is not home during day to assist. Pt would benefit from return to TCU to improve mobility at this time. If not able to return to TCU recommend A X 1 with mobility and A with ADLs.   PT Brief overview of current status 50 feet, mod A for bed mob   Total Session Time   Timed Code Treatment Minutes 23   Total Session Time (sum of timed and untimed services) 28

## 2023-09-27 NOTE — PROGRESS NOTES
Writer assigned as patients primary RN on OBS, pt currently in ED. Called ED RN for information on pts TLSO brace, ED has no information.     Writer called pts Daughter/primary contact and requested TLSO brace be sent from home so pt can have her ordered PT evaluation. Daughter said she would send it up right away with her son.

## 2023-09-28 ENCOUNTER — APPOINTMENT (OUTPATIENT)
Dept: PHYSICAL THERAPY | Facility: CLINIC | Age: 83
End: 2023-09-28
Payer: COMMERCIAL

## 2023-09-28 LAB
GLUCOSE BLDC GLUCOMTR-MCNC: 176 MG/DL (ref 70–99)
GLUCOSE BLDC GLUCOMTR-MCNC: 204 MG/DL (ref 70–99)
GLUCOSE BLDC GLUCOMTR-MCNC: 213 MG/DL (ref 70–99)
GLUCOSE BLDC GLUCOMTR-MCNC: 225 MG/DL (ref 70–99)
GLUCOSE BLDC GLUCOMTR-MCNC: 256 MG/DL (ref 70–99)
GLUCOSE BLDC GLUCOMTR-MCNC: 259 MG/DL (ref 70–99)

## 2023-09-28 PROCEDURE — 82962 GLUCOSE BLOOD TEST: CPT

## 2023-09-28 PROCEDURE — 250N000013 HC RX MED GY IP 250 OP 250 PS 637: Performed by: STUDENT IN AN ORGANIZED HEALTH CARE EDUCATION/TRAINING PROGRAM

## 2023-09-28 PROCEDURE — 97116 GAIT TRAINING THERAPY: CPT | Mod: GP | Performed by: PHYSICAL THERAPIST

## 2023-09-28 PROCEDURE — 250N000013 HC RX MED GY IP 250 OP 250 PS 637: Performed by: PHYSICIAN ASSISTANT

## 2023-09-28 PROCEDURE — 97530 THERAPEUTIC ACTIVITIES: CPT | Mod: GP | Performed by: PHYSICAL THERAPIST

## 2023-09-28 PROCEDURE — G0378 HOSPITAL OBSERVATION PER HR: HCPCS

## 2023-09-28 PROCEDURE — 99232 SBSQ HOSP IP/OBS MODERATE 35: CPT | Performed by: PHYSICIAN ASSISTANT

## 2023-09-28 RX ORDER — NALOXONE HYDROCHLORIDE 0.4 MG/ML
0.2 INJECTION, SOLUTION INTRAMUSCULAR; INTRAVENOUS; SUBCUTANEOUS
Status: DISCONTINUED | OUTPATIENT
Start: 2023-09-28 | End: 2023-09-29 | Stop reason: HOSPADM

## 2023-09-28 RX ORDER — NALOXONE HYDROCHLORIDE 0.4 MG/ML
0.4 INJECTION, SOLUTION INTRAMUSCULAR; INTRAVENOUS; SUBCUTANEOUS
Status: DISCONTINUED | OUTPATIENT
Start: 2023-09-28 | End: 2023-09-29 | Stop reason: HOSPADM

## 2023-09-28 RX ADMIN — POLYETHYLENE GLYCOL 3350 17 G: 17 POWDER, FOR SOLUTION ORAL at 09:37

## 2023-09-28 RX ADMIN — INSULIN ASPART 2 UNITS: 100 INJECTION, SOLUTION INTRAVENOUS; SUBCUTANEOUS at 18:23

## 2023-09-28 RX ADMIN — SENNOSIDES AND DOCUSATE SODIUM 1 TABLET: 8.6; 5 TABLET ORAL at 09:36

## 2023-09-28 RX ADMIN — BRIMONIDINE TARTRATE AND TIMOLOL MALEATE 1 DROP: 2; 5 SOLUTION/ DROPS OPHTHALMIC at 09:46

## 2023-09-28 RX ADMIN — CALCITONIN SALMON 1 SPRAY: 200 SPRAY, METERED NASAL at 11:24

## 2023-09-28 RX ADMIN — Medication 50 MCG: at 09:36

## 2023-09-28 RX ADMIN — ACETAMINOPHEN 975 MG: 325 TABLET, FILM COATED ORAL at 00:44

## 2023-09-28 RX ADMIN — BRIMONIDINE TARTRATE AND TIMOLOL MALEATE 1 DROP: 2; 5 SOLUTION/ DROPS OPHTHALMIC at 20:07

## 2023-09-28 RX ADMIN — DOCUSATE SODIUM 100 MG: 100 CAPSULE, LIQUID FILLED ORAL at 09:36

## 2023-09-28 RX ADMIN — INSULIN ASPART 1 UNITS: 100 INJECTION, SOLUTION INTRAVENOUS; SUBCUTANEOUS at 14:40

## 2023-09-28 RX ADMIN — INSULIN ASPART 3 UNITS: 100 INJECTION, SOLUTION INTRAVENOUS; SUBCUTANEOUS at 11:22

## 2023-09-28 RX ADMIN — EMPAGLIFLOZIN 10 MG: 10 TABLET, FILM COATED ORAL at 09:36

## 2023-09-28 RX ADMIN — HYDROMORPHONE HYDROCHLORIDE 1 MG: 2 TABLET ORAL at 20:07

## 2023-09-28 RX ADMIN — PRAVASTATIN SODIUM 20 MG: 20 TABLET ORAL at 22:13

## 2023-09-28 RX ADMIN — ACETAMINOPHEN 975 MG: 325 TABLET, FILM COATED ORAL at 16:31

## 2023-09-28 RX ADMIN — FERROUS GLUCONATE 324 MG: 324 TABLET ORAL at 09:36

## 2023-09-28 RX ADMIN — ACETAMINOPHEN 975 MG: 325 TABLET, FILM COATED ORAL at 09:36

## 2023-09-28 ASSESSMENT — ACTIVITIES OF DAILY LIVING (ADL)
ADLS_ACUITY_SCORE: 27
ADLS_ACUITY_SCORE: 21
ADLS_ACUITY_SCORE: 33
ADLS_ACUITY_SCORE: 21
ADLS_ACUITY_SCORE: 23
ADLS_ACUITY_SCORE: 21
ADLS_ACUITY_SCORE: 27
ADLS_ACUITY_SCORE: 21
ADLS_ACUITY_SCORE: 23
ADLS_ACUITY_SCORE: 27
ADLS_ACUITY_SCORE: 27
ADLS_ACUITY_SCORE: 21

## 2023-09-28 NOTE — PLAN OF CARE
PRIMARY DIAGNOSIS: ACUTE PAIN d/t  L2, L4,L5 COMPRESSION FXs  OUTPATIENT/OBSERVATION GOALS TO BE MET BEFORE DISCHARGE:  1. Pain Status: Improved-controlled with oral pain medications.    2. Return to near baseline physical activity: No    3. Cleared for discharge by consultants (if involved): No    Discharge Planner Nurse   Safe discharge environment identified: No  Barriers to discharge: Yes       Entered by: Tracey Elkins RN 09/28/2023   /58 (BP Location: Right arm)   Pulse 67   Temp 98.6  F (37  C) (Oral)   Resp 18   SpO2 95%    Patient alert & oriented.VSS. Dilaudid & Tylenol administered for pain. Refusing the T-Pump. & Ice pack when offered.  Bgs -289@ Bedtime.Plan of care ongoing.  Please review provider order for any additional goals.   Nurse to notify provider when observation goals have been met and patient is ready for discharge.

## 2023-09-28 NOTE — PLAN OF CARE
PRIMARY DIAGNOSIS: ACUTE PAIN d/t  L2, L4,L5 COMPRESSION FXs  OUTPATIENT/OBSERVATION GOALS TO BE MET BEFORE DISCHARGE:  1. Pain Status: Improved-controlled with oral pain medications.    2. Return to near baseline physical activity: No    3. Cleared for discharge by consultants (if involved): No    Discharge Planner Nurse   Safe discharge environment identified: No  Barriers to discharge: Yes       Entered by: Tracey Elkins RN 09/28/2023   /57 (BP Location: Left arm)   Pulse 65   Temp 98.1  F (36.7  C) (Oral)   Resp 18   SpO2 96%    Patient alert & oriented.VSS. Dilaudid & Tylenol administered for pain. Refusing the T-Pump. & Ice pack when offered.  Bgs -289 & 176.TLSO brace when OOB. .Plan of care ongoing.  Please review provider order for any additional goals.   Nurse to notify provider when observation goals have been met and patient is ready for discharge.

## 2023-09-28 NOTE — PROGRESS NOTES
[facility]    Medicine Progress Note - Hospitalist Service    Date of Admission:  9/26/2023    Assessment & Plan      June MYLES Do is a 82 year old female with past medical history significant for chronic kidney disease, stage 3b/4, type 2 diabetes /mellitus, cognitive impairment, history of CVA, chronic microcytic anemia, and recent admission to [facility] 8/9/2023-8/14/2023 for L2, L4, and L5 compression fractures in the setting of multiple falls/deconditioning. She was seen by NSG and treated non-operatively with TLSO bracing. She presented to [facility] on 9/26/2023 with worsening back pain and was found to have progressive height loss of L2 compression fracture.    Progressive L2 Compression Fracture  Subacute L4, L5 Compression Fractures  Increased Lumbar Back Pain  Recent admission for back pain and found to have L2, L4, and L5 compression fractures. Was wearing TLSO brace as previously prescribed, but with worsening back pain over last few days.   CT lumbar spine obtained sowing progressive height loss at L2 vertebral body with trace retropulsion of interior endplate, but without impingement on spinal canal. No obvious neurologic compromise.   NSG consulted from emergency department, who recommended pain control and continued bracing when out of bed, patient has only been wearing it when she leaves the home.    Family brought in brace.  Does have a history of osteoporosis, started on calcitonin, which may also help with pain.  -NSG consulted, no further recommendations. Could consider vertebroplasty with IR but pt is able to move with assist of 1  -TLSO brace when out of bed  -pain control with scheduled Tylenol and Dilaudid 1mg PO Q3H PRN  -Calcitonin 1 spray intranasal daily  -PT/OT-recommending TCU  -reconsider vertebroplasty if still having significant pain in 1 month    Chronic Kidney Disease, Stage 3b/4: Creatinine 1.5-->1.65; near baseline 1.4-1.9.  "Monitoring. Avoid nephrotoxins.  Poorly Controlled Type 2 Diabetes Mellitus:   A1C 8/2023 = 12.9. Glucose is liable. Admitted in May 2023 with severe hypoglycemia, diabetic regimen significantly adjusted. Discharged on Lantus 9 mg with Jaridiance. Discharged to TCU, discharged home on Lantus 18 units, 3 units Humalog TID with meals and Jardiance.   - on admission, started on 10 units Lantus, increase to 15 units today  - cover with 1:15 CHO + LDSSI.   - hypoglycemia protocol    Cognitive Impairment: Was able to provide history of back pain, but lacked significant detail about injury.  Chronic Microcytic, Hypochromic Anemia: Hgb 9.4, increased from prior.       Diet: Regular Diet Adult    DVT Prophylaxis: Pneumatic Compression Devices  Sotelo Catheter: Not present  Lines: None     Cardiac Monitoring: None  Code Status: Full Code      Clinically Significant Risk Factors Present on Admission                # Drug Induced Platelet Defect: home medication list includes an antiplatelet medication       # DMII: A1C = 12.9 % (Ref range: <5.7 %) within past 6 months   # Cachexia: Estimated body mass index is 16.15 kg/m  as calculated from the following:    Height as of 9/11/23: 1.575 m (5' 2\").    Weight as of 9/11/23: 40.1 kg (88 lb 4.8 oz).              Disposition Plan      Expected Discharge Date: 09/29/2023    Discharge Delays: Placement - TCU            The patient's care was discussed with the Attending Physician, Dr. Cramer, Bedside Nurse, Care Coordinator/, and Patient.    Wendy Ramos PA-C  Hospitalist Service  [facility]  Securely message with [employer] (more info)  Text page via [facility] Paging/Directory   ______________________________________________________________________    Interval History   Pain is manageable at rest. No other complaints. Requires assistance to get out of bed    Physical Exam   Vital Signs: Temp: 98  F (36.7  C) Temp src: Oral BP: 129/64 Pulse: 66   Resp: " 18 SpO2: 100 % O2 Device: None (Room air)    Weight: 0 lbs 0 oz    GENERAL:  Comfortable.  PSYCH: pleasant, oriented, No acute distress.  HEART:  RRR  LUNGS:  Normal Respiratory effort.   EXTREMITIES:  able to ambulate with assistance  SKIN:  Dry to touch, No rash, wound or ulcerations.  NEUROLOGIC:  grossly intact    Medical Decision Making       45 MINUTES SPENT BY ME on the date of service doing chart review, history, exam, documentation & further activities per the note.      Data         Imaging results reviewed over the past 24 hrs:   No results found for this or any previous visit (from the past 24 hour(s)).

## 2023-09-28 NOTE — PROGRESS NOTES
Care Management Discharge Note    Discharge Date: 09/29/2023     Discharge Disposition: [facility]    Discharge Services:  PT/OT    Discharge Transportation: Anticipate family transport    Private pay costs discussed: Not applicable    Does the patient's insurance plan have a 3 day qualifying hospital stay waiver?  No    PAS Confirmation Code: [id number]  Patient/family educated on Medicare website which has current facility and service quality ratings: yes    Education Provided on the Discharge Plan:  Yes  Persons Notified of Discharge Plans: Patient, daughter, [facility] admissions  Patient/Family in Agreement with the Plan:  Yes    Handoff Referral Completed: No    Additional Information:  [facility] has clinically accepted patient, semi-prvt room available. Updated daughter Gloria who is agreeable. [facility] insurance auth. Facility is hopeful for an admission tomorrow at 1530. Left VM for daughter to inquire if family can transport around 3pm tomorrow. Awaiting response. SW will continue to follow.     MAYA Lopez, [facility]   Inpatient Care Coordination  [facility]   [phone]

## 2023-09-28 NOTE — CONSULTS
Care Management Initial Consult    General Information  Assessment completed with: Patient, Children, Patient, daughter Gloria  Type of CM/SW Visit: Initial Assessment    Primary Care Provider verified and updated as needed: Yes   Readmission within the last 30 days:           Advance Care Planning: Advance Care Planning Reviewed: present on chart        Communication Assessment  Patient's communication style: spoken language (English or Bilingual)    Hearing Difficulty or Deaf: no   Wear Glasses or Blind: no    Cognitive  Cognitive/Neuro/Behavioral: WDL                      Living Environment:   People in home: child(thalia), adult     Current living Arrangements: apartment      Able to return to prior arrangements: yes    Family/Social Support:  Care provided by: self  Provides care for: no one      Description of Support System: Supportive, Involved    Support Assessment: Adequate family and caregiver support, Adequate social supports    Current Resources:   Patient receiving home care services: Yes  Skilled Home Care Services: Physical Therapy, Skilled Nursing, Occupational Therapy  Community Resources:    Equipment currently used at home: cane, straight  Supplies currently used at home:      Employment/Financial:  Employment Status: Retired      Financial Concerns: insurance, none   Referral to Financial Worker: No     Does the patient's insurance plan have a 3 day qualifying hospital stay waiver?  No    Lifestyle & Psychosocial Needs:  Social Determinants of Health     Food Insecurity: Not on file   Depression: Not at risk (9/11/2023)    PHQ-2     PHQ-2 Score: 0   Housing Stability: Not on file   Tobacco Use: Low Risk  (5/13/2023)    Patient History     Smoking Tobacco Use: Never     Smokeless Tobacco Use: Never     Passive Exposure: Not on file   Financial Resource Strain: Not on file   Alcohol Use: Not on file   Transportation Needs: Not on file   Physical Activity: Not on file   Interpersonal Safety: Not on  file   Stress: Not on file   Social Connections: Not on file     Functional Status:  Prior to admission patient needed assistance: Patient admitted under observation status for L2 compression fracture.     SW met with patient briefly, pt requested SW call daughter. SW spoke with patient's daughter Gloria via phone ([phone]). Patient reports that pt and daughter live together in an apartment. Daughter works outside of the home. Patient ambulates with a walker or cane at home. Dtr assists with showering and IADLs as needed.     Patient was recently at [facility]CU from 8/14-9/8. Patient was then discharged home with [facility]. Confirmed with intake ([phone]) that patient was open to RN/PT/OT services.     PT evaluated patient and recommend TCU. Pt and family agreeable. Dtr requesting referrals be sent near [place]. Agreeable to a semi-prvt room.     Reviewed VANEGAS letter with daughter via phone. Copy left at bedside.      Mental Health Status:  Mental Health Status: No Current Concerns       Chemical Dependency Status:  Chemical Dependency Status: No Current Concerns           Values/Beliefs:  Spiritual, Cultural Beliefs, Holiness Practices, Values that affect care: yes             Additional Information:  Plan: TCU, referrals sent, semi-prvt room. Daughter anticipates family can transport pending pt's mobility. SW will continue to follow.     MAYA Lopez, [facility]   Inpatient Care Coordination  [facility]   [phone]

## 2023-09-28 NOTE — UTILIZATION REVIEW
Concurrent stay review; Secondary Review Determination    [facility]        Under the authority of the Utilization Management Committee, the utilization review process indicated a secondary review on the above patient.  The review outcome is based on review of the medical records, discussions with staff, and applying clinical experience noted on the date of the review.        (x) Observation/outpatient Status Appropriate - Concurrent stay review       RATIONALE FOR DETERMINATION:          The patient  is a 82 year old female with past medical history significant for chronic kidney disease, stage 3b/4, type 2 diabetes /mellitus, cognitive impairment, history of CVA, chronic microcytic anemia, and recent admission in the [facility] 8/9/2023-8/14/2023 for L2, L4, and L5 compression fractures in the setting of multiple falls/deconditioning. She was seen by NSG and treated non-operatively with TLSO bracing. She presented to [facility] on 9/26/2023 with worsening back pain and was found to have progressively height loss of L2 compression fracture.   . NSG consulted from emergency department, who recommended pain control and continued bracing when out of bed. No obvious neurologic compromise.  The patient has been controlled with acetaminophen and as needed oral Dilaudid.  PT/OT-recommending TCU .   found a TCU placement for tomorrow.  Family in agreement.        Patient delayed discharge is related to disposition, there is no medical necessity for inpatient admission at the time of this review. If there is a change in patient status, please resend for review.    The information on this document is developed by the utilization review team in order for the business office to ensure compliance.  This only denotes the appropriateness of proper admission status and does not reflect the quality of care rendered.       The definitions of Inpatient Status and Observation Status used  in making the determination above are those provided in the CMS Coverage Manual, Chapter 1 and Chapter 6, section 70.4.       Sincerely,     LINCOLN CARO MD   Utilization Review  Physician Advisor  [facility]

## 2023-09-28 NOTE — PLAN OF CARE
PRIMARY DIAGNOSIS: ACUTE PAIN d/t  L2, L4,L5 COMPRESSION FXs  OUTPATIENT/OBSERVATION GOALS TO BE MET BEFORE DISCHARGE:  1. Pain Status: Improved-controlled with oral pain medications.    2. Return to near baseline physical activity: No    3. Cleared for discharge by consultants (if involved): No    Discharge Planner Nurse   Safe discharge environment identified: No  Barriers to discharge: Yes       Entered by: Tracey Elkins RN 09/27/2023   /55 (BP Location: Left arm)   Pulse 73   Temp 97.8  F (36.6  C) (Oral)   Resp 16   SpO2 97%       Please review provider order for any additional goals.   Nurse to notify provider when observation goals have been met and patient is ready for discharge.

## 2023-09-28 NOTE — PLAN OF CARE
PRIMARY DIAGNOSIS: ACUTE PAIN  OUTPATIENT/OBSERVATION GOALS TO BE MET BEFORE DISCHARGE:  1. Pain Status: Improved-controlled with oral pain medications.    2. Return to near baseline physical activity: No    3. Cleared for discharge by consultants (if involved): No    Discharge Planner Nurse   Safe discharge environment identified: No  Barriers to discharge: Yes       Entered by: Cheryl Tsai RN 09/28/2023      Please review provider order for any additional goals.   Nurse to notify provider when observation goals have been met and patient is ready for discharge.    Pt A&O. IV SL. Lao speaking, using ipad . Assist x1 with gait bed, walker &TLSO brace. Regular diet, fair appetite. Tolerates PO medication well, takes pills one at a time. Pt resting in bed, call light within reach. Able to make needs known.

## 2023-09-28 NOTE — PLAN OF CARE
PRIMARY DIAGNOSIS: ACUTE PAIN  OUTPATIENT/OBSERVATION GOALS TO BE MET BEFORE DISCHARGE:  1. Pain Status: Improved-controlled with oral pain medications.    2. Return to near baseline physical activity: No    3. Cleared for discharge by consultants (if involved): No    Discharge Planner Nurse   Safe discharge environment identified: No  Barriers to discharge: Yes       Entered by: Cheryl Tsai RN 09/28/2023 12:54 PM    Please review provider order for any additional goals.   Nurse to notify provider when observation goals have been met and patient is ready for discharge.    Pt A&O. IV SL. VSS, on RA. Turkmen speaking, using ipad . Assist x1 with gait bed, walker &TLSO brace. Regular diet, fair appetite. Tolerates PO medication well, takes pills one at a time. Pt resting in bed, call light within reach. Able to make needs known. Plan for TCU, referrals sent per  note.

## 2023-09-28 NOTE — PLAN OF CARE
PRIMARY DIAGNOSIS: ACUTE PAIN  OUTPATIENT/OBSERVATION GOALS TO BE MET BEFORE DISCHARGE:  1. Pain Status: denies pain     2. Return to near baseline physical activity: No    3. Cleared for discharge by consultants (if involved): No    Discharge Planner Nurse   Safe discharge environment identified: No  Barriers to discharge: Yes       Entered by: Cheryl Tsai RN 09/28/2023 3:40 PM    Please review provider order for any additional goals.   Nurse to notify provider when observation goals have been met and patient is ready for discharge.    Pt A&O. IV SL. VSS, on RA. St Helen[place] speaking, using ipad . Assist x1 with gait bed, walker &TLSO brace. Regular diet, fair appetite. Tolerates PO medication well, takes pills one at a time. Pt resting in bed, call light within reach. Able to make needs known. Plan for [facility] [place] 9/28 at 3 pm pending insurance authorization per  note.

## 2023-09-29 ENCOUNTER — APPOINTMENT (OUTPATIENT)
Dept: INTERPRETER SERVICES | Facility: CLINIC | Age: 83
End: 2023-09-29
Payer: COMMERCIAL

## 2023-09-29 VITALS
HEART RATE: 67 BPM | OXYGEN SATURATION: 96 % | TEMPERATURE: 98.1 F | DIASTOLIC BLOOD PRESSURE: 49 MMHG | SYSTOLIC BLOOD PRESSURE: 134 MMHG | RESPIRATION RATE: 18 BRPM

## 2023-09-29 LAB
GLUCOSE BLDC GLUCOMTR-MCNC: 115 MG/DL (ref 70–99)
GLUCOSE BLDC GLUCOMTR-MCNC: 237 MG/DL (ref 70–99)
GLUCOSE BLDC GLUCOMTR-MCNC: 238 MG/DL (ref 70–99)

## 2023-09-29 PROCEDURE — G0378 HOSPITAL OBSERVATION PER HR: HCPCS

## 2023-09-29 PROCEDURE — 250N000013 HC RX MED GY IP 250 OP 250 PS 637: Performed by: STUDENT IN AN ORGANIZED HEALTH CARE EDUCATION/TRAINING PROGRAM

## 2023-09-29 PROCEDURE — 82962 GLUCOSE BLOOD TEST: CPT

## 2023-09-29 PROCEDURE — 250N000013 HC RX MED GY IP 250 OP 250 PS 637: Performed by: PHYSICIAN ASSISTANT

## 2023-09-29 PROCEDURE — 99238 HOSP IP/OBS DSCHRG MGMT 30/<: CPT | Performed by: PHYSICIAN ASSISTANT

## 2023-09-29 RX ORDER — INSULIN LISPRO 100 [IU]/ML
1-5 INJECTION, SOLUTION INTRAVENOUS; SUBCUTANEOUS
Qty: 15 ML | DISCHARGE
Start: 2023-09-29 | End: 2023-09-29

## 2023-09-29 RX ORDER — POLYETHYLENE GLYCOL 3350 17 G/17G
17 POWDER, FOR SOLUTION ORAL DAILY
Qty: 510 G | DISCHARGE
Start: 2023-09-29

## 2023-09-29 RX ORDER — INSULIN LISPRO 100 [IU]/ML
1-5 INJECTION, SOLUTION INTRAVENOUS; SUBCUTANEOUS AT BEDTIME
Qty: 15 ML | DISCHARGE
Start: 2023-09-29

## 2023-09-29 RX ORDER — INSULIN LISPRO 100 [IU]/ML
1-3 INJECTION, SOLUTION INTRAVENOUS; SUBCUTANEOUS
Qty: 15 ML | DISCHARGE
Start: 2023-09-29 | End: 2023-09-29

## 2023-09-29 RX ORDER — OXYCODONE HYDROCHLORIDE 5 MG/1
2.5 TABLET ORAL EVERY 6 HOURS PRN
Qty: 10 TABLET | Refills: 0 | Status: SHIPPED | OUTPATIENT
Start: 2023-09-29

## 2023-09-29 RX ORDER — INSULIN LISPRO 100 [IU]/ML
1-7 INJECTION, SOLUTION INTRAVENOUS; SUBCUTANEOUS
Qty: 15 ML | DISCHARGE
Start: 2023-09-29

## 2023-09-29 RX ORDER — INSULIN LISPRO 100 [IU]/ML
1-3 INJECTION, SOLUTION INTRAVENOUS; SUBCUTANEOUS AT BEDTIME
Qty: 15 ML | DISCHARGE
Start: 2023-09-29 | End: 2023-09-29

## 2023-09-29 RX ADMIN — ACETAMINOPHEN 975 MG: 325 TABLET, FILM COATED ORAL at 01:20

## 2023-09-29 RX ADMIN — ACETAMINOPHEN 975 MG: 325 TABLET, FILM COATED ORAL at 08:21

## 2023-09-29 RX ADMIN — EMPAGLIFLOZIN 10 MG: 10 TABLET, FILM COATED ORAL at 08:22

## 2023-09-29 RX ADMIN — Medication 50 MCG: at 08:21

## 2023-09-29 RX ADMIN — FERROUS GLUCONATE 324 MG: 324 TABLET ORAL at 08:22

## 2023-09-29 RX ADMIN — DOCUSATE SODIUM 100 MG: 100 CAPSULE, LIQUID FILLED ORAL at 08:22

## 2023-09-29 RX ADMIN — POLYETHYLENE GLYCOL 3350 17 G: 17 POWDER, FOR SOLUTION ORAL at 09:42

## 2023-09-29 RX ADMIN — CALCITONIN SALMON 1 SPRAY: 200 SPRAY, METERED NASAL at 09:41

## 2023-09-29 RX ADMIN — BRIMONIDINE TARTRATE AND TIMOLOL MALEATE 1 DROP: 2; 5 SOLUTION/ DROPS OPHTHALMIC at 08:24

## 2023-09-29 RX ADMIN — INSULIN ASPART 1 UNITS: 100 INJECTION, SOLUTION INTRAVENOUS; SUBCUTANEOUS at 12:48

## 2023-09-29 RX ADMIN — SENNOSIDES AND DOCUSATE SODIUM 1 TABLET: 8.6; 5 TABLET ORAL at 08:22

## 2023-09-29 ASSESSMENT — ACTIVITIES OF DAILY LIVING (ADL)
ADLS_ACUITY_SCORE: 23

## 2023-09-29 NOTE — PLAN OF CARE
PRIMARY DIAGNOSIS: ACUTE BACK PAIN  OUTPATIENT/OBSERVATION GOALS TO BE MET BEFORE DISCHARGE:  1. Pain Status: Improved-controlled with oral pain medications.    2. Return to near baseline physical activity: Yes    3. Cleared for discharge by consultants (if involved): No    Discharge Planner Nurse   Safe discharge environment identified: Yes  Barriers to discharge: Yes      Romanian speaking, small amount of english words spoken/understood. TLSO brace when OOB. Pain reported, scheduled Tylenol given. Will discharge to [facility] this afternoon.

## 2023-09-29 NOTE — DISCHARGE SUMMARY
"[facility]  Hospitalist Discharge Summary      Date of Admission:  9/26/2023  Date of Discharge:  9/29/2023  Discharging Provider: Linda Wen PA-C  Discharge Service: Hospitalist Service    Discharge Diagnoses   Acute worsening of back pain due to advancing L2 compression fracture    Clinically Significant Risk Factors     # DMII: A1C = 12.9 % (Ref range: <5.7 %) within past 6 months  # Cachexia: Estimated body mass index is 16.15 kg/m  as calculated from the following:    Height as of 9/11/23: 1.575 m (5' 2\").    Weight as of 9/11/23: 40.1 kg (88 lb 4.8 oz).       Follow-ups Needed After Discharge   Follow-up Appointments     Follow Up and recommended labs and tests      Follow up with Nursing home physician.  No follow up labs or test are   needed.    Follow up with Neurosurgery at her regularly scheduled appointment on   11/06/2023 with updated imaging.            Unresulted Labs Ordered in the Past 30 Days of this Admission       No orders found from 8/27/2023 to 9/27/2023.            Discharge Disposition   Discharged to rehabilitation facility  Condition at discharge: Stable    Hospital Course   June MYLES Do is a 82 year old female with past medical history significant for chronic kidney disease, stage 3b/4, type 2 diabetes /mellitus, cognitive impairment, history of CVA, chronic microcytic anemia, and recent admission to [facility] 8/9/2023-8/14/2023 for L2, L4, and L5 compression fractures in the setting of multiple falls/deconditioning. She was seen by NSG and treated non-operatively with TLSO bracing. She presented to [facility] on 9/26/2023 with worsening back pain.     \"Patient reportedly had been admitted to [facility] 8/9/2023-8/14/2023 for back pain and was found to have L2/L4/L5 vertebral compression fractures. She was provided with a TLSO and ultimately discharged to a TCU. She had been doing well until a couple of days ago when patient " "reportedly bent to pick something up from the ground and had acute worsening of her lower back pain. Reportedly, patient had only been wearing her TLSO brace when out of the house and patient had not been wearing it at the time of repeat injury\"    In the emergency department, she was found to be afebrile. Pulse was 77-84. She was hypertensive to 162-183/73-83. She had normal oxygen saturations on room air.  Laboratory studies were notable for BUN 31.5, creatinine 1.5, glucose 149, hemoglobin 9.4, hematocrit 31.0.  CT lumbar spine without contrast revealed progressive height loss of the L2 vertebral body, now with approximately 50% height loss centrally.  There is also trace retropulsion of the inferior endplate but without significant spinal canal compromise.  The compression fractures at L4 and L5 endplates were similar in appearance to prior.  CT of the patient's pelvis without acute findings.  She was provided with pain control.      Neurosurgery was consulted who recommends conservative management with TLSO brace and pain control.  She was seen by PT who recommends TCU and social work facilitated finding one.           Consultations This Hospital Stay   NEUROSURGERY IP CONSULT  PHYSICAL THERAPY ADULT IP CONSULT  CARE MANAGEMENT / SOCIAL WORK IP CONSULT  PHYSICAL THERAPY ADULT IP CONSULT  OCCUPATIONAL THERAPY ADULT IP CONSULT    Code Status   Full Code    Time Spent on this Encounter   I, Linda Wen PA-C, personally saw the patient today and spent greater than 30 minutes discharging this patient.       Linda Wen PA-C  [facility] OBSERVATION DEPT  [address] [facility] [address]  Phone: [phone]  ______________________________________________________________________    Physical Exam   Vital Signs: Temp: 98.1  F (36.7  C) Temp src: Oral BP: 134/49 Pulse: 67   Resp: 18 SpO2: 96 % O2 Device: None (Room air)    Weight: 0 lbs 0 oz  General Appearance: Frail, " alert and oriented.  iPad  was used  Respiratory: Clear to auscultation bilaterally  Cardiovascular: RRR without murmur  GI: Bowel sounds are present without tenderness  Skin: No rashes or open sores are noted         Primary Care Physician   Cordelia Clark    Discharge Orders      General info for SNF    Length of Stay Estimate: Short Term Care: Estimated # of Days <30  Condition at Discharge: Stable  Level of care:skilled   Rehabilitation Potential: Good  Admission H&P remains valid and up-to-date: Yes  Recent Chemotherapy: N/A  Use Nursing Home Standing Orders: Yes     Mantoux instructions    Give two-step Mantoux (PPD) Per Facility Policy Yes     Follow Up and recommended labs and tests    Follow up with Nursing home physician.  No follow up labs or test are needed.    Follow up with Neurosurgery at her regularly scheduled appointment on 11/06/2023 with updated imaging.     Reason for your hospital stay    You were admitted for increased back pain related to worsening of a L2 compression fracture.  You were seen by neurosurgery who recommended wearing your brace anytime you are out of bed.  You will continue to have pain due to this fracture and should use pain medicines as needed.    We are discharging you with a sliding scale of insulin which may be adjusted at rehab.     Glucose monitor nursing POCT    Before meals and at bedtime     Activity - Up with assistive device     Activity - Up with nursing assistance     Additional Discharge Instructions    We will continue to have her wear her TLSO when out of bed.     We do suggest that she not lift anything greater than 5-10 pounds and avoid excessive bending, twisting, and turning.     Full Code     Physical Therapy Adult Consult    Evaluate and treat as clinically indicated.    Reason:  Back pain due to compression fracture     Occupational Therapy Adult Consult    Evaluate and treat as clinically indicated.    Reason:  Back pain due to compression  fracture     Fall precautions     Diet    Follow this diet upon discharge: Regular       Significant Results and Procedures   Most Recent 3 CBC's:  Recent Labs   Lab Test 09/27/23  0822 09/26/23  1736 08/12/23  0613   WBC 6.5 9.1 5.7   HGB 9.2* 9.4* 8.0*   MCV 66* 65* 63*    242 283     Most Recent 3 BMP's:  Recent Labs   Lab Test 09/29/23  1151 09/29/23  0840 09/29/23  0223 09/27/23  0906 09/27/23  0822 09/26/23  2104 09/26/23  1736 09/26/23  1718 09/07/23  0912   NA  --   --   --   --  139  --  137  --  139   POTASSIUM  --   --   --   --  3.8  --  3.6  --  4.7   CHLORIDE  --   --   --   --  103  --  100  --  104   CO2  --   --   --   --  22  --  25  --  23   BUN  --   --   --   --  34.1*  --  31.5*  --  46.0*   CR  --   --   --   --  1.65*  --  1.50*  --  2.08*   ANIONGAP  --   --   --   --  14  --  12  --  12   MANUEL  --   --   --   --  8.9  --  9.1  --  8.9   * 238* 115*   < > 172*   < > 149*   < > 184*    < > = values in this interval not displayed.   ,   Results for orders placed or performed during the hospital encounter of 09/26/23   Lumbar spine CT w/o contrast    Narrative    EXAM: CT LUMBAR SPINE W/O CONTRAST  LOCATION: [facility]  DATE: 9/26/2023    INDICATION: lower back pain  COMPARISON: Lumbar spine radiographs: 09/11/2023; lumbar spine MRI: 08/09/2023.  TECHNIQUE: Routine CT Lumbar Spine without IV contrast. Multiplanar reformats. Dose reduction techniques were used.     FINDINGS:  VERTEBRA: Progressive height loss associated with known recent compression fracture of the L2 vertebral body, which now measures approximately 50% centrally. Trace bony retropulsion involving the L2 inferior endplate without substantial spinal canal   compromise. No progressive height loss associated with mild compressive deformities involving the L4 inferior endplate and L5 superior endplate. No evidence of new acute fracture. Osteopenia.    CANAL/FORAMINA: Overall mild multilevel  degenerative disc disease throughout the lumbar spine without high-grade spinal canal stenosis. Generally mild facet arthropathy throughout the lumbar spine. No high-grade neural foraminal stenosis. Neural   foraminal stenosis is most pronounced and moderate bilaterally at L4-L5.    PARASPINAL: Mild prevertebral edema along the anterior margin of the L2 vertebral body. Calcified atherosclerosis of the abdominal aorta and branch vessels without aneurysm as imaged.      Impression    IMPRESSION:    1.  Progressive height loss associated with known recent compression fracture of the L2 vertebral body, now with approximately 50% height loss centrally. There is trace bony retropulsion involving the L2 inferior endplate without substantial spinal canal   compromise.  2.  Similar mild compressive deformities of the L4 inferior endplate and L5 superior endplate.  3.  No evidence of new acute fracture involving the lumbar spine.  4.  No high-grade spinal canal or neural foraminal stenosis. Neural foraminal stenosis is greatest and moderate bilaterally at L4-L5 as seen on recent MRI.   CT Pelvis Bone wo Contrast    Narrative    EXAM: CT PELVIS BONE WO CONTRAST  LOCATION: [facility]  DATE: 9/26/2023    INDICATION: 82-year-old patient with pelvic and low back pain.  COMPARISON: 08/09/2023 lumbar spine MRI.  TECHNIQUE: CT scan of the pelvis was performed without IV contrast. Multiplanar reformats were obtained. Dose reduction techniques were used.  CONTRAST: None.    FINDINGS:    BONES AND JOINTS:  -No pelvic or proximal femur fracture.   -Mild L5 superior endplate depression.  -Mild bilateral sacroiliac and hip degenerative arthrosis.    MUSCLES AND SOFT TISSUES:   -No asymmetric muscle enlargement or atrophy.   -No soft tissue fluid collection.    OTHER:   -No free fluid in the pelvis.      Impression    IMPRESSION:  1.  No pelvic or proximal femur fracture.  2.  Depression of the L5 superior endplate,  similar since the 08/09/2023 MRI.  3.  No acute soft tissue or myotendinous pathology is evident.       Discharge Medications   Current Discharge Medication List        START taking these medications    Details   polyethylene glycol (MIRALAX) 17 GM/Dose powder Take 17 g by mouth daily  Qty: 510 g    Associated Diagnoses: Closed compression fracture of L2 lumbar vertebra, initial encounter (H)           CONTINUE these medications which have CHANGED    Details   !! insulin lispro (HUMALOG KWIKPEN) 100 UNIT/ML (1 unit dial) KWIKPEN Inject 1-7 Units Subcutaneous 3 times daily (before meals) Correction Scale - MEDIUM INSULIN RESISTANCE DOSING     Do Not give Correction Insulin if Pre-Meal BG less than 140.   For Pre-Meal  - 189 give 1 unit.   For Pre-Meal  - 239 give 2 units.   For Pre-Meal  - 289 give 3 units.   For Pre-Meal  - 339 give 4 units.   For Pre-Meal - 399 give 5 units.   For Pre-Meal -449 give 6 units  For Pre-Meal BG greater than or equal to 450 give 7 units.   To be given with prandial insulin, and based on pre-meal blood glucose.    Notify provider if glucose greater than or equal to 350 mg/dL after administration of correction dose.  If given at mealtime, administer within 30 minutes of start of meal.  Qty: 15 mL    Associated Diagnoses: Type 2 diabetes mellitus with other specified complication, with long-term current use of insulin (H)      !! insulin lispro (HUMALOG KWIKPEN) 100 UNIT/ML (1 unit dial) KWIKPEN Inject 1-5 Units Subcutaneous At Bedtime MEDIUM INSULIN RESISTANCE DOSING    Do Not give Bedtime Correction Insulin if BG less than  200.   For  - 249 give 1 units.   For  - 299 give 2 units.   For  - 349 give 3 units.   For  -399 give 4 units.   For BG greater than or equal to 400 give 5 units.  Notify provider if glucose greater than or equal to 350 mg/dL after administration of correction dose.  If given at mealtime, administer within  30 minutes of start of meal.  Qty: 15 mL    Associated Diagnoses: Type 2 diabetes mellitus with other specified complication, with long-term current use of insulin (H)      oxyCODONE (ROXICODONE) 5 MG tablet Take 0.5 tablets (2.5 mg) by mouth every 6 hours as needed for moderate pain  Qty: 10 tablet, Refills: 0    Associated Diagnoses: Compression fracture of L5 lumbar vertebra, closed, initial encounter (H)       !! - Potential duplicate medications found. Please discuss with provider.        CONTINUE these medications which have NOT CHANGED    Details   acetaminophen (TYLENOL) 325 MG tablet Take 3 tablets (975 mg) by mouth every 8 hours    Associated Diagnoses: Compression fracture of L5 lumbar vertebra, closed, initial encounter (H)      aspirin (ASA) 81 MG chewable tablet Take 81 mg by mouth daily      brimonidine-timolol (COMBIGAN) 0.2-0.5 % ophthalmic solution Place 1 drop into both eyes 2 times daily      docusate sodium (COLACE) 100 MG capsule Take 100 mg by mouth daily      empagliflozin (JARDIANCE) 10 MG TABS tablet Take 10 mg by mouth daily      Ferrous Gluconate 324 (37.5 Fe) MG TABS Take 324 mg by mouth 2 times daily      insulin glargine (LANTUS PEN) 100 UNIT/ML pen Inject 18 Units Subcutaneous every morning      levocetirizine (XYZAL) 5 MG tablet Take 5 mg by mouth every evening      pravastatin (PRAVACHOL) 20 MG tablet Take 1 tablet (20 mg) by mouth At Bedtime    Associated Diagnoses: Compression fracture of L5 lumbar vertebra, closed, initial encounter (H)      senna-docusate (SENOKOT-S/PERICOLACE) 8.6-50 MG tablet Take 1 tablet by mouth daily    Associated Diagnoses: Compression fracture of L5 lumbar vertebra, closed, initial encounter (H)      vitamin D3 (CHOLECALCIFEROL) 50 mcg (2000 units) tablet Take 1 tablet by mouth daily      darbepoetin claudette (ARANESP, ALBUMIN FREE,) 60 MCG/0.3ML injection Inject 60 mcg Subcutaneous every 14 days           Allergies   No Known Allergies

## 2023-09-29 NOTE — PLAN OF CARE
PRIMARY DIAGNOSIS: ACUTE BACK PAIN  OUTPATIENT/OBSERVATION GOALS TO BE MET BEFORE DISCHARGE:  1. Pain Status: Improved-controlled with oral pain medications.    2. Return to near baseline physical activity: Yes    3. Cleared for discharge by consultants (if involved): No    Discharge Planner Nurse   Safe discharge environment identified: Yes  Barriers to discharge: Yes      Occitan speaking, small amount of english words spoken/understood. TLSO brace when OOB. Pain reported, scheduled Tylenol given. Will discharge to [facility] this afternoon.

## 2023-09-29 NOTE — PLAN OF CARE
Physical Therapy Discharge Summary    Reason for therapy discharge:    Discharged to transitional care facility.    Progress towards therapy goal(s). See goals on Care Plan in [facility] electronic health record for goal details.  Goals partially met.  Barriers to achieving goals:   limited tolerance for therapy and discharge from facility.    Therapy recommendation(s):    Continued therapy is recommended.  Rationale/Recommendations:  TCU to maximize return to PLOF.  **Not seen by discharging therapist; information based on medical record from prior treating therapist

## 2023-09-29 NOTE — PROGRESS NOTES
Patient's After Visit Summary was sent with EMS.   Patient verbalized understanding of After Visit Summary, recommended follow up and was given an opportunity to ask questions.   Discharge medications sent home with patient/family: Not applicable   Discharged with other: [facility] transport.     /49 (BP Location: Left arm)   Pulse 67   Temp 98.1  F (36.7  C) (Oral)   Resp 18   SpO2 96%

## 2023-09-29 NOTE — PROGRESS NOTES
Care Management Discharge Note    Discharge Date: 09/29/2023     Discharge Disposition: [facility]    Discharge Services:  PT/OT    Discharge Transportation: Reviewed out of pocket cost for [facility] transport, $89.98 base and $5.79 per mile to the destination. Spoke with daughter, they expressed understanding and are agreeable to this.     Private pay costs discussed: transportation costs    Does the patient's insurance plan have a 3 day qualifying hospital stay waiver?  No    PAS Confirmation Code: [id number]  Patient/family educated on Medicare website which has current facility and service quality ratings: yes    Education Provided on the Discharge Plan:  Yes  Persons Notified of Discharge Plans: Patient, daughter Gloria   Patient/Family in Agreement with the Plan:  Yes    Handoff Referral Completed: No    Additional Information:  SW received call from [facility] admissions. They have obtained insurance auth and can admit today. SW spoke with daughter Gloria, she is requesting [facility] transport be arranged today. [facility] transport arranged for today, 9/29, between [id number]. Facility and family updated. PAS completed. Updated [facility] ([phone]) that pt will be discharging to TCU. Orders to be faxed. SW will continue to follow.     MAYA Lopez, [facility]   Inpatient Care Coordination  [facility]   [phone]

## 2023-09-29 NOTE — PLAN OF CARE
PRIMARY DIAGNOSIS: ACUTE PAIN d/t  L2, L4,L5 COMPRESSION FXs  OUTPATIENT/OBSERVATION GOALS TO BE MET BEFORE DISCHARGE:  1. Pain Status: Improved-controlled with oral pain medications.    2. Return to near baseline physical activity: No    3. Cleared for discharge by consultants (if involved): No    Discharge Planner Nurse   Safe discharge environment identified: No  Barriers to discharge: Yes       Entered by: Tracey Elkins RN 09/29/2023   BP (!) 140/60 (BP Location: Right arm)   Pulse 69   Temp 98  F (36.7  C) (Oral)   Resp 18   SpO2 96%    Patient alert & oriented with some confusion.VSS. Dilaudid & Tylenol administered for pain. Refusing the T-Pump. & Ice pack when offered.  Bgs -225 & 115.TLSO brace when OOB.Anticipated discharge to [facility] 9/29 @ 3 pm. PT, SW following.  Please review provider order for any additional goals.   Nurse to notify provider when observation goals have been met and patient is ready for discharge.

## 2023-09-29 NOTE — PLAN OF CARE
PRIMARY DIAGNOSIS: ACUTE PAIN d/t  L2, L4,L5 COMPRESSION FXs  OUTPATIENT/OBSERVATION GOALS TO BE MET BEFORE DISCHARGE:  1. Pain Status: Improved-controlled with oral pain medications.    2. Return to near baseline physical activity: No    3. Cleared for discharge by consultants (if involved): No    Discharge Planner Nurse   Safe discharge environment identified: No  Barriers to discharge: Yes       Entered by: Tracey Elkins RN 09/28/2023   BP (!) 145/55 (BP Location: Right arm)   Pulse 60   Temp 98  F (36.7  C) (Oral)   Resp 18   SpO2 98%       Please review provider order for any additional goals.   Nurse to notify provider when observation goals have been met and patient is ready for discharge.

## 2023-09-30 ENCOUNTER — PATIENT OUTREACH (OUTPATIENT)
Dept: CARE COORDINATION | Facility: CLINIC | Age: 83
End: 2023-09-30
Payer: COMMERCIAL

## 2023-09-30 NOTE — PROGRESS NOTES
[facility]    Background: Transitional Care Management program identified per system criteria and reviewed by [facility] team for possible outreach.    Assessment: Upon chart review, CCRC Team member will not proceed with patient outreach related to this episode of Transitional Care Management program due to reason below:    Non-[facility] TCU: CCRC team member noted patient discharged to TCU/ARU/LTACH. Patient is not established with a [facility] currently supported by Primary Care-Care Coordination therefore handoff to Primary Care-Care Coordination is not appropriate at this time.    Plan: Transitional Care Management episode addressed appropriately per reason noted above.      Della Vallecillo MA  Connected Care Resource [place], [facility]    *Connected Care Resource Team does NOT follow patient ongoing. Referrals are identified based on internal discharge reports and the outreach is to ensure patient has an understanding of their discharge instructions.

## 2024-02-09 ENCOUNTER — HOSPITAL ENCOUNTER (EMERGENCY)
Facility: CLINIC | Age: 84
Discharge: HOME | End: 2024-02-09
Attending: EMERGENCY MEDICINE | Admitting: EMERGENCY MEDICINE
Payer: COMMERCIAL

## 2024-02-09 VITALS
TEMPERATURE: 96 F | HEART RATE: 66 BPM | RESPIRATION RATE: 18 BRPM | DIASTOLIC BLOOD PRESSURE: 76 MMHG | SYSTOLIC BLOOD PRESSURE: 150 MMHG | OXYGEN SATURATION: 97 %

## 2024-02-09 DIAGNOSIS — E16.2 HYPOGLYCEMIA: ICD-10-CM

## 2024-02-09 LAB
ANION GAP SERPL CALCULATED.3IONS-SCNC: 12 MMOL/L (ref 7–15)
ATRIAL RATE - MUSE: 86 BPM
BASOPHILS # BLD AUTO: ABNORMAL 10*3/UL
BASOPHILS # BLD MANUAL: 0 10E3/UL (ref 0–0.2)
BASOPHILS NFR BLD AUTO: ABNORMAL %
BASOPHILS NFR BLD MANUAL: 0 %
BUN SERPL-MCNC: 28.8 MG/DL (ref 8–23)
CALCIUM SERPL-MCNC: 9.3 MG/DL (ref 8.8–10.2)
CHLORIDE SERPL-SCNC: 106 MMOL/L (ref 98–107)
CREAT SERPL-MCNC: 1.38 MG/DL (ref 0.51–0.95)
DEPRECATED HCO3 PLAS-SCNC: 22 MMOL/L (ref 22–29)
DIASTOLIC BLOOD PRESSURE - MUSE: NORMAL MMHG
EGFRCR SERPLBLD CKD-EPI 2021: 38 ML/MIN/1.73M2
EOSINOPHIL # BLD AUTO: ABNORMAL 10*3/UL
EOSINOPHIL # BLD MANUAL: 0.3 10E3/UL (ref 0–0.7)
EOSINOPHIL NFR BLD AUTO: ABNORMAL %
EOSINOPHIL NFR BLD MANUAL: 3 %
ERYTHROCYTE [DISTWIDTH] IN BLOOD BY AUTOMATED COUNT: 16.1 % (ref 10–15)
GLUCOSE BLDC GLUCOMTR-MCNC: 172 MG/DL (ref 70–99)
GLUCOSE BLDC GLUCOMTR-MCNC: 173 MG/DL (ref 70–99)
GLUCOSE SERPL-MCNC: 130 MG/DL (ref 70–99)
HCT VFR BLD AUTO: 31.9 % (ref 35–47)
HGB BLD-MCNC: 9.9 G/DL (ref 11.7–15.7)
IMM GRANULOCYTES # BLD: ABNORMAL 10*3/UL
IMM GRANULOCYTES NFR BLD: ABNORMAL %
INTERPRETATION ECG - MUSE: NORMAL
LYMPHOCYTES # BLD AUTO: ABNORMAL 10*3/UL
LYMPHOCYTES # BLD MANUAL: 0.6 10E3/UL (ref 0.8–5.3)
LYMPHOCYTES NFR BLD AUTO: ABNORMAL %
LYMPHOCYTES NFR BLD MANUAL: 7 %
MCH RBC QN AUTO: 19.9 PG (ref 26.5–33)
MCHC RBC AUTO-ENTMCNC: 31 G/DL (ref 31.5–36.5)
MCV RBC AUTO: 64 FL (ref 78–100)
METAMYELOCYTES # BLD MANUAL: 0.1 10E3/UL
METAMYELOCYTES NFR BLD MANUAL: 1 %
MONOCYTES # BLD AUTO: ABNORMAL 10*3/UL
MONOCYTES # BLD MANUAL: 0.3 10E3/UL (ref 0–1.3)
MONOCYTES NFR BLD AUTO: ABNORMAL %
MONOCYTES NFR BLD MANUAL: 3 %
NEUTROPHILS # BLD AUTO: ABNORMAL 10*3/UL
NEUTROPHILS # BLD MANUAL: 7.3 10E3/UL (ref 1.6–8.3)
NEUTROPHILS NFR BLD AUTO: ABNORMAL %
NEUTROPHILS NFR BLD MANUAL: 86 %
NRBC # BLD AUTO: 0 10E3/UL
NRBC BLD AUTO-RTO: 0 /100
P AXIS - MUSE: 80 DEGREES
PLAT MORPH BLD: ABNORMAL
PLATELET # BLD AUTO: 252 10E3/UL (ref 150–450)
POTASSIUM SERPL-SCNC: 3.2 MMOL/L (ref 3.4–5.3)
PR INTERVAL - MUSE: 142 MS
QRS DURATION - MUSE: 78 MS
QT - MUSE: 420 MS
QTC - MUSE: 462 MS
R AXIS - MUSE: -49 DEGREES
RBC # BLD AUTO: 4.97 10E6/UL (ref 3.8–5.2)
RBC MORPH BLD: ABNORMAL
SODIUM SERPL-SCNC: 140 MMOL/L (ref 135–145)
SYSTOLIC BLOOD PRESSURE - MUSE: NORMAL MMHG
T AXIS - MUSE: 68 DEGREES
TARGETS BLD QL SMEAR: SLIGHT
VENTRICULAR RATE- MUSE: 73 BPM
WBC # BLD AUTO: 8.5 10E3/UL (ref 4–11)

## 2024-02-09 PROCEDURE — 93005 ELECTROCARDIOGRAM TRACING: CPT

## 2024-02-09 PROCEDURE — 80048 BASIC METABOLIC PNL TOTAL CA: CPT | Performed by: EMERGENCY MEDICINE

## 2024-02-09 PROCEDURE — 82962 GLUCOSE BLOOD TEST: CPT

## 2024-02-09 PROCEDURE — 85027 COMPLETE CBC AUTOMATED: CPT | Performed by: EMERGENCY MEDICINE

## 2024-02-09 PROCEDURE — 85007 BL SMEAR W/DIFF WBC COUNT: CPT | Performed by: EMERGENCY MEDICINE

## 2024-02-09 PROCEDURE — 99284 EMERGENCY DEPT VISIT MOD MDM: CPT

## 2024-02-09 PROCEDURE — 36415 COLL VENOUS BLD VENIPUNCTURE: CPT | Performed by: EMERGENCY MEDICINE

## 2024-02-09 ASSESSMENT — ACTIVITIES OF DAILY LIVING (ADL)
ADLS_ACUITY_SCORE: 38

## 2024-02-09 NOTE — ED PROVIDER NOTES
History     Chief Complaint:  Hypoglycemia     The history is provided by the patient. The history is limited by a language barrier. A  was used (Italian).      June MYLES Do is a 83 year old female with a history of type 2 diabetes mellitus and hypertension who presents with hypoglycemia and altered mental status this morning. Per EMS, her daughter delivered Novolog this morning. She did not check the patient's blood sugar before delivering insulin. Then daughter then went to work. A little later, she called her mother and got no response. She called for EMS. Upon EMS arrival, she was unresponsive and diaphoretic and her blood glucose was measured at 27. They delivered 2500 of D10, and her blood sugar increased to 400 before coming back down to around 200. Upon my evaluation, the patient reports she feels better than earlier this morning. Denies headache, chest pain, shortness of breath, abdominal pain, diarrhea, and urinary issues. She remembers getting her insulin this morning. She did not eat anything before or after her insulin. She denies recent changes to her insulin dosing. She states she ate normally yesterday.    Independent Historian:   The patient's daughter reports she did not check her sugar this morning. Reports she ate some, but is unsure how much.    Review of External Notes:  Reviewed most recent internal medicine visit from 11/6/23.    Medications:    Aspirin 81 mg   Aranesp  Jardiance  Lantus pen  Humalog  Xyzal  Pravachol    Past Medical History:    CKD stage 3  Hypertension   Glaucoma  Type 2 diabetes mellitus   Vertebral compression fracture     Physical Exam   Patient Vitals for the past 24 hrs:   BP Temp Temp src Pulse Resp SpO2   02/09/24 1256 (!) 188/81 -- -- -- -- 96 %   02/09/24 1253 (!) 188/81 -- -- 69 -- 97 %   02/09/24 1102 -- (!) 96  F (35.6  C) Temporal -- 18 --   02/09/24 1100 -- -- -- -- -- 96 %   02/09/24 1059 (!) 161/96 -- -- 90 -- --        Physical  Exam  Constitutional: Elderly [place] female. Supine. Mildly diaphoretic. HENT: No signs of trauma.   Eyes: EOM: unable to cooperate. Pupils are equal, round, and reactive to light.   Neck: Normal range of motion. No JVD present. No cervical adenopathy.  Cardiovascular: Regular rhythm.  Exam reveals no gallop and no friction rub.    No murmur heard.  Pulmonary/Chest: Bilateral breath sounds normal. No wheezes, rhonchi or rales.  Abdominal: Soft. No tenderness. No rebound or guarding.   Musculoskeletal: No edema. No tenderness.   Lymphadenopathy: No lymphadenopathy.   Neurological: Awake and alert and oriented to self  Skin: Skin is warm and dry. No rash noted. No erythema.     Emergency Department Course   ECG:  ECG results from 02/09/24   EKG 12 lead     Value    Systolic Blood Pressure     Diastolic Blood Pressure     Ventricular Rate 73    Atrial Rate 86    WI Interval 142    QRS Duration 78        QTc 462    P Axis 80    R AXIS -49    T Axis 68    Interpretation ECG      Sinus rhythm with marked sinus arrhythmia  Left axis deviation  Nonspecific ST and T wave abnormality  Read by me 1140 2/9/24       Laboratory:  Labs Ordered and Resulted from Time of ED Arrival to Time of ED Departure   BASIC METABOLIC PANEL - Abnormal       Result Value    Sodium 140      Potassium 3.2 (*)     Chloride 106      Carbon Dioxide (CO2) 22      Anion Gap 12      Urea Nitrogen 28.8 (*)     Creatinine 1.38 (*)     GFR Estimate 38 (*)     Calcium 9.3      Glucose 130 (*)    GLUCOSE BY METER - Abnormal    GLUCOSE BY METER POCT 172 (*)    CBC WITH PLATELETS AND DIFFERENTIAL - Abnormal    WBC Count 8.5      RBC Count 4.97      Hemoglobin 9.9 (*)     Hematocrit 31.9 (*)     MCV 64 (*)     MCH 19.9 (*)     MCHC 31.0 (*)     RDW 16.1 (*)     Platelet Count 252      % Neutrophils        % Lymphocytes        % Monocytes        % Eosinophils        % Basophils        % Immature Granulocytes        NRBCs per 100 WBC 0      Absolute  Neutrophils        Absolute Lymphocytes        Absolute Monocytes        Absolute Eosinophils        Absolute Basophils        Absolute Immature Granulocytes        Absolute NRBCs 0.0     DIFFERENTIAL - Abnormal    % Neutrophils 86      % Lymphocytes 7      % Monocytes 3      % Eosinophils 3      % Basophils 0      % Metamyelocytes 1      Absolute Neutrophils 7.3      Absolute Lymphocytes 0.6 (*)     Absolute Monocytes 0.3      Absolute Eosinophils 0.3      Absolute Basophils 0.0      Absolute Metamyelocytes 0.1 (*)     RBC Morphology Confirmed RBC Indices      Platelet Assessment        Value: Automated Count Confirmed. Platelet morphology is normal.    Target Cells Slight (*)    GLUCOSE BY METER - Abnormal    GLUCOSE BY METER POCT 173 (*)       Emergency Department Course & Assessments:       Interventions:  Medications - No data to display     Independent Interpretation (X-rays, CTs, rhythm strip):  None    Assessments/Consultations/Discussion of Management or Tests:  ED Course as of 02/09/24 1347   Fri Feb 09, 2024   1108 I obtained the history and examined the patient as noted above.    1247 I rechecked the patient. Her grandson was at bedside. I spoke with the patient's daughter over his phone to obtain additional history.       Social Determinants of Health affecting care:   Language barrier    Disposition:  The patient was discharged to home.     Impression & Plan    Medical Decision Making:  This is an 83 year old who comes to the ED because of low blood sugar. Apparently patient 's daughter gave her insulin before going to work. She called her later that day and there was no response. Paramedics came. They found the patient unresponsive and diaphoretic and a glucose in the 20s. They gave her IV glucose and she immediately woke up and responded. Patient is still slightly sweaty on arrival but could answer questions per Sami interpretor. Labs and EKG were obtained without any significant acute  findings. Patient was given fluid, her sugars were checked on a regular basis every hour and were found to be stable. I did visit with the grandson who came and also with his mother, the patient's daughter on the phone. The patient's daughter did not check her sugar before delivering her insulin today. This is not her normal pattern. Patient apparently ate well yesterday and did eat some food today, however not sure how much. She had not received any of her oral hypoglycemia medications today. I have told mom and the daughter and the grandson how important it is always to check her blood glucose before delivering her insulin. She received 18 of Lantus and 4 of Humalog without a glucose check today. If she continues to have problems with hypoglycemia they will have to follow up with their primary and readjust medications at that time.     Diagnosis:    ICD-10-CM    1. Hypoglycemia  E16.2            Discharge Medications:  New Prescriptions    No medications on file      Scribe Disclosure:  IIra, am serving as a scribe at 11:04 AM on 2/9/2024 to document services personally performed by Sam Duncan MD based on my observations and the provider's statements to me.     2/9/2024   Sam Duncan MD Steinman, Randall Ira, MD  02/09/24 [id number]

## 2024-02-09 NOTE — ED TRIAGE NOTES
Daughter called EMS when mother, pt, didn't answer. EMS reports BG @ 27. Daughter did give a.m. insulin dose this morning.